# Patient Record
Sex: MALE | Race: WHITE | NOT HISPANIC OR LATINO | Employment: FULL TIME | URBAN - METROPOLITAN AREA
[De-identification: names, ages, dates, MRNs, and addresses within clinical notes are randomized per-mention and may not be internally consistent; named-entity substitution may affect disease eponyms.]

---

## 2017-03-02 ENCOUNTER — OFFICE VISIT (OUTPATIENT)
Dept: FAMILY MEDICINE | Facility: CLINIC | Age: 62
End: 2017-03-02

## 2017-03-02 VITALS
WEIGHT: 207 LBS | HEART RATE: 91 BPM | SYSTOLIC BLOOD PRESSURE: 136 MMHG | DIASTOLIC BLOOD PRESSURE: 88 MMHG | HEIGHT: 69 IN | TEMPERATURE: 98 F | BODY MASS INDEX: 30.66 KG/M2

## 2017-03-02 DIAGNOSIS — Z02.89 PHYSICAL EXAMINATION OF EMPLOYEE: Primary | ICD-10-CM

## 2017-03-02 PROCEDURE — 99203 OFFICE O/P NEW LOW 30 MIN: CPT | Mod: ,,, | Performed by: FAMILY MEDICINE

## 2017-03-02 RX ORDER — DULAGLUTIDE 0.75 MG/.5ML
INJECTION, SOLUTION SUBCUTANEOUS
COMMUNITY
Start: 2017-02-11 | End: 2017-11-03 | Stop reason: SDUPTHER

## 2017-03-02 RX ORDER — SITAGLIPTIN 100 MG/1
TABLET, FILM COATED ORAL
COMMUNITY
Start: 2017-02-26 | End: 2017-05-17 | Stop reason: SDUPTHER

## 2017-04-17 LAB
ALT SERPL-CCNC: 24 IU/L (ref 0–44)
AST SERPL-CCNC: 21 IU/L (ref 0–40)
BUN SERPL-MCNC: 11 MG/DL (ref 8–27)
BUN/CREAT SERPL: 10 (ref 10–24)
CALCIUM SERPL-MCNC: 10.2 MG/DL (ref 8.6–10.2)
CHLORIDE SERPL-SCNC: 95 MMOL/L (ref 96–106)
CHOLEST SERPL-MCNC: 141 MG/DL (ref 100–199)
CO2 SERPL-SCNC: 24 MMOL/L (ref 18–29)
CREAT SERPL-MCNC: 1.06 MG/DL (ref 0.76–1.27)
GLUCOSE SERPL-MCNC: 118 MG/DL (ref 65–99)
HBA1C MFR BLD: 6.5 % (ref 4.8–5.6)
HDLC SERPL-MCNC: 49 MG/DL
LDLC SERPL CALC-MCNC: 75 MG/DL (ref 0–99)
POTASSIUM SERPL-SCNC: 5.7 MMOL/L (ref 3.5–5.2)
SODIUM SERPL-SCNC: 134 MMOL/L (ref 134–144)
TRIGL SERPL-MCNC: 87 MG/DL (ref 0–149)
VLDLC SERPL CALC-MCNC: 17 MG/DL (ref 5–40)

## 2017-05-17 RX ORDER — SITAGLIPTIN 100 MG/1
TABLET, FILM COATED ORAL
Qty: 30 TABLET | Refills: 0 | Status: SHIPPED | OUTPATIENT
Start: 2017-05-17 | End: 2017-11-10

## 2017-08-11 RX ORDER — MULTIVITAMIN
1 TABLET ORAL DAILY
COMMUNITY
Start: 2013-12-09 | End: 2019-05-24

## 2017-08-18 ENCOUNTER — OFFICE VISIT (OUTPATIENT)
Dept: FAMILY MEDICINE | Facility: CLINIC | Age: 62
End: 2017-08-18
Payer: COMMERCIAL

## 2017-08-18 ENCOUNTER — TELEPHONE (OUTPATIENT)
Dept: FAMILY MEDICINE | Facility: CLINIC | Age: 62
End: 2017-08-18

## 2017-08-18 VITALS
HEART RATE: 84 BPM | BODY MASS INDEX: 30.36 KG/M2 | HEIGHT: 69 IN | DIASTOLIC BLOOD PRESSURE: 86 MMHG | SYSTOLIC BLOOD PRESSURE: 149 MMHG | WEIGHT: 205 LBS

## 2017-08-18 DIAGNOSIS — E87.5 HYPERKALEMIA: ICD-10-CM

## 2017-08-18 DIAGNOSIS — M79.604 RIGHT LEG PAIN: Primary | ICD-10-CM

## 2017-08-18 PROBLEM — E66.9 OBESITY WITH BODY MASS INDEX 30 OR GREATER: Status: ACTIVE | Noted: 2017-08-18

## 2017-08-18 PROBLEM — I10 BENIGN ESSENTIAL HYPERTENSION: Status: ACTIVE | Noted: 2017-08-18

## 2017-08-18 PROBLEM — Z78.9 NON-SMOKER: Status: ACTIVE | Noted: 2017-08-18

## 2017-08-18 PROBLEM — L40.8 OTHER PSORIASIS: Status: ACTIVE | Noted: 2017-08-18

## 2017-08-18 PROBLEM — E11.9 WELL CONTROLLED DIABETES MELLITUS: Status: ACTIVE | Noted: 2017-08-18

## 2017-08-18 PROBLEM — E78.2 MULTIPLE-TYPE HYPERLIPIDEMIA: Status: ACTIVE | Noted: 2017-08-18

## 2017-08-18 PROCEDURE — 99213 OFFICE O/P EST LOW 20 MIN: CPT | Mod: ,,, | Performed by: INTERNAL MEDICINE

## 2017-08-18 PROCEDURE — 3008F BODY MASS INDEX DOCD: CPT | Mod: ,,, | Performed by: INTERNAL MEDICINE

## 2017-08-18 NOTE — PATIENT INSTRUCTIONS
Muscle Strain in the Extremities  A muscle strain is a stretching and tearing of muscle fibers. This causes pain, especially when you move that muscle. There may also be some swelling and bruising.  Home care  · Keep the hurt area raised to reduce pain and swelling. This is especially important during the first 48 hours.  · Apply an ice pack over the injured area for 15 to 20 minutes every 3 to 6 hours. You should do this for the first 24 to 48 hours. You can make an ice pack by filling a plastic bag that seals at the top with ice cubes and then wrapping it with a thin towel. Be careful not to injure your skin with the ice treatments. Ice should never be applied directly to skin. Continue the use of ice packs for relief of pain and swelling as needed. After 48 hours, apply heat (warm shower or warm bath) for 15 to 20 minutes several times a day, or alternate ice and heat.  · You may use over-the-counter pain medicine to control pain, unless another medicine was prescribed. If you have chronic liver or kidney disease or ever had a stomach ulcer or GI bleeding, talk with your healthcare provider before using these medicines.  · For leg strains: If crutches have been recommended, dont put full weight on the hurt leg until you can do so without pain. You can return to sports when you are able to hop and run on the injured leg without pain.  Follow-up care  Follow up with your healthcare provider, or as advised.  When to seek medical advice  Call your healthcare provider right away if any of these occur:  · The toes of the injured leg become swollen, cold, blue, numb, or tingly  · Pain or swelling increases  Date Last Reviewed: 11/19/2015  © 1062-5561 [employer]. [address] [place] [address], [place] [address]. All rights reserved. This information is not intended as a substitute for professional medical care. Always follow your healthcare professional's instructions.

## 2017-08-18 NOTE — PROGRESS NOTES
Subjective:       Patient ID: Mg Torre is a 62 y.o. male.    Chief Complaint: Results (pot high )    Pt c/o leg pain rt side    Also K level is high      Leg Pain    The incident occurred more than 1 week ago. There was no injury mechanism. The pain is present in the right leg. The pain is at a severity of 5/10. The pain is moderate. The pain has been fluctuating since onset. Pertinent negatives include no numbness. The treatment provided no relief.       Past Medical History:   Diagnosis Date    Diabetes mellitus     Diabetes mellitus, type 2     Hyperlipidemia     Hypertension      Social History     Social History    Marital status:      Spouse name: N/A    Number of children: N/A    Years of education: N/A     Occupational History    Not on file.     Social History Main Topics    Smoking status: Never Smoker    Smokeless tobacco: Never Used    Alcohol use Yes    Drug use: No    Sexual activity: Yes     Partners: Female     Other Topics Concern    Not on file     Social History Narrative    No narrative on file     Past Surgical History:   Procedure Laterality Date    APPENDECTOMY      SPINE SURGERY       Family History   Problem Relation Age of Onset    Heart disease Father        Review of Systems   Constitutional: Positive for fatigue. Negative for activity change, appetite change and unexpected weight change.   HENT: Negative for congestion, sneezing and trouble swallowing.    Eyes: Negative for pain and visual disturbance.   Respiratory: Negative for cough, chest tightness and shortness of breath.    Cardiovascular: Negative for chest pain, palpitations and leg swelling.        Borderline hypertension.   Gastrointestinal: Negative for abdominal distention, abdominal pain, blood in stool, constipation and diarrhea.   Endocrine: Negative for cold intolerance, heat intolerance, polydipsia, polyphagia and polyuria.        Diabetes mellitus.   Genitourinary: Negative for  dysuria, hematuria and scrotal swelling.   Musculoskeletal: Positive for back pain. Negative for arthralgias and gait problem.        Patient is a new onset of right thigh pain laterally and medially.   Skin: Positive for rash. Negative for pallor and wound.        Extensive psoriasis on the skin especially in the trunk and back.   Allergic/Immunologic: Negative for environmental allergies, food allergies and immunocompromised state.   Neurological: Negative for dizziness, seizures, speech difficulty, light-headedness and numbness.   Hematological: Negative for adenopathy. Does not bruise/bleed easily.   Psychiatric/Behavioral: Negative for agitation, behavioral problems and confusion. The patient is not nervous/anxious.        Objective:      Physical Exam   Constitutional: He appears well-developed and well-nourished. No distress.   HENT:   Head: Normocephalic and atraumatic.   Eyes: Right eye exhibits no discharge. Left eye exhibits no discharge.   Neck: No tracheal deviation present. No thyromegaly present.   Cardiovascular: Normal rate, regular rhythm and normal heart sounds.    Pulmonary/Chest: Breath sounds normal.   Abdominal: Soft. Bowel sounds are normal. There is no rebound.   Musculoskeletal: He exhibits tenderness.   Patient has pain on the right hip region laterally. On extension of the hip joint he experiences pain laterally. He does experience pain medially in the thigh but on pressure there is no pain on the type medially.   Skin: Rash noted.   Extensive psoriasis noted on the skin in the trunk and back.       Assessment:       1. Right leg pain    2. Hyperkalemia         Plan:           Right leg pain    Hyperkalemia    Patient has dense advised to stop losartan. He should stop anti-inflammatory medications and Goody powders. Increase his fluid intake for the next few weeks. Stop bananas and potassium-rich food Check blood work metabolic panel next week.    I will referred to Dr. Ritter for new onset  of right hip and right lower extreme any pain.    Follow-up in approximately 4-6 weeks.

## 2017-08-18 NOTE — TELEPHONE ENCOUNTER
----- Message from Salazar Campbell MD sent at 8/17/2017  5:21 PM CDT -----   Patient's potassium level is elevated. Stop Losartan now and follow up soon to review    Is he taking lot of fruits and nuts.    Will  Need repeat labs,      ----- Message -----  From: Fallon Stevens  Sent: 8/14/2017  10:11 AM  To: Salazar Campbell MD    LAB [facility] (ELECTROLYTE PANEL) 8/12/17

## 2017-09-03 LAB
BUN SERPL-MCNC: 12 MG/DL (ref 8–27)
BUN/CREAT SERPL: 12 (ref 10–24)
CALCIUM SERPL-MCNC: 9.6 MG/DL (ref 8.6–10.2)
CHLORIDE SERPL-SCNC: 94 MMOL/L (ref 96–106)
CO2 SERPL-SCNC: 23 MMOL/L (ref 18–29)
CREAT SERPL-MCNC: 0.97 MG/DL (ref 0.76–1.27)
GLUCOSE SERPL-MCNC: 137 MG/DL (ref 65–99)
POTASSIUM SERPL-SCNC: 4.7 MMOL/L (ref 3.5–5.2)
SODIUM SERPL-SCNC: 137 MMOL/L (ref 134–144)

## 2017-09-15 ENCOUNTER — OFFICE VISIT (OUTPATIENT)
Dept: FAMILY MEDICINE | Facility: CLINIC | Age: 62
End: 2017-09-15
Payer: COMMERCIAL

## 2017-09-15 VITALS
WEIGHT: 207 LBS | HEIGHT: 69 IN | SYSTOLIC BLOOD PRESSURE: 195 MMHG | DIASTOLIC BLOOD PRESSURE: 105 MMHG | BODY MASS INDEX: 30.66 KG/M2 | HEART RATE: 76 BPM

## 2017-09-15 DIAGNOSIS — Z23 INFLUENZA VACCINE ADMINISTERED: Primary | ICD-10-CM

## 2017-09-15 DIAGNOSIS — E11.9 WELL CONTROLLED DIABETES MELLITUS: ICD-10-CM

## 2017-09-15 DIAGNOSIS — I10 BENIGN ESSENTIAL HYPERTENSION: ICD-10-CM

## 2017-09-15 DIAGNOSIS — E78.2 MULTIPLE-TYPE HYPERLIPIDEMIA: ICD-10-CM

## 2017-09-15 PROCEDURE — 90471 IMMUNIZATION ADMIN: CPT | Mod: ,,, | Performed by: INTERNAL MEDICINE

## 2017-09-15 PROCEDURE — 90686 IIV4 VACC NO PRSV 0.5 ML IM: CPT | Mod: ,,, | Performed by: INTERNAL MEDICINE

## 2017-09-15 PROCEDURE — 3008F BODY MASS INDEX DOCD: CPT | Mod: ,,, | Performed by: INTERNAL MEDICINE

## 2017-09-15 PROCEDURE — 99214 OFFICE O/P EST MOD 30 MIN: CPT | Mod: 25,,, | Performed by: INTERNAL MEDICINE

## 2017-09-15 RX ORDER — AMLODIPINE BESYLATE 5 MG/1
5 TABLET ORAL DAILY
Qty: 90 TABLET | Refills: 3 | Status: SHIPPED | OUTPATIENT
Start: 2017-09-15 | End: 2018-09-09 | Stop reason: SDUPTHER

## 2017-09-15 NOTE — PATIENT INSTRUCTIONS
Taking Your Blood Pressure  Blood pressure is the force of blood against the artery wall as it moves from the heart through the blood vessels. You can take your own blood pressure reading using a digital monitor. Take your readings the same each time, using the same arm. Take readings as often as your healthcare provider instructs.  About blood pressure monitors  Blood pressure monitors are designed for certain ages and cases. You can find monitors for older adults, for pregnant women, and for children. Make sure the one you choose is the right one for your age and situation.  The American Heart Association recommends an automatic cuff monitor that fits on your upper arm (bicep). The cuff should fit your arm size. A cuff thats too large or too small will not give an accurate reading. Measure around your upper arm to find your size.  Monitors that attach to your finger or wrist are not as accurate as monitors for your upper arm.  Ask your healthcare provider for help in choosing a monitor. Bring your monitor to your next provider visit if you need help in using it the correct way.  The steps below are general instructions for using an automatic digital monitor.  Step 1. Relax    · Take your blood pressure at the same time every day, such as in the morning or evening, or at the time your healthcare provider recommends.  · Wait at least a half-hour after smoking, eating, or exercising. Don't drink coffee, tea, soda, or other caffeinated beverages before checking your blood pressure.  · Sit comfortably at a table with both feet on the floor. Do not cross your legs or feet. Place the monitor near you.  · Rest for a few minutes before you begin.  Step 2. Wrap the cuff    · Place your arm on the table, palm up. Your arm should be at the level of your heart. Wrap the cuff around your upper arm, just above your elbow. Its best done on bare skin, not over clothing. Most cuffs will indicate where the brachial artery (the  blood vessel in the middle of the arm at the inner side of the elbow) should line up with the cuff. Look in your monitor's instruction booklet for an illustration. You can also bring your cuff to your healthcare provider and have them show you how to correctly place the cuff.  Step 3. Inflate the cuff    · Push the button that starts the pump.  · The cuff will tighten, then loosen.  · The numbers will change. When they stop changing, your blood pressure reading will appear.  · Take 2 or 3 readings one minute apart.  Step 4. Write down the results of each reading    · Write down your blood pressure numbers for each reading. Note the date and time. Keep your results in one place, such as a notebook. Even if your monitor has a built-in memory, keep a hard copy of the readings.  · Remove the cuff from your arm. Turn off the machine.  · Bring your blood pressure records with your healthcare providers at each visit.  · If you start a new blood pressure medicine, note the day you started the new medicine. Also note the day if you change the dose of your medicine. This information goes on your blood pressure recording sheet. This will help your healthcare provider monitor how well the medicine changes are working.  · Ask your healthcare provider what numbers should prompt you to call him or her. Also ask what numbers should prompt you to get help right away.  Date Last Reviewed: 11/1/2016  © 5385-2666 [employer]. [address] [place] [address], [place] [address]. All rights reserved. This information is not intended as a substitute for professional medical care. Always follow your healthcare professional's instructions.

## 2017-09-15 NOTE — PROGRESS NOTES
Subjective:       Patient ID: Mg Torre is a 62 y.o. male.    Chief Complaint: Diabetes (lab review ); Hyperlipidemia; and Hypertension    Pt c/o leg pain rt side-patient has pain on the right foot.    Also K level is high      Leg Pain    The incident occurred more than 1 week ago. There was no injury mechanism. The pain is present in the right leg. The pain is at a severity of 5/10. The pain is moderate. The pain has been fluctuating since onset. Pertinent negatives include no numbness. The treatment provided no relief.   Diabetes   He presents for his follow-up diabetic visit. He has type 2 diabetes mellitus. No MedicAlert identification noted. His disease course has been stable. Pertinent negatives for hypoglycemia include no confusion, dizziness, nervousness/anxiousness, pallor, seizures or speech difficulty. Associated symptoms include fatigue. Pertinent negatives for diabetes include no chest pain, no foot ulcerations, no polydipsia, no polyphagia, no polyuria and no weight loss. Pertinent negatives for diabetic complications include no CVA. Current diabetic treatment includes oral agent (triple therapy) (Injection Trulicity). He is compliant with treatment most of the time. His weight is stable. He is following a diabetic diet. There is no change in his home blood glucose trend. His breakfast blood glucose range is generally 130-140 mg/dl. An ACE inhibitor/angiotensin II receptor blocker is contraindicated (Hyperkalemia). He does not see a podiatrist.Eye exam is current.   Hyperlipidemia   This is a chronic problem. Associated symptoms include leg pain. Pertinent negatives include no chest pain or shortness of breath. Current antihyperlipidemic treatment includes statins. Risk factors for coronary artery disease include diabetes mellitus, dyslipidemia, male sex, hypertension, a sedentary lifestyle and obesity.   Hypertension   This is a chronic problem. The current episode started more than 1 year  ago. The problem is uncontrolled. Pertinent negatives include no anxiety, chest pain, palpitations, peripheral edema or shortness of breath. There are no associated agents to hypertension. Risk factors for coronary artery disease include diabetes mellitus, dyslipidemia, male gender, obesity and sedentary lifestyle. Past treatments include nothing (Losartan stopped because of hyperkalemia). Compliance problems include medication side effects.  There is no history of angina or CVA.       Past Medical History:   Diagnosis Date    Diabetes mellitus     Diabetes mellitus, type 2     Hyperlipidemia     Hypertension      Social History     Social History    Marital status:      Spouse name: N/A    Number of children: N/A    Years of education: N/A     Occupational History    Not on file.     Social History Main Topics    Smoking status: Never Smoker    Smokeless tobacco: Never Used    Alcohol use Yes    Drug use: No    Sexual activity: Yes     Partners: Female     Other Topics Concern    Not on file     Social History Narrative    No narrative on file     Past Surgical History:   Procedure Laterality Date    APPENDECTOMY      SPINE SURGERY       Family History   Problem Relation Age of Onset    Heart disease Father        Review of Systems   Constitutional: Positive for fatigue. Negative for activity change, appetite change, unexpected weight change and weight loss.   HENT: Negative for congestion, sneezing and trouble swallowing.    Eyes: Negative for pain and visual disturbance.   Respiratory: Negative for cough, chest tightness and shortness of breath.    Cardiovascular: Negative for chest pain, palpitations and leg swelling.        Borderline hypertension.   Gastrointestinal: Negative for abdominal distention, abdominal pain, blood in stool, constipation and diarrhea.   Endocrine: Negative for cold intolerance, heat intolerance, polydipsia, polyphagia and polyuria.        Diabetes mellitus.  "  Genitourinary: Negative for dysuria, hematuria and scrotal swelling.   Musculoskeletal: Positive for back pain. Negative for arthralgias and gait problem.        Patient is a new onset of right thigh pain laterally and medially.   Skin: Negative for pallor, rash and wound.        Extensive psoriasis on the skin especially in the trunk and back.   Allergic/Immunologic: Negative for environmental allergies, food allergies and immunocompromised state.   Neurological: Negative for dizziness, seizures, speech difficulty, light-headedness and numbness.   Hematological: Negative for adenopathy. Does not bruise/bleed easily.   Psychiatric/Behavioral: Negative for agitation, behavioral problems and confusion. The patient is not nervous/anxious.        Objective:       Vitals:    09/15/17 0949   BP: (!) 195/105   Pulse: 76   Weight: 93.9 kg (207 lb)   Height: 5' 9" (1.753 m)     Physical Exam   Constitutional: He appears well-developed and well-nourished. No distress.   HENT:   Head: Normocephalic and atraumatic.   Eyes: Right eye exhibits no discharge. Left eye exhibits no discharge.   Neck: No tracheal deviation present. No thyromegaly present.   Cardiovascular: Normal rate, regular rhythm and normal heart sounds.    Pulmonary/Chest: Breath sounds normal.   Abdominal: Soft. Bowel sounds are normal. There is no rebound.   Musculoskeletal: He exhibits tenderness.   Discomfort on the right thigh.   Lymphadenopathy:     He has no cervical adenopathy.   Skin: Skin is dry. Rash (psoriasis) noted.   Extensive psoriasis noted on the skin in the trunk and back.   Psychiatric: He has a normal mood and affect.       Assessment:       1. Influenza vaccine administered    2. Benign essential hypertension    3. Multiple-type hyperlipidemia    4. Well controlled diabetes mellitus         Plan:           Influenza vaccine administered  -     Influenza - Quadrivalent (3 years & older) (PF)    Benign essential hypertension  -     " amlodipine (NORVASC) 5 MG tablet; Take 1 tablet (5 mg total) by mouth once daily.  Dispense: 90 tablet; Refill: 3    Multiple-type hyperlipidemia    Well controlled diabetes mellitus    Patient's repeat potassium is normal. He is of losartan because of prior hyperkalemia. I will start him on amlodipine 5 mg per day. He will continue to monitor his blood pressures.    Patient will continue with his diabetes management. I'll follow him shortly to review blood pressures again.    Advised Mr. Torre to monitor Blood sugars at home and record them.  Exercise, watch diet and loose weight.  keep a close eye on feet and keep them clean. Annual eye examination. Annual influenza vaccine.  Monitor HgbA1c every 3 to 6 months. Monitor urine microalbumin every year.keep LDL less than 100. Monitor blood pressure and target blood pressure 120/70.          Patient has been advised to watch diet and exercise. Avoid fried and fatty food. Compliance to medications and follow up urged.

## 2017-09-20 ENCOUNTER — OFFICE VISIT (OUTPATIENT)
Dept: FAMILY MEDICINE | Facility: CLINIC | Age: 62
End: 2017-09-20
Payer: COMMERCIAL

## 2017-09-20 VITALS
SYSTOLIC BLOOD PRESSURE: 139 MMHG | BODY MASS INDEX: 30.36 KG/M2 | HEIGHT: 69 IN | RESPIRATION RATE: 16 BRPM | WEIGHT: 205 LBS | HEART RATE: 89 BPM | DIASTOLIC BLOOD PRESSURE: 80 MMHG

## 2017-09-20 DIAGNOSIS — M25.551 PAIN OF RIGHT HIP JOINT: ICD-10-CM

## 2017-09-20 DIAGNOSIS — I10 BENIGN ESSENTIAL HYPERTENSION: ICD-10-CM

## 2017-09-20 DIAGNOSIS — E11.9 WELL CONTROLLED DIABETES MELLITUS: ICD-10-CM

## 2017-09-20 DIAGNOSIS — E78.2 MULTIPLE-TYPE HYPERLIPIDEMIA: ICD-10-CM

## 2017-09-20 DIAGNOSIS — Z01.818 PRE-OP EXAMINATION: Primary | ICD-10-CM

## 2017-09-20 DIAGNOSIS — I51.7 CARDIOMEGALY: ICD-10-CM

## 2017-09-20 PROCEDURE — 99214 OFFICE O/P EST MOD 30 MIN: CPT | Mod: ,,, | Performed by: INTERNAL MEDICINE

## 2017-09-20 PROCEDURE — 3008F BODY MASS INDEX DOCD: CPT | Mod: ,,, | Performed by: INTERNAL MEDICINE

## 2017-09-20 NOTE — PATIENT INSTRUCTIONS
Using a Blood Sugar Log    You have diabetes. This means your body has trouble regulating a sugar called glucose. To help manage your diabetes, youll need to check your blood sugar level as directed by your healthcare provider. Keeping a log of your blood sugar levels will help you track your blood sugar readings. Its a simple and easy way to see how well you are controlling your diabetes.  Checking your blood sugar level  You can check your blood sugar level with a blood glucose meter. Youll first prick the side of your finger with a tiny lancet to draw a tiny drop of blood onto the test strip. Some glucose meters let you use another place on your body to test. But these other places should not be used in some cases as they may be inaccurate. Follow the instructions for your glucose meter. And talk with your healthcare provider before doing the test on other places.  The strip goes into the meter first, then a drop of blood is placed on the tip of the strip. The meter then shows a reading that tells you the level of your blood sugar. Your readings should be in your target range as often as possible. This means not too high or too low. Staying in this range helps lower your risk for complications. Your healthcare provider will help you figure out the target range that is best for you.  Tracking your readings  Every time you check your blood sugar, use your log to keep track of your readings. Your meter will also probably have a memory feature that your healthcare provider can check at your next visit. You may be advised by your healthcare provider to check your blood sugar in the morning, at bedtime, and before and after meals. Be sure to write down all of your numbers. Also use your log to record things that might have affected your blood sugar. Some examples include being sick, certain medicines, being physically active, feeling stressed, or skipping meals.   Lessons learned from your readings  Tracking your  blood sugar readings helps you see patterns. These patterns tell you how your actions affect your blood sugar. For instance, you may have higher numbers after eating certain foods or lower numbers after exercise. They just help you understand how to stay in your target range more often, so that your diabetes remains in good control.  Sharing your log with your healthcare team  Bring your blood sugar log and glucose meter with you to all of your healthcare appointments. This can help your healthcare team make changes to your treatment plan, if needed. This may involve making changes in what you eat, what medicines you take, or how much you exercise.  To learn more  The resources below can help you learn more:  · American Diabetes Association 735-213-7755 www.diabetes.org  · [employer] 772-052-4848 www.lighthouse.org  · [employer] [place] 837-980-7458 www.nei.nih.gov  · [employer] 176-015-5454 www.hormone.org  Date Last Reviewed: 5/1/2016  © 2754-0573 [employer]. [address] [place] [address], [place] [address]. All rights reserved. This information is not intended as a substitute for professional medical care. Always follow your healthcare professional's instructions.

## 2017-09-20 NOTE — PROGRESS NOTES
Subjective:       Patient ID: Mg Torre is a 62 y.o. male.    Chief Complaint: Pre-op Exam (surg clear for hip )    The procedure scheduled is a Rt Hip replacement on Oct 3rd or oct 10th 2017. The surgeon for the procedure will be Dr Ritter. The chief complaint is Rt hip pain. Recent symptoms do not include fever, chills, fatigue, chest pain, cough, dyspnea, dysuria, urinary frequency, nausea, vomiting, diarrhea, abdominal pain, easy bruising, lower extremity swelling or poor exercise tolerance. The patient's last health maintenance visit was week(s) ago. Pertinent medical history includes prior anesthesia, previous anesthesia reaction (Left shoulder surgery- woke up late after anesthesia) and diabetes, while pertinent medical history does not include pulmonary disease, renal disease, gastrointestinal disease (Occ heart burn), sleep apnea, thromboembolic problems, clotting disorder, bleeding disorder or corticosteroid use in the last six months. Pertinent family history includes myocardial infarction (Father HAd MI - not surgery or anesthesia related.), while pertinent family history does not include anesthesia reaction, stroke, aneurysm, clotting disorder or bleeding disorder. Pertinent social history does not include nonsteroidal anti-inflammatory drug use, tobacco use, alcohol use, illicit drug use, transfusion refusal, wearing dentures or partial plates or concerns regarding care after surgery. After surgery the patient plans to recover at home with family.    Hypertension   This is a chronic problem. The current episode started more than 1 year ago. The problem is controlled. Associated symptoms include anxiety. Pertinent negatives include no blurred vision, chest pain, palpitations, peripheral edema or shortness of breath. Past treatments include calcium channel blockers, diuretics and angiotensin blockers. The current treatment provides moderate improvement. Compliance problems include psychosocial  issues and exercise.  Hypertensive end-organ damage includes heart failure. There is no history of kidney disease, CVA, PVD or renovascular disease.   Hyperlipidemia   This is a chronic problem. The current episode started more than 1 year ago. Pertinent negatives include no chest pain or shortness of breath. Current antihyperlipidemic treatment includes statins.   Diabetes   He presents for his follow-up diabetic visit. He has type 2 diabetes mellitus. No MedicAlert identification noted. Pertinent negatives for hypoglycemia include no confusion, dizziness, nervousness/anxiousness, pallor, seizures or speech difficulty. Associated symptoms include fatigue. Pertinent negatives for diabetes include no blurred vision, no chest pain, no foot ulcerations, no polydipsia, no polyphagia, no polyuria and no visual change. Pertinent negatives for diabetic complications include no CVA or PVD. Risk factors for coronary artery disease include obesity, male sex, hypertension, diabetes mellitus, dyslipidemia and sedentary lifestyle. Current diabetic treatment includes oral agent (triple therapy). He is compliant with treatment most of the time. His weight is stable. An ACE inhibitor/angiotensin II receptor blocker is being taken. He does not see a podiatrist.Eye exam is current.       Past Medical History:   Diagnosis Date    Diabetes mellitus     Diabetes mellitus, type 2     Hyperlipidemia     Hypertension      Social History     Social History    Marital status:      Spouse name: N/A    Number of children: N/A    Years of education: N/A     Occupational History    Not on file.     Social History Main Topics    Smoking status: Never Smoker    Smokeless tobacco: Never Used    Alcohol use Yes    Drug use: No    Sexual activity: Yes     Partners: Female     Other Topics Concern    Not on file     Social History Narrative    No narrative on file     Past Surgical History:   Procedure Laterality Date     "APPENDECTOMY      SPINE SURGERY       Family History   Problem Relation Age of Onset    Heart disease Father        Review of Systems   Constitutional: Positive for fatigue. Negative for activity change, appetite change and unexpected weight change.   HENT: Negative for congestion, sneezing and trouble swallowing.    Eyes: Negative for blurred vision, pain and visual disturbance.   Respiratory: Negative for cough, chest tightness and shortness of breath.    Cardiovascular: Negative for chest pain, palpitations and leg swelling.        Borderline hypertension.   Gastrointestinal: Negative for abdominal distention, abdominal pain, blood in stool, constipation and diarrhea.   Endocrine: Negative for cold intolerance, heat intolerance, polydipsia, polyphagia and polyuria.        Diabetes mellitus.   Genitourinary: Negative for dysuria, hematuria and scrotal swelling.   Musculoskeletal: Positive for back pain. Negative for arthralgias and gait problem.        Patient is a new onset of right thigh pain laterally and medially.   Skin: Negative for pallor, rash and wound.        Extensive psoriasis on the skin especially in the trunk and back.   Allergic/Immunologic: Negative for environmental allergies, food allergies and immunocompromised state.   Neurological: Negative for dizziness, seizures, speech difficulty, light-headedness and numbness.   Hematological: Negative for adenopathy. Does not bruise/bleed easily.   Psychiatric/Behavioral: Negative for agitation, behavioral problems and confusion. The patient is not nervous/anxious.        Objective:       Vitals:    09/20/17 0950 09/20/17 1039   BP: (!) 153/84 139/80   Pulse: 89    Resp: 16    Weight: 93 kg (205 lb)    Height: 5' 9" (1.753 m)      Physical Exam   Constitutional: He appears well-developed and well-nourished. No distress.   HENT:   Head: Normocephalic and atraumatic.   Eyes: Right eye exhibits no discharge. Left eye exhibits no discharge.   Neck: No " tracheal deviation present. No thyromegaly present.   Cardiovascular: Normal rate, regular rhythm and normal heart sounds.    Pulmonary/Chest: Breath sounds normal.   Abdominal: Soft. Bowel sounds are normal. There is no rebound.   Musculoskeletal: He exhibits tenderness.   Discomfort on the right thigh.   Lymphadenopathy:     He has no cervical adenopathy.   Skin: Skin is dry. Rash (psoriasis) noted.   Extensive psoriasis noted on the skin in the trunk and back.   Psychiatric: He has a normal mood and affect.       Assessment:       1. Pre-op examination    2. Benign essential hypertension    3. Multiple-type hyperlipidemia    4. Well controlled diabetes mellitus    5. Pain of right hip joint       CLINICAL HISTORY:  62 years (1955) Male I51.7    TECHNIQUE:  PA and lateral radiograph of the chest.    COMPARISON:  None available.    FINDINGS:  The lungs appear clear. There is no pneumothorax. Costophrenic angles are seen  without effusion. The heart is normal in size .  The mediastinum is within  normal limits. Osseous structures show degenerative changes in the spine. The  upper abdomen is unremarkable.    IMPRESSION:  No acute cardiac or pulmonary process.  Plan:           Pre-op examination    Benign essential hypertension    Multiple-type hyperlipidemia    Well controlled diabetes mellitus    Pain of right hip joint    Patient is here for preop clearance for proposed right hip replacement. He has chronic right-sided hip pain. This will be done under general anesthesia. Patient's chest x-rays unremarkable. Pending normal labs and unremarkable EKG he should be medically clear. He'll continue with has blood pressure medications on the day of surgery. He will hold his diabetic medications on the day of surgery.    He will need perioperative or postoperative insulin sliding scale to monitor his blood sugars.    Once he starts feeding effectively, without any nausea or vomiting- PO mouth medications can be  resumed.    Postoperative DVT prophylaxis may be considered as appropriate. Patient is nonsmoker and does not have any chronic pulmonary issues.    He'll keep his regular follow-up as scheduled with me for his chronic medical issues.  Current Outpatient Prescriptions on File Prior to Visit   Medication Sig Dispense Refill    amlodipine (NORVASC) 5 MG tablet Take 1 tablet (5 mg total) by mouth once daily. 90 tablet 3    aspirin 81 MG Chew Take 81 mg by mouth once daily.      glyBURIDE (DIABETA) 2.5 MG tablet       hydrochlorothiazide (HYDRODIURIL) 12.5 MG Tab       JANUVIA 100 mg Tab TAKE ONE TABLET BY MOUTH ONCE DAILY 30 tablet 0    metformin (GLUCOPHAGE) 500 MG tablet Take 500 mg by mouth 2 (two) times daily with meals.        multivitamin (THERAGRAN) per tablet Take 1 tablet by mouth Daily.      simvastatin (ZOCOR) 40 MG tablet       TRULICITY 0.75 mg/0.5 mL PnIj        No current facility-administered medications on file prior to visit.      Pt EKG , Chest X ray and new labs have been reviewed and he is medically optimal for proposed surgery. He will need post operative insulin sliding scale till he starts feeding adequately.

## 2017-09-23 LAB
ALBUMIN SERPL-MCNC: 4.5 G/DL (ref 3.6–4.8)
ALBUMIN/GLOB SERPL: 1.6 {RATIO} (ref 1.2–2.2)
ALP SERPL-CCNC: 74 IU/L (ref 39–117)
ALT SERPL-CCNC: 24 IU/L (ref 0–44)
APPEARANCE UR: CLEAR
AST SERPL-CCNC: 20 IU/L (ref 0–40)
BASOPHILS # BLD AUTO: 0 X10E3/UL (ref 0–0.2)
BASOPHILS NFR BLD AUTO: 0 %
BILIRUB SERPL-MCNC: 0.7 MG/DL (ref 0–1.2)
BILIRUB UR QL STRIP: NEGATIVE
BUN SERPL-MCNC: 16 MG/DL (ref 8–27)
BUN/CREAT SERPL: 15 (ref 10–24)
CALCIUM SERPL-MCNC: 10.1 MG/DL (ref 8.6–10.2)
CHLORIDE SERPL-SCNC: 95 MMOL/L (ref 96–106)
CO2 SERPL-SCNC: 28 MMOL/L (ref 18–29)
COLOR UR: YELLOW
CREAT SERPL-MCNC: 1.1 MG/DL (ref 0.76–1.27)
EOSINOPHIL # BLD AUTO: 0 X10E3/UL (ref 0–0.4)
EOSINOPHIL NFR BLD AUTO: 0 %
ERYTHROCYTE [DISTWIDTH] IN BLOOD BY AUTOMATED COUNT: 13.6 % (ref 12.3–15.4)
GLOBULIN SER CALC-MCNC: 2.9 G/DL (ref 1.5–4.5)
GLUCOSE SERPL-MCNC: 160 MG/DL (ref 65–99)
GLUCOSE UR QL: NEGATIVE
HCT VFR BLD AUTO: 44.4 % (ref 37.5–51)
HGB BLD-MCNC: 14.8 G/DL (ref 12.6–17.7)
HGB UR QL STRIP: NEGATIVE
IMM GRANULOCYTES # BLD: 0 X10E3/UL (ref 0–0.1)
IMM GRANULOCYTES NFR BLD: 0 %
KETONES UR QL STRIP: NEGATIVE
LEUKOCYTE ESTERASE UR QL STRIP: NEGATIVE
LYMPHOCYTES # BLD AUTO: 0.8 X10E3/UL (ref 0.7–3.1)
LYMPHOCYTES NFR BLD AUTO: 12 %
MCH RBC QN AUTO: 27.4 PG (ref 26.6–33)
MCHC RBC AUTO-ENTMCNC: 33.3 G/DL (ref 31.5–35.7)
MCV RBC AUTO: 82 FL (ref 79–97)
MICRO URNS: NORMAL
MONOCYTES # BLD AUTO: 0.7 X10E3/UL (ref 0.1–0.9)
MONOCYTES NFR BLD AUTO: 10 %
NEUTROPHILS # BLD AUTO: 5.5 X10E3/UL (ref 1.4–7)
NEUTROPHILS NFR BLD AUTO: 78 %
NITRITE UR QL STRIP: NEGATIVE
PH UR STRIP: 7 [PH] (ref 5–7.5)
PLATELET # BLD AUTO: 270 X10E3/UL (ref 150–379)
POTASSIUM SERPL-SCNC: 4.9 MMOL/L (ref 3.5–5.2)
PROT SERPL-MCNC: 7.4 G/DL (ref 6–8.5)
PROT UR QL STRIP: NEGATIVE
RBC # BLD AUTO: 5.41 X10E6/UL (ref 4.14–5.8)
SODIUM SERPL-SCNC: 138 MMOL/L (ref 134–144)
SP GR UR: 1.02 (ref 1–1.03)
UROBILINOGEN UR STRIP-MCNC: 1 MG/DL (ref 0.2–1)
WBC # BLD AUTO: 7.1 X10E3/UL (ref 3.4–10.8)

## 2017-10-06 RX ORDER — SIMVASTATIN 40 MG/1
TABLET, FILM COATED ORAL
Qty: 90 TABLET | Refills: 3 | Status: SHIPPED | OUTPATIENT
Start: 2017-10-06 | End: 2018-10-06 | Stop reason: SDUPTHER

## 2017-10-08 PROBLEM — Z92.89 HISTORY OF EKG: Status: ACTIVE | Noted: 2017-09-20

## 2017-11-02 ENCOUNTER — TELEPHONE (OUTPATIENT)
Dept: FAMILY MEDICINE | Facility: CLINIC | Age: 62
End: 2017-11-02

## 2017-11-02 DIAGNOSIS — E11.9 WELL CONTROLLED DIABETES MELLITUS: ICD-10-CM

## 2017-11-02 DIAGNOSIS — Z11.59 NEED FOR HEPATITIS C SCREENING TEST: ICD-10-CM

## 2017-11-02 DIAGNOSIS — I10 BENIGN ESSENTIAL HYPERTENSION: Primary | ICD-10-CM

## 2017-11-04 RX ORDER — DULAGLUTIDE 0.75 MG/.5ML
INJECTION, SOLUTION SUBCUTANEOUS
Qty: 12 SYRINGE | Refills: 3 | Status: SHIPPED | OUTPATIENT
Start: 2017-11-04 | End: 2017-11-06 | Stop reason: SDUPTHER

## 2017-11-05 LAB
ALBUMIN/CREAT UR: <3.6 MG/G CREAT (ref 0–30)
ALT SERPL-CCNC: 20 IU/L (ref 0–44)
BUN SERPL-MCNC: 15 MG/DL (ref 8–27)
BUN/CREAT SERPL: 15 (ref 10–24)
CALCIUM SERPL-MCNC: 10.1 MG/DL (ref 8.6–10.2)
CHLORIDE SERPL-SCNC: 97 MMOL/L (ref 96–106)
CO2 SERPL-SCNC: 23 MMOL/L (ref 18–29)
CREAT SERPL-MCNC: 1.01 MG/DL (ref 0.76–1.27)
CREAT UR-MCNC: 83.1 MG/DL
GFR SERPLBLD CREATININE-BSD FMLA CKD-EPI: 79 ML/MIN/1.73
GFR SERPLBLD CREATININE-BSD FMLA CKD-EPI: 92 ML/MIN/1.73
GLUCOSE SERPL-MCNC: 157 MG/DL (ref 65–99)
HBA1C MFR BLD: 6.1 % (ref 4.8–5.6)
HCV AB S/CO SERPL IA: <0.1 S/CO RATIO (ref 0–0.9)
MICROALBUMIN UR-MCNC: <3 UG/ML
POTASSIUM SERPL-SCNC: 5.3 MMOL/L (ref 3.5–5.2)
SODIUM SERPL-SCNC: 140 MMOL/L (ref 134–144)

## 2017-11-10 ENCOUNTER — OFFICE VISIT (OUTPATIENT)
Dept: FAMILY MEDICINE | Facility: CLINIC | Age: 62
End: 2017-11-10
Payer: COMMERCIAL

## 2017-11-10 VITALS
HEART RATE: 88 BPM | BODY MASS INDEX: 29.92 KG/M2 | SYSTOLIC BLOOD PRESSURE: 134 MMHG | DIASTOLIC BLOOD PRESSURE: 86 MMHG | HEIGHT: 69 IN | WEIGHT: 202 LBS

## 2017-11-10 DIAGNOSIS — E11.9 WELL CONTROLLED DIABETES MELLITUS: Primary | ICD-10-CM

## 2017-11-10 DIAGNOSIS — I10 BENIGN ESSENTIAL HYPERTENSION: Primary | ICD-10-CM

## 2017-11-10 PROCEDURE — 99213 OFFICE O/P EST LOW 20 MIN: CPT | Mod: ,,, | Performed by: INTERNAL MEDICINE

## 2017-11-10 RX ORDER — OXYCODONE AND ACETAMINOPHEN 5; 325 MG/1; MG/1
1 TABLET ORAL EVERY 4 HOURS PRN
COMMUNITY
End: 2018-01-12

## 2017-11-10 RX ORDER — ASPIRIN 325 MG
325 TABLET ORAL 2 TIMES DAILY
COMMUNITY
End: 2019-03-15

## 2017-11-10 NOTE — PATIENT INSTRUCTIONS
Taking Your Blood Pressure  Blood pressure is the force of blood against the artery wall as it moves from the heart through the blood vessels. You can take your own blood pressure reading using a digital monitor. Take your readings the same each time, using the same arm. Take readings as often as your healthcare provider instructs.  About blood pressure monitors  Blood pressure monitors are designed for certain ages and cases. You can find monitors for older adults, for pregnant women, and for children. Make sure the one you choose is the right one for your age and situation.  The American Heart Association recommends an automatic cuff monitor that fits on your upper arm (bicep). The cuff should fit your arm size. A cuff thats too large or too small will not give an accurate reading. Measure around your upper arm to find your size.  Monitors that attach to your finger or wrist are not as accurate as monitors for your upper arm.  Ask your healthcare provider for help in choosing a monitor. Bring your monitor to your next provider visit if you need help in using it the correct way.  The steps below are general instructions for using an automatic digital monitor.  Step 1. Relax    · Take your blood pressure at the same time every day, such as in the morning or evening, or at the time your healthcare provider recommends.  · Wait at least a half-hour after smoking, eating, or exercising. Don't drink coffee, tea, soda, or other caffeinated beverages before checking your blood pressure.  · Sit comfortably at a table with both feet on the floor. Do not cross your legs or feet. Place the monitor near you.  · Rest for a few minutes before you begin.  Step 2. Wrap the cuff    · Place your arm on the table, palm up. Your arm should be at the level of your heart. Wrap the cuff around your upper arm, just above your elbow. Its best done on bare skin, not over clothing. Most cuffs will indicate where the brachial artery (the  blood vessel in the middle of the arm at the inner side of the elbow) should line up with the cuff. Look in your monitor's instruction booklet for an illustration. You can also bring your cuff to your healthcare provider and have them show you how to correctly place the cuff.  Step 3. Inflate the cuff    · Push the button that starts the pump.  · The cuff will tighten, then loosen.  · The numbers will change. When they stop changing, your blood pressure reading will appear.  · Take 2 or 3 readings one minute apart.  Step 4. Write down the results of each reading    · Write down your blood pressure numbers for each reading. Note the date and time. Keep your results in one place, such as a notebook. Even if your monitor has a built-in memory, keep a hard copy of the readings.  · Remove the cuff from your arm. Turn off the machine.  · Bring your blood pressure records with your healthcare providers at each visit.  · If you start a new blood pressure medicine, note the day you started the new medicine. Also note the day if you change the dose of your medicine. This information goes on your blood pressure recording sheet. This will help your healthcare provider monitor how well the medicine changes are working.  · Ask your healthcare provider what numbers should prompt you to call him or her. Also ask what numbers should prompt you to get help right away.  Date Last Reviewed: 11/1/2016  © 2538-2103 [employer]. [address] [place] [address], [place] [address]. All rights reserved. This information is not intended as a substitute for professional medical care. Always follow your healthcare professional's instructions.

## 2017-11-10 NOTE — PROGRESS NOTES
Subjective:       Patient ID: Mg Torre is a 62 y.o. male.    Chief Complaint: Hypertension (lab review )    Hypertension   This is a chronic problem. The current episode started more than 1 year ago. The problem has been waxing and waning since onset. Pertinent negatives include no chest pain, palpitations, peripheral edema or shortness of breath. Risk factors for coronary artery disease include male gender, sedentary lifestyle, dyslipidemia and diabetes mellitus. Past treatments include diuretics and calcium channel blockers.       Past Medical History:   Diagnosis Date    Diabetes mellitus     Diabetes mellitus, type 2     Hyperlipidemia     Hypertension      Social History     Social History    Marital status:      Spouse name: N/A    Number of children: N/A    Years of education: N/A     Occupational History    Not on file.     Social History Main Topics    Smoking status: Never Smoker    Smokeless tobacco: Never Used    Alcohol use Yes    Drug use: No    Sexual activity: Yes     Partners: Female     Other Topics Concern    Not on file     Social History Narrative    No narrative on file     Past Surgical History:   Procedure Laterality Date    APPENDECTOMY      SPINE SURGERY       Family History   Problem Relation Age of Onset    Heart disease Father        Review of Systems   Constitutional: Positive for fatigue. Negative for activity change, appetite change and unexpected weight change.   HENT: Negative for congestion, sneezing and trouble swallowing.    Eyes: Negative for pain and visual disturbance.   Respiratory: Negative for cough, chest tightness and shortness of breath.    Cardiovascular: Negative for chest pain, palpitations and leg swelling.        Borderline hypertension.   Gastrointestinal: Negative for abdominal distention and abdominal pain.   Endocrine: Negative for cold intolerance and heat intolerance.        Diabetes mellitus.   Genitourinary: Negative for  "dysuria.   Musculoskeletal: Negative for arthralgias, back pain and gait problem.        Patient is status post right-sided hip arthroplasty and is recovering gradually with acceptable range of motion and quality of life.   Skin: Negative for pallor, rash and wound.        Extensive psoriasis on the skin especially in the trunk and back.   Neurological: Negative for dizziness.   Psychiatric/Behavioral: Negative for agitation, behavioral problems and confusion. The patient is not nervous/anxious.        Objective:       Vitals:    11/10/17 0933 11/10/17 1021   BP: (!) 156/84 134/86   BP Location:  Right arm   Patient Position:  Sitting   BP Method:  Large (Automatic)   Pulse: 88    Weight: 91.6 kg (202 lb)    Height: 5' 9" (1.753 m)      Physical Exam   Constitutional: He appears well-developed and well-nourished. No distress.   HENT:   Head: Normocephalic and atraumatic.   Eyes: Right eye exhibits no discharge. Left eye exhibits no discharge.   Neck: No tracheal deviation present. No thyromegaly present.   Cardiovascular: Normal rate, regular rhythm and normal heart sounds.    Pulmonary/Chest: Breath sounds normal.   Abdominal: Soft. Bowel sounds are normal. There is no rebound.   Musculoskeletal:   Discomfort on the right thigh.   Lymphadenopathy:     He has no cervical adenopathy.   Skin: Skin is dry. Rash (psoriasis) noted.   Extensive psoriasis noted on the skin in the trunk and back.   Psychiatric: He has a normal mood and affect.     Basic metabolic panel   Order: [id number]   Status:  Final result   Visible to patient:  No (Not Released) Next appt:  01/12/2018 at 09:00 AM in Family Medicine (Salazar Campbell MD) Dx:  Benign essential hypertension   Notes Recorded by Salazar Campbell MD on 11/5/2017 at 7:39 PM CST  K is 5.3  Pt not on K supplements. To be advised to stop Banana OJ repeat     Ref Range & Units 6d ago 1mo ago    Glucose 65 - 99 mg/dL 157   160      BUN, Bld 8 - 27 mg/dL 15  16     Creatinine 0.76 - " 1.27 mg/dL 1.01  1.10     eGFR if non African American >59 mL/min/1.73 79  72     eGFR if African American >59 mL/min/1.73 92  83     BUN/Creatinine Ratio 10 - 24 15  15     Sodium 134 - 144 mmol/L 140  138     Potassium 3.5 - 5.2 mmol/L 5.3   4.9               Notes Recorded by Salazar Campbell MD on 11/5/2017 at 7:39 PM CST  K is 5.3  Pt not on K supplements. To be advised to stop Banana OJ repeat    Ref Range & Units 6d ago   Hep C Virus Ab Signal/Cutoff 0.0 - 0.9 s/co ratio <0.1    Comments:                                   Negative:     < 0.8                                Indeterminate: 0.8 - 0.9                                     Positive:     > 0.9            Notes Recorded by Salazar Campbell MD on 11/5/2017 at 7:39 PM CST  K is 5.3  Pt not on K supplements. To be advised to stop Banana OJ repeat     Ref Range & Units 6d ago 6mo ago    Hemoglobin A1C 4.8 - 5.6 % 6.1   6.5CM     Comments:          Pre-diabetes: 5.7 - 6.4              Assessment:       1. Benign essential hypertension         Plan:           Benign essential hypertension      Current Outpatient Prescriptions on File Prior to Visit   Medication Sig Dispense Refill    amlodipine (NORVASC) 5 MG tablet Take 1 tablet (5 mg total) by mouth once daily. 90 tablet 3    dulaglutide (TRULICITY) 0.75 mg/0.5 mL PnIj Inject 0.5 mLs (0.75 mg total) into the skin once a week. 12 Syringe 3    glyBURIDE (DIABETA) 2.5 MG tablet       hydrochlorothiazide (HYDRODIURIL) 12.5 MG Tab       metformin (GLUCOPHAGE) 500 MG tablet Take 500 mg by mouth 2 (two) times daily with meals.        multivitamin (THERAGRAN) per tablet Take 1 tablet by mouth Daily.      simvastatin (ZOCOR) 40 MG tablet TAKE ONE TABLET BY MOUTH AT BEDTIME 90 tablet 3    [DISCONTINUED] aspirin 81 MG Chew Take 81 mg by mouth once daily.      [DISCONTINUED] JANUVIA 100 mg Tab TAKE ONE TABLET BY MOUTH ONCE DAILY 30 tablet 0     No current facility-administered medications on file prior to visit.       Patient has been advised to watch diet and exercise. Avoid fried and fatty food. Compliance to medications and follow up urged.    Follow-up in 2-3 months to review diabetes.

## 2018-01-07 LAB
AMBIG ABBREV CMP 14 DEFAULT: NORMAL
BUN SERPL-MCNC: 13 MG/DL (ref 8–27)
BUN/CREAT SERPL: 12 (ref 10–24)
CALCIUM SERPL-MCNC: 10.1 MG/DL (ref 8.6–10.2)
CHLORIDE SERPL-SCNC: 97 MMOL/L (ref 96–106)
CO2 SERPL-SCNC: 23 MMOL/L (ref 18–29)
CREAT SERPL-MCNC: 1.11 MG/DL (ref 0.76–1.27)
EGFR IF AFRICAN AMERICAN: 82 ML/MIN/1.73
EST. GFR  (NON AFRICAN AMERICAN): 71 ML/MIN/1.73
GLUCOSE SERPL-MCNC: 211 MG/DL (ref 65–99)
HBA1C MFR BLD: 8 % (ref 4.8–5.6)
POTASSIUM SERPL-SCNC: 5.9 MMOL/L (ref 3.5–5.2)
SODIUM SERPL-SCNC: 139 MMOL/L (ref 134–144)

## 2018-01-12 ENCOUNTER — OFFICE VISIT (OUTPATIENT)
Dept: FAMILY MEDICINE | Facility: CLINIC | Age: 63
End: 2018-01-12
Payer: COMMERCIAL

## 2018-01-12 VITALS
HEART RATE: 86 BPM | BODY MASS INDEX: 30.36 KG/M2 | WEIGHT: 205 LBS | DIASTOLIC BLOOD PRESSURE: 86 MMHG | SYSTOLIC BLOOD PRESSURE: 136 MMHG | HEIGHT: 69 IN

## 2018-01-12 DIAGNOSIS — E78.2 MULTIPLE-TYPE HYPERLIPIDEMIA: ICD-10-CM

## 2018-01-12 DIAGNOSIS — E87.5 HYPERKALEMIA: ICD-10-CM

## 2018-01-12 DIAGNOSIS — E11.9 WELL CONTROLLED DIABETES MELLITUS: Primary | ICD-10-CM

## 2018-01-12 DIAGNOSIS — I10 BENIGN ESSENTIAL HYPERTENSION: ICD-10-CM

## 2018-01-12 PROCEDURE — 99214 OFFICE O/P EST MOD 30 MIN: CPT | Mod: ,,, | Performed by: INTERNAL MEDICINE

## 2018-01-12 NOTE — PATIENT INSTRUCTIONS
Taking Your Blood Pressure  Blood pressure is the force of blood against the artery wall as it moves from the heart through the blood vessels. You can take your own blood pressure reading using a digital monitor. Take your readings the same each time, using the same arm. Take readings as often as your healthcare provider instructs.  About blood pressure monitors  Blood pressure monitors are designed for certain ages and cases. You can find monitors for older adults, for pregnant women, and for children. Make sure the one you choose is the right one for your age and situation.  The American Heart Association recommends an automatic cuff monitor that fits on your upper arm (bicep). The cuff should fit your arm size. A cuff thats too large or too small will not give an accurate reading. Measure around your upper arm to find your size.  Monitors that attach to your finger or wrist are not as accurate as monitors for your upper arm.  Ask your healthcare provider for help in choosing a monitor. Bring your monitor to your next provider visit if you need help in using it the correct way.  The steps below are general instructions for using an automatic digital monitor.  Step 1. Relax    · Take your blood pressure at the same time every day, such as in the morning or evening, or at the time your healthcare provider recommends.  · Wait at least a half-hour after smoking, eating, or exercising. Don't drink coffee, tea, soda, or other caffeinated beverages before checking your blood pressure.  · Sit comfortably at a table with both feet on the floor. Do not cross your legs or feet. Place the monitor near you.  · Rest for a few minutes before you begin.  Step 2. Wrap the cuff    · Place your arm on the table, palm up. Your arm should be at the level of your heart. Wrap the cuff around your upper arm, just above your elbow. Its best done on bare skin, not over clothing. Most cuffs will indicate where the brachial artery (the  blood vessel in the middle of the arm at the inner side of the elbow) should line up with the cuff. Look in your monitor's instruction booklet for an illustration. You can also bring your cuff to your healthcare provider and have them show you how to correctly place the cuff.  Step 3. Inflate the cuff    · Push the button that starts the pump.  · The cuff will tighten, then loosen.  · The numbers will change. When they stop changing, your blood pressure reading will appear.  · Take 2 or 3 readings one minute apart.  Step 4. Write down the results of each reading    · Write down your blood pressure numbers for each reading. Note the date and time. Keep your results in one place, such as a notebook. Even if your monitor has a built-in memory, keep a hard copy of the readings.  · Remove the cuff from your arm. Turn off the machine.  · Bring your blood pressure records with your healthcare providers at each visit.  · If you start a new blood pressure medicine, note the day you started the new medicine. Also note the day if you change the dose of your medicine. This information goes on your blood pressure recording sheet. This will help your healthcare provider monitor how well the medicine changes are working.  · Ask your healthcare provider what numbers should prompt you to call him or her. Also ask what numbers should prompt you to get help right away.  Date Last Reviewed: 11/1/2016  © 3207-1840 [employer]. [address] [place] [address], [place] [address]. All rights reserved. This information is not intended as a substitute for professional medical care. Always follow your healthcare professional's instructions.

## 2018-01-12 NOTE — PROGRESS NOTES
Subjective:       Patient ID: Mg Torre is a 62 y.o. male.    Chief Complaint: Diabetes (lab review ); Hypertension; and Hyperlipidemia    Mr. Torre is a 62-year-old  male who comes for follow-up upon diabetes, hypertension and dyslipidemia. He is accompanied with his wife.    He will stop Januvia because of admission of Trulicity. His hemoglobin A1c has gone about 8. He is not particularly watchful of his diet. He works as a  and insulin at this point is not an option. He does not regularly monitor his blood sugars either. His wife tries to keep him under leash as far as compliance is concerned.    I will stop losartan last visit because of persistently elevated potassium. After stopping the losartan his potassium seems to be still elevated at 5.8. I suspect he might have some distal renal tubular acidosis.          Diabetes   He presents for his follow-up diabetic visit. He has type 2 diabetes mellitus. No MedicAlert identification noted. His disease course has been worsening. Pertinent negatives for hypoglycemia include no confusion, dizziness, nervousness/anxiousness or pallor. Associated symptoms include fatigue. Pertinent negatives for diabetes include no chest pain, no foot ulcerations, no polydipsia, no polyphagia, no visual change, no weakness and no weight loss. Symptoms are worsening. Pertinent negatives for diabetic complications include no nephropathy or peripheral neuropathy. Risk factors for coronary artery disease include sedentary lifestyle, male sex, hypertension, diabetes mellitus and dyslipidemia. Current diabetic treatment includes oral agent (dual therapy). He is following a generally healthy diet. His breakfast blood glucose range is generally 110-130 mg/dl. An ACE inhibitor/angiotensin II receptor blocker is being taken. He does not see a podiatrist.Eye exam is current.   Hypertension   This is a chronic problem. The current episode started more than 1 year  ago. Pertinent negatives include no chest pain, palpitations or shortness of breath. Risk factors for coronary artery disease include sedentary lifestyle, male gender, obesity and dyslipidemia. Past treatments include beta blockers and diuretics. There is no history of a hypertension causing med or pheochromocytoma.   Hyperlipidemia   This is a chronic problem. The current episode started more than 1 year ago. The problem is controlled. He has no history of hypothyroidism, liver disease or obesity. Pertinent negatives include no chest pain or shortness of breath. Current antihyperlipidemic treatment includes statins. Compliance problems include psychosocial issues.  Risk factors for coronary artery disease include a sedentary lifestyle, hypertension, male sex and dyslipidemia.       Past Medical History:   Diagnosis Date    Allergy     pcn    Diabetes mellitus     Diabetes mellitus, type 2     Hyperlipidemia     Hypertension      Social History     Social History    Marital status:      Spouse name: N/A    Number of children: N/A    Years of education: N/A     Occupational History    Not on file.     Social History Main Topics    Smoking status: Never Smoker    Smokeless tobacco: Never Used    Alcohol use Yes    Drug use: No    Sexual activity: Yes     Partners: Female     Other Topics Concern    Not on file     Social History Narrative    No narrative on file     Past Surgical History:   Procedure Laterality Date    APPENDECTOMY      SPINE SURGERY       Family History   Problem Relation Age of Onset    Heart disease Father        Review of Systems   Constitutional: Positive for fatigue. Negative for activity change, appetite change, unexpected weight change and weight loss.   HENT: Negative for congestion, sneezing and trouble swallowing.    Eyes: Negative for pain and visual disturbance.   Respiratory: Negative for cough, chest tightness and shortness of breath.    Cardiovascular:  "Negative for chest pain, palpitations and leg swelling.        Borderline hypertension.   Gastrointestinal: Negative for abdominal distention and abdominal pain.   Endocrine: Negative for cold intolerance, heat intolerance, polydipsia and polyphagia.        Diabetes mellitus.   Genitourinary: Negative for dysuria.   Musculoskeletal: Negative for arthralgias, back pain and gait problem.        Patient is status post right-sided hip arthroplasty and is recovering gradually with acceptable range of motion and quality of life.   Skin: Negative for pallor, rash and wound.        Extensive psoriasis on the skin especially in the trunk and back.   Neurological: Negative for dizziness and weakness.   Psychiatric/Behavioral: Negative for agitation, behavioral problems and confusion. The patient is not nervous/anxious.        Objective:       Vitals:    01/12/18 0916 01/12/18 0938   BP: (!) 142/79 136/86   Pulse: 86    Weight: 93 kg (205 lb)    Height: 5' 9" (1.753 m)      Physical Exam   Constitutional: He appears well-developed and well-nourished. No distress.   HENT:   Head: Normocephalic and atraumatic.   Eyes: Right eye exhibits no discharge. Left eye exhibits no discharge.   Neck: No tracheal deviation present. No thyromegaly present.   Cardiovascular: Normal rate, regular rhythm and normal heart sounds.    Pulmonary/Chest: Breath sounds normal.   Abdominal: Soft. Bowel sounds are normal. There is no rebound.   Musculoskeletal:   Discomfort on the right thigh.   Lymphadenopathy:     He has no cervical adenopathy.   Skin: Skin is dry. Rash (psoriasis) noted.   Extensive psoriasis noted on the skin in the trunk and back.   Psychiatric: He has a normal mood and affect.       Assessment:       1. Well controlled diabetes mellitus    2. Multiple-type hyperlipidemia    3. Benign essential hypertension    4. Hyperkalemia         Plan:           Well controlled diabetes mellitus  -     dulaglutide (TRULICITY) 1.5 mg/0.5 mL " PnIj; Inject 1.5 mg into the skin every 7 days.  Dispense: 4 Syringe; Refill: 5  -     Hemoglobin A1c; Future; Expected date: 01/12/2018    Multiple-type hyperlipidemia    Benign essential hypertension    Hyperkalemia  -     Basic metabolic panel; Future; Expected date: 01/12/2018    Advised Mr. Torre to monitor Blood sugars at home and record them.  Exercise, watch diet and loose weight.  keep a close eye on feet and keep them clean. Annual eye examination. Annual influenza vaccine.  Monitor HgbA1c every 3 to 6 months. Monitor urine microalbumin every year.keep LDL less than 100. Monitor blood pressure and target blood pressure 120/70.      Patient's hemoglobin A1c is elevated to 8 now. His blood sugar fasting was 211. At this point I will increase injection Trulicity to 1.5 mg every week. We have stopped generally a last time because of conflict with Trulicity. He'll continue with glyburide and metformin. 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 He intends to continue as a  for 6 years.    I will also order a Cologuard test for colon cancer screening.    Follow-up in 3 months to review status.    Current Outpatient Prescriptions on File Prior to Visit   Medication Sig Dispense Refill    amlodipine (NORVASC) 5 MG tablet Take 1 tablet (5 mg total) by mouth once daily. 90 tablet 3    aspirin 325 MG tablet Take 325 mg by mouth 2 (two) times daily.      glyBURIDE (DIABETA) 2.5 MG tablet       hydrochlorothiazide (HYDRODIURIL) 12.5 MG Tab       metformin (GLUCOPHAGE) 500 MG tablet Take 500 mg by mouth 2 (two) times daily with meals.        multivitamin (THERAGRAN) per tablet Take 1 tablet by mouth Daily.      simvastatin (ZOCOR) 40 MG tablet TAKE ONE TABLET BY MOUTH AT BEDTIME 90 tablet 3    [DISCONTINUED] dulaglutide (TRULICITY) 0.75 mg/0.5 mL PnIj Inject 0.5 mLs (0.75 mg total) into the skin once a week. 12 Syringe 3    [DISCONTINUED] oxyCODONE-acetaminophen  (PERCOCET) 5-325 mg per tablet Take 1 tablet by mouth every 4 (four) hours as needed for Pain.       No current facility-administered medications on file prior to visit.      Patient's potassium level is 5.9. He is off ACE inhibitors/ARB's.    He does admit to eats bananas and nuts every day.    He should cut down on banana and nuts. I will check his electrolyte panel and basic metabolic panel in 2 weeks and determined for the course of action. He will check hemoglobin A1c in 3 months.    His GFR is greater than 60 at this point. His creatinine is 1.11.

## 2018-01-28 LAB
BUN SERPL-MCNC: 15 MG/DL (ref 8–27)
BUN/CREAT SERPL: 13 (ref 10–24)
CALCIUM SERPL-MCNC: 10 MG/DL (ref 8.6–10.2)
CHLORIDE SERPL-SCNC: 94 MMOL/L (ref 96–106)
CO2 SERPL-SCNC: 24 MMOL/L (ref 18–29)
CREAT SERPL-MCNC: 1.13 MG/DL (ref 0.76–1.27)
GFR SERPLBLD CREATININE-BSD FMLA CKD-EPI: 69 ML/MIN/1.73
GFR SERPLBLD CREATININE-BSD FMLA CKD-EPI: 80 ML/MIN/1.73
GLUCOSE SERPL-MCNC: 175 MG/DL (ref 65–99)
POTASSIUM SERPL-SCNC: 4.9 MMOL/L (ref 3.5–5.2)
SODIUM SERPL-SCNC: 135 MMOL/L (ref 134–144)

## 2018-01-30 ENCOUNTER — PATIENT MESSAGE (OUTPATIENT)
Dept: FAMILY MEDICINE | Facility: CLINIC | Age: 63
End: 2018-01-30

## 2018-03-01 ENCOUNTER — OFFICE VISIT (OUTPATIENT)
Dept: FAMILY MEDICINE | Facility: CLINIC | Age: 63
End: 2018-03-01

## 2018-03-01 VITALS
TEMPERATURE: 98 F | HEART RATE: 95 BPM | DIASTOLIC BLOOD PRESSURE: 76 MMHG | HEIGHT: 68 IN | BODY MASS INDEX: 30.92 KG/M2 | SYSTOLIC BLOOD PRESSURE: 138 MMHG | WEIGHT: 204 LBS

## 2018-03-01 DIAGNOSIS — Z02.89 PHYSICAL EXAMINATION OF EMPLOYEE: Primary | ICD-10-CM

## 2018-03-01 PROCEDURE — 99203 OFFICE O/P NEW LOW 30 MIN: CPT | Mod: ,,, | Performed by: FAMILY MEDICINE

## 2018-04-16 ENCOUNTER — TELEPHONE (OUTPATIENT)
Dept: FAMILY MEDICINE | Facility: CLINIC | Age: 63
End: 2018-04-16

## 2018-04-16 NOTE — TELEPHONE ENCOUNTER
The following medication needs a prior authorization:     Medication Name: GLYBURIDE    Dosage: 2.5MG    Frequency: DAILY    Directions for use: TAKE ONE TABLET DAILY    Diagnosis: e11.9    Is the request for a reauthorization? yes    Is the patient currently stable on therapy?yes     Please list all therapeutic alternatives previously used with start/end dates and outcome: n/a

## 2018-04-16 NOTE — TELEPHONE ENCOUNTER
----- Message from Irasema Shaw sent at 4/14/2018 10:37 AM CDT -----  Prior authorization, 04/13/2018

## 2018-04-17 ENCOUNTER — PATIENT MESSAGE (OUTPATIENT)
Dept: FAMILY MEDICINE | Facility: CLINIC | Age: 63
End: 2018-04-17

## 2018-04-17 DIAGNOSIS — E11.9 WELL CONTROLLED DIABETES MELLITUS: Primary | ICD-10-CM

## 2018-04-17 RX ORDER — GLIPIZIDE 2.5 MG/1
5 TABLET, EXTENDED RELEASE ORAL
Qty: 180 TABLET | Refills: 3 | Status: SHIPPED | OUTPATIENT
Start: 2018-04-17 | End: 2018-10-26 | Stop reason: SDUPTHER

## 2018-04-17 RX ORDER — GLYBURIDE 2.5 MG/1
TABLET ORAL
Qty: 180 TABLET | Refills: 3 | OUTPATIENT
Start: 2018-04-17

## 2018-04-22 LAB — HBA1C MFR BLD: 7.2 % (ref 4.8–5.6)

## 2018-04-26 PROBLEM — E11.9 TYPE 2 DIABETES MELLITUS WITHOUT COMPLICATION, WITHOUT LONG-TERM CURRENT USE OF INSULIN: Status: ACTIVE | Noted: 2018-04-26

## 2018-04-27 ENCOUNTER — OFFICE VISIT (OUTPATIENT)
Dept: FAMILY MEDICINE | Facility: CLINIC | Age: 63
End: 2018-04-27
Payer: COMMERCIAL

## 2018-04-27 VITALS
HEIGHT: 68 IN | SYSTOLIC BLOOD PRESSURE: 120 MMHG | HEART RATE: 85 BPM | DIASTOLIC BLOOD PRESSURE: 80 MMHG | BODY MASS INDEX: 30.77 KG/M2 | WEIGHT: 203 LBS | TEMPERATURE: 98 F

## 2018-04-27 DIAGNOSIS — I10 BENIGN ESSENTIAL HYPERTENSION: Primary | ICD-10-CM

## 2018-04-27 DIAGNOSIS — E11.9 TYPE 2 DIABETES MELLITUS WITHOUT COMPLICATION, WITHOUT LONG-TERM CURRENT USE OF INSULIN: ICD-10-CM

## 2018-04-27 DIAGNOSIS — E78.2 MULTIPLE-TYPE HYPERLIPIDEMIA: ICD-10-CM

## 2018-04-27 PROCEDURE — 3079F DIAST BP 80-89 MM HG: CPT | Mod: ,,, | Performed by: INTERNAL MEDICINE

## 2018-04-27 PROCEDURE — 3074F SYST BP LT 130 MM HG: CPT | Mod: ,,, | Performed by: INTERNAL MEDICINE

## 2018-04-27 PROCEDURE — 99214 OFFICE O/P EST MOD 30 MIN: CPT | Mod: ,,, | Performed by: INTERNAL MEDICINE

## 2018-04-27 PROCEDURE — 3045F PR MOST RECENT HEMOGLOBIN A1C LEVEL 7.0-9.0%: CPT | Mod: ,,, | Performed by: INTERNAL MEDICINE

## 2018-04-27 NOTE — PROGRESS NOTES
Subjective:       Patient ID: Mg Torre is a 63 y.o. male.    Chief Complaint: Hypertension; Diabetes; Sore Throat; and Hyperlipidemia    Mr       Diabetes   He presents for his follow-up diabetic visit. He has type 2 diabetes mellitus. Pertinent negatives for hypoglycemia include no confusion, dizziness, headaches, nervousness/anxiousness or pallor. Associated symptoms include fatigue. Pertinent negatives for diabetes include no blurred vision, no chest pain, no polydipsia, no polyphagia, no weakness and no weight loss. Pertinent negatives for diabetic complications include no CVA, PVD or retinopathy. Current diabetic treatment includes oral agent (dual therapy) (Trulicity). Meal planning includes avoidance of concentrated sweets. He never participates in exercise. His home blood glucose trend is decreasing steadily. His breakfast blood glucose range is generally 110-130 mg/dl.   Hypertension   This is a chronic problem. The current episode started more than 1 year ago. The problem is controlled. Pertinent negatives include no anxiety, blurred vision, chest pain, headaches, palpitations or shortness of breath. There are no associated agents to hypertension. Risk factors for coronary artery disease include male gender, dyslipidemia, diabetes mellitus and sedentary lifestyle. Past treatments include diuretics and calcium channel blockers. The current treatment provides moderate improvement. There is no history of CAD/MI, CVA, heart failure, left ventricular hypertrophy, PVD or retinopathy. There is no history of a hypertension causing med.   Hyperlipidemia   This is a chronic problem. The current episode started more than 1 year ago. The problem is controlled. Exacerbating diseases include diabetes and obesity. He has no history of hypothyroidism or liver disease. Pertinent negatives include no chest pain or shortness of breath. Current antihyperlipidemic treatment includes statins. The current treatment  provides moderate improvement of lipids. Compliance problems include adherence to exercise.  Risk factors for coronary artery disease include hypertension, diabetes mellitus, a sedentary lifestyle, male sex and dyslipidemia.       Past Medical History:   Diagnosis Date    Allergy     pcn    Diabetes mellitus     Diabetes mellitus, type 2     Hyperlipidemia     Hypertension      Social History     Social History    Marital status:      Spouse name: N/A    Number of children: N/A    Years of education: N/A     Occupational History    Not on file.     Social History Main Topics    Smoking status: Never Smoker    Smokeless tobacco: Never Used    Alcohol use Yes    Drug use: No    Sexual activity: Yes     Partners: Female     Other Topics Concern    Not on file     Social History Narrative    No narrative on file     Past Surgical History:   Procedure Laterality Date    APPENDECTOMY      SPINE SURGERY       Family History   Problem Relation Age of Onset    Heart disease Father        Review of Systems   Constitutional: Positive for fatigue. Negative for activity change, appetite change, unexpected weight change (lost 2 lbs) and weight loss.   HENT: Negative for congestion, sneezing and trouble swallowing.    Eyes: Negative for blurred vision, pain and visual disturbance.   Respiratory: Negative for cough, chest tightness and shortness of breath.    Cardiovascular: Negative for chest pain, palpitations and leg swelling.        Borderline hypertension.   Gastrointestinal: Negative for abdominal distention and abdominal pain.   Endocrine: Negative for cold intolerance, heat intolerance, polydipsia and polyphagia.        Diabetes mellitus.   Genitourinary: Negative for dysuria.   Musculoskeletal: Negative for arthralgias, back pain and gait problem.        Patient is status post right-sided hip arthroplasty and is recovering gradually with acceptable range of motion and quality of life.   Skin:  "Negative for pallor, rash and wound.        Extensive psoriasis on the skin especially in the trunk and back.   Neurological: Negative for dizziness, weakness and headaches.   Psychiatric/Behavioral: Negative for agitation, behavioral problems and confusion. The patient is not nervous/anxious.        Objective:       Vitals:    04/27/18 0832 04/27/18 0908   BP: (!) 140/75 120/80   Pulse: 85    Temp: 97.5 °F (36.4 °C)    Weight: 92.1 kg (203 lb)    Height: 5' 8" (1.727 m)      Physical Exam   Constitutional: He appears well-developed and well-nourished. No distress.   HENT:   Head: Normocephalic and atraumatic.   Eyes: Right eye exhibits no discharge. Left eye exhibits no discharge.   Neck: No tracheal deviation present. No thyromegaly present.   Cardiovascular: Normal rate, regular rhythm and normal heart sounds.    Pulmonary/Chest: Breath sounds normal.   Abdominal: Soft. Bowel sounds are normal. There is no rebound.   Musculoskeletal:   Discomfort on the right thigh.   Lymphadenopathy:     He has no cervical adenopathy.   Skin: Skin is dry. Rash (psoriasis) noted.   Extensive psoriasis noted on the skin in the trunk and back.   Psychiatric: He has a normal mood and affect.       Assessment:       1. Benign essential hypertension    2. Multiple-type hyperlipidemia    3. Type 2 diabetes mellitus without complication, without long-term current use of insulin         7.2   8.0CM   6.1CM   6.5CM         Plan:           Benign essential hypertension  -     Basic metabolic panel; Future; Expected date: 04/27/2018    Multiple-type hyperlipidemia  -     Lipid panel; Future; Expected date: 04/27/2018  -     ALT (SGPT); Future; Expected date: 04/27/2018    Type 2 diabetes mellitus without complication, without long-term current use of insulin    Advised Mr. Torre to monitor Blood sugars at home and record them.  Exercise, watch diet and loose weight.  keep a close eye on feet and keep them clean. Annual eye examination. " Annual influenza vaccine.  Monitor HgbA1c every 3 to 6 months. Monitor urine microalbumin every year.keep LDL less than 100. Monitor blood pressure and target blood pressure 120/70.    The patient is asked to make an attempt to improve diet and exercise patterns to aid in medical management of this problem.

## 2018-04-27 NOTE — PATIENT INSTRUCTIONS
Exercise for a Healthier Heart  You may wonder how you can improve the health of your heart. If youre thinking about exercise, youre on the right track. You dont need to become an athlete, but you do need a certain amount of brisk exercise to help strengthen your heart. If you have been diagnosed with a heart condition, your doctor may recommend exercise to help stabilize your condition. To help make exercise a habit, choose safe, fun activities.     Exercise with a friend. When activity is fun, you're more likely to stick with it.     Be sure to check with your healthcare provider before starting an exercise program.   Why exercise?  Exercising regularly offers many healthy rewards. It can help you do all of the following:  · Improve your blood cholesterol level to help prevent further heart trouble  · Lower your blood pressure to help prevent a stroke or heart attack  · Control diabetes, or reduce your risk of getting this disease  · Improve your heart and lung function  · Reach and maintain a healthy weight  · Make your muscles stronger and more limber so you can stay active  · Prevent falls and fractures by slowing the loss of bone mass (osteoporosis)  · Manage stress better  · Reduce your blood pressure  · Improve your sense of self and your body image  Exercise tips  Ease into your routine. Set small goals. Then build on them.  Exercise on most days. Aim for a total of 150 or more minutes of moderate to  vigorous intensity activity each week. Consider 40 minutes, 3 to 4 times a week. For best results, activity should last for 40 minutes on average. It is OK to work up to the 40 minute period over time. Examples of moderate-intensity activity is walking 1 mile in 15 minutes or 30 to 45 minutes of yard work.  Step up your daily activity level. Along with your exercise program, try being more active throughout the day. Walk instead of drive. Do more household tasks or yard work.  Choose one or more  activities you enjoy. Walking is one of the easiest things you can do. You can also try swimming, riding a bike, dancing, or taking an exercise class.  Stop exercising and call your doctor if you:  · Have chest pain or feel dizzy or lightheaded  · Feel burning, tightness, pressure, or heaviness in your chest, neck, shoulders, back, or arms  · Have unusual shortness of breath  · Have increased joint or muscle pain  · Have palpitations or an irregular heartbeat   Date Last Reviewed: 5/1/2016 © 2000-2017 [employer]. [address] [place] [address]. All rights reserved. This information is not intended as a substitute for professional medical care. Always follow your healthcare professional's instructions.        Diabetes: Getting Started with Exercise    Getting started is easier than you think. Simple and small movements can get you started on a regular exercise routine. You dont need to join a gym to start moving. Choose an activity you enjoy. Start slowly and set small goals. Work activity into your daily life. Talk to your healthcare provider before starting an activity program. You may need to have a checkup before you begin.  Start with movement  If youre not used to being active, start with gentle movements while you watch TV. Raise your arms and legs while seated. Then repeat for 5 to 10 minutes. With time, add some slow walking. Even taking a flight of stairs instead of the elevator can lift you to healthier heights. These types of brief activities are great ways to get started. They can help lower your blood sugar level, strengthen your heart, and improve your energy.  Steps toward being more active  Your goal, especially at first, is to keep your activity simple. Slowly work up to 30 minutes of activity a day. But you dont need to do it all at once. You can be active in 3, 10-minute sessions a day. You can also combine being active with the other things you need to do. For  instance, stand up from your desk and walk around often when at work. Or, go for a walk around the mall before you shop.  Keep your activity simple  Why make activity hard on yourself? Choose things that you like to do and that fit into your schedule. Here are some tips:  · Get off the bus a stop or two early and walk the rest of the way.  · Run small shopping errands on your bike.  · Go for a 10-minute walk after each meal.  · Park your car in the space farthest from where youre going.  · Get a pedometer that records the number of steps you take. Make a goal for the number of steps you take each day. Increase your goal a little each week.  Keep your activity safe  Safety tips include the following:   · Be sure to warm up before you start and cool down when youre done.  · Carry or wear identification, such as a necklace or bracelet, that says that you have diabetes.  · Carry a cell phone with you in case you need to call for assistance.  · Stay well hydrated before, during, and after your exercise.    · Eat 1 to 2 hours before you exercise, if instructed.  · Check your blood sugar before and after you exercise, if instructed. Check your blood sugar if you feel symptoms.  · Carry fast-acting sugar with you in case you have low blood sugar.  · Wear socks and well-fitting shoes. Check your feet for blisters or redness after the exercise.   · Think about the weather in your area. At times, you may need to choose indoor rather than outdoor activities.  · Stop the activity if you feel any pain, shortness of breath, or light-headedness.  Make your activity fun  Mix fitness with fun. The more fun you have, the more likely you are to stick to your plan. You can have a better blood sugar level along with an active, fun day. Try these hints:  · Choose an exercise that you enjoy and can do easily.  · Join a social club that goes for walks or does other physical activities.  · Go bird watching or do something else that gets  you outdoors.  · Put on some music and dance.  · Involve your family or friends in your physical activity.  Date Last Reviewed: 5/1/2016 © 2000-2017 [employer] [address] [place] [address], [place] [address]. All rights reserved. This information is not intended as a substitute for professional medical care. Always follow your healthcare professional's instructions.

## 2018-05-14 RX ORDER — HYDROCHLOROTHIAZIDE 12.5 MG/1
TABLET ORAL
Qty: 90 TABLET | Refills: 3 | Status: SHIPPED | OUTPATIENT
Start: 2018-05-14 | End: 2019-06-03 | Stop reason: SDUPTHER

## 2018-05-28 RX ORDER — METFORMIN HYDROCHLORIDE 500 MG/1
TABLET ORAL
Qty: 180 TABLET | Refills: 4 | Status: SHIPPED | OUTPATIENT
Start: 2018-05-28 | End: 2019-01-10 | Stop reason: SDUPTHER

## 2018-07-07 DIAGNOSIS — E11.9 WELL CONTROLLED DIABETES MELLITUS: ICD-10-CM

## 2018-07-08 RX ORDER — DULAGLUTIDE 1.5 MG/.5ML
INJECTION, SOLUTION SUBCUTANEOUS
Qty: 12 SYRINGE | Refills: 3 | Status: SHIPPED | OUTPATIENT
Start: 2018-07-08 | End: 2019-07-05 | Stop reason: SDUPTHER

## 2018-07-22 LAB
ALT SERPL-CCNC: 19 IU/L (ref 0–44)
BUN SERPL-MCNC: 12 MG/DL (ref 8–27)
BUN/CREAT SERPL: 10 (ref 10–24)
CALCIUM SERPL-MCNC: 9.6 MG/DL (ref 8.6–10.2)
CHLORIDE SERPL-SCNC: 96 MMOL/L (ref 96–106)
CHOLEST SERPL-MCNC: 131 MG/DL (ref 100–199)
CO2 SERPL-SCNC: 24 MMOL/L (ref 20–29)
CREAT SERPL-MCNC: 1.21 MG/DL (ref 0.76–1.27)
EGFR IF AFRICAN AMERICAN: 73 ML/MIN/1.73
EST. GFR  (NON AFRICAN AMERICAN): 63 ML/MIN/1.73
GLUCOSE SERPL-MCNC: 216 MG/DL (ref 65–99)
HDLC SERPL-MCNC: 45 MG/DL
LDLC SERPL CALC-MCNC: 71 MG/DL (ref 0–99)
POTASSIUM SERPL-SCNC: 4.7 MMOL/L (ref 3.5–5.2)
SODIUM SERPL-SCNC: 136 MMOL/L (ref 134–144)
TRIGL SERPL-MCNC: 73 MG/DL (ref 0–149)
VLDLC SERPL CALC-MCNC: 15 MG/DL (ref 5–40)

## 2018-07-27 ENCOUNTER — OFFICE VISIT (OUTPATIENT)
Dept: FAMILY MEDICINE | Facility: CLINIC | Age: 63
End: 2018-07-27
Payer: COMMERCIAL

## 2018-07-27 VITALS
BODY MASS INDEX: 30.46 KG/M2 | DIASTOLIC BLOOD PRESSURE: 75 MMHG | WEIGHT: 201 LBS | HEART RATE: 81 BPM | SYSTOLIC BLOOD PRESSURE: 131 MMHG | HEIGHT: 68 IN

## 2018-07-27 DIAGNOSIS — I10 BENIGN ESSENTIAL HYPERTENSION: Primary | ICD-10-CM

## 2018-07-27 DIAGNOSIS — E78.2 MULTIPLE-TYPE HYPERLIPIDEMIA: ICD-10-CM

## 2018-07-27 DIAGNOSIS — E11.9 TYPE 2 DIABETES MELLITUS WITHOUT COMPLICATION, WITHOUT LONG-TERM CURRENT USE OF INSULIN: ICD-10-CM

## 2018-07-27 PROCEDURE — 3008F BODY MASS INDEX DOCD: CPT | Mod: ,,, | Performed by: INTERNAL MEDICINE

## 2018-07-27 PROCEDURE — 99213 OFFICE O/P EST LOW 20 MIN: CPT | Mod: ,,, | Performed by: INTERNAL MEDICINE

## 2018-07-27 PROCEDURE — 3078F DIAST BP <80 MM HG: CPT | Mod: ,,, | Performed by: INTERNAL MEDICINE

## 2018-07-27 PROCEDURE — 3045F PR MOST RECENT HEMOGLOBIN A1C LEVEL 7.0-9.0%: CPT | Mod: ,,, | Performed by: INTERNAL MEDICINE

## 2018-07-27 PROCEDURE — 3075F SYST BP GE 130 - 139MM HG: CPT | Mod: ,,, | Performed by: INTERNAL MEDICINE

## 2018-07-27 NOTE — PATIENT INSTRUCTIONS
"  Exercise to Manage Your Blood Sugar    Being physically active every day can help you manage your blood sugar. Thats because an active lifestyle can improve your bodys ability to use insulin. Daily activity can also help delay or prevent complications of diabetes. And its a great way to relieve stress. If you arent normally active, be sure to consult your healthcare provider before getting started.  How much activity do you need?  If daily activity is new to you, start slow and steady. Begin with 10 minutes of activity each day. Then work up to at least 150 minutes a week of physical activity. Don't let more than 2 days go by without being active. When sitting for long periods of time, get up for short sessions of light activity every 30 minutes  Just move!  You dont have to join a gym or own pricey sports equipment. Just get out and walk. Walking is an aerobic exercise that makes your heart and lungs work hard. It helps your heart and blood vessels. Walking needs only a sturdy pair of sneakers and your own two feet. The more you walk, the easier it gets:  · Schedule time every day to move your feet.  · Make it part of your daily routine.  · Walk with a friend or a group to keep it interesting and fun.  · Try taking several short walks during the day to meet your daily activity goal.  A pedometer makes every step count  A pedometer is a small device that keeps track of how many steps you take. You can clip it to your belt (or a strap on your arm or leg) and go about your daily routine. "Smartphones" now also have apps to record your walking. At the end of the day, the pedometer shows the total number of steps you took. Use a pedometer to set daily goals for yourself. For instance, if you walk 4,000 steps a day, try adding 200 more steps each day. Aim for a goal of 7,500. With every step, youre doing a little more to help your body use insulin.   Adding resistance exercise  Resistance exercise (also called " strength training), makes muscles stronger. It also helps muscles use insulin better. Ask your healthcare provider whether this type of exercise is right for you. If it is, your healthcare provider can help you work it in to your activity plan.  Staying safe  Being active may cause blood sugar to drop faster than usual. This is especially true if you take medicine to manage your blood sugar. But there are things you can do to help reduce the risk of accidental lows. Keep these tips in mind:  · Always carry identification when you exercise outside your home. Carry a cell phone to use in case of emergency.  · If you can, include friends and family in your activities.  · Wear a medical ID bracelet that says you have diabetes.  · Use the right safety equipment for the activity you do (such as a bicycle helmet when you ride a bicycle outdoors). Wear closed-toed shoes that fit your feet well.  · Drink plenty of water before and during activity.  · Keep a fast-acting sugar (such as glucose tablets) on hand in case of low blood sugar.  · Dress properly for the weather. Wear a hat if its sri, or wait until evening if its too hot.  · Avoid being active for long periods in very hot or very cold weather.  · Skip activity if youre sick.     Notice how activity affects blood sugar  Physical activity is important when you have diabetes. But you need to keep an eye on your blood sugar level. Check often if you have been active for longer than usual, or if the activity was unplanned. Make it a habit to check your blood sugar before being active. And check again a few hours later. Use your log book to write down how activity affects your numbers. If you take insulin, you may be able to adjust your dose before a planned activity. This can help prevent lows. You may also need to take a small carbohydrate snack before the exercise. Talk to your healthcare provider to learn more.    Date Last Reviewed: 6/1/2016  © 7366-9599 [employer]  [employer]. [address] [place] [address], [place] [address]. All rights reserved. This information is not intended as a substitute for professional medical care. Always follow your healthcare professional's instructions.        Diabetic Foot Care  Diabetes can lead to a number of foot complications. Fortunately, you can prevent most of these with a little extra foot care. If diabetes is not well controlled, it can cause damage to blood vessels and result in poor circulation to the foot. When the skin does not get enough blood flow, it becomes prone to pressure sores and ulcers, which heal slowly.  Diabetes can also damage nerves, interfering with the ability to feel pain and pressure. When you cant feel your foot normally, it is easy to injure your skin, bones, and joints without knowing it. For these reasons diabetes increases the risk of fungal infections, bunions, and ulcers. An ulcer is a sore or break in the skin. With ulcers, often the skin seems to have worn away. Deep ulcers can lead to bone infection.  Gangrene is the most serious foot complication of diabetes. It usually occurs on the tips of the toes as blackened areas of skin. The black area is dead tissue. In severe cases, gangrene spreads to involve the entire toe, other toes, and the entire foot. Foot or toe amputation may be required. Good foot care and blood sugar control can prevent this.  Home care  · Wear comfortable, well-fitting shoes.  · Wash your feet daily with warm water and mild soap.  · After drying, apply a moisturizing cream or lotion to the top and bottom of your feet. Don't put lotion between toes.  · Check your feet daily for skin breaks, blisters, swelling, or redness. Look between your toes as well. If you cannot see the bottoms of your feet, ask someone to look or use a mirror.  · Wear cotton socks and change them every day.  · Trim toenails carefully, and do not cut your cuticles.  · Strive to keep your blood sugar under  control with a combination of medicines, diet, and activity.  · If you smoke and have diabetes, it is very important that you stop. Smoking reduces blood flow to your foot.  · Schedule foot exams at least every year, or more often if you have foot problems.  · Put your feet up when sitting, wiggle toes, and move ankles to help improve blood flow.  Avoid activities that increase your risk of foot injury:  · Do not walk barefoot.  · Do not use heating pads or hot water bottles on your feet.  · Do not put your foot in a hot tub without first checking the temperature with your hand.  Follow-up care  Follow up with your healthcare provider, or as advised. Be sure to take off your shoes and socks before your appointment starts so your healthcare provider will be sure to check your feet. Report any cut, puncture, scrape, blister, or other injury to your foot. Also report if you have a bunion, hammertoes, ingrown toenail, or ulcer on your foot.  When to seek medical advice  Call your healthcare provider right away if any of these occur:  · Black skin color anywhere on the foot  · Open ulcer with pus draining from the wound  · Increasing foot or leg pain  · New areas of redness or swelling or tender areas of the foot  · Fever of 100.4°F (38°C) or greater  Date Last Reviewed: 5/25/2016  © 9356-1393 [employer]. [address] [place] [address], [place] [address]. All rights reserved. This information is not intended as a substitute for professional medical care. Always follow your healthcare professional's instructions.

## 2018-07-27 NOTE — PROGRESS NOTES
"Subjective:       Patient ID: Chuyita Torre is a 63 y.o. male.    Chief Complaint: Diabetes (lab review ); Hypertension; and Hyperlipidemia    "Nudy " chuyita torre is a follow-up. He is accompanied with his wife also. Sometimes last month he was off his medications for 4 days and his blood sugars went up. He is currently on a combination of metformin, glipizide and injection Trulicity. He is tolerating the injection Trulicity fairly well. His morning sugar a few days back was greater than 200.    Recent labs have been reviewed and they show an excellent lipid panel. ALT is normal. Creatinine is also the slightly high normal side.    His blood pressures are doing okay.    Please note that he is not taking losartan because of history of hyperkalemia in past.      Diabetes   He presents for his follow-up diabetic visit. He has type 2 diabetes mellitus. Hypoglycemia symptoms include confusion. Pertinent negatives for hypoglycemia include no dizziness, headaches, nervousness/anxiousness or pallor. Associated symptoms include fatigue. Pertinent negatives for diabetes include no blurred vision, no chest pain, no polydipsia, no polyphagia, no weakness and no weight loss. Pertinent negatives for diabetic complications include no CVA, PVD or retinopathy. Current diabetic treatment includes oral agent (dual therapy) (Trulicity). Meal planning includes avoidance of concentrated sweets. He never participates in exercise. His home blood glucose trend is decreasing steadily. His breakfast blood glucose range is generally 110-130 mg/dl. An ACE inhibitor/angiotensin II receptor blocker is being taken.   Hypertension   This is a chronic problem. The current episode started more than 1 year ago. The problem is controlled. Pertinent negatives include no anxiety, blurred vision, chest pain, headaches, palpitations or shortness of breath. There are no associated agents to hypertension. Risk factors for coronary artery disease " include male gender, dyslipidemia, diabetes mellitus and sedentary lifestyle. Past treatments include diuretics and calcium channel blockers. The current treatment provides moderate improvement. There is no history of CAD/MI, CVA, heart failure, left ventricular hypertrophy, PVD or retinopathy. There is no history of hyperaldosteronism, a hypertension causing med or renovascular disease.   Hyperlipidemia   This is a chronic problem. The current episode started more than 1 year ago. The problem is controlled. Exacerbating diseases include diabetes and obesity. He has no history of hypothyroidism or liver disease. Pertinent negatives include no chest pain or shortness of breath. Current antihyperlipidemic treatment includes statins. The current treatment provides moderate improvement of lipids. Compliance problems include adherence to exercise.  Risk factors for coronary artery disease include hypertension, diabetes mellitus, a sedentary lifestyle, male sex and dyslipidemia.       Past Medical History:   Diagnosis Date    Allergy     pcn    Diabetes mellitus     Diabetes mellitus, type 2     Hyperlipidemia     Hypertension      Social History     Social History    Marital status:      Spouse name: N/A    Number of children: N/A    Years of education: N/A     Occupational History    Not on file.     Social History Main Topics    Smoking status: Never Smoker    Smokeless tobacco: Never Used    Alcohol use Yes    Drug use: No    Sexual activity: Yes     Partners: Female     Other Topics Concern    Not on file     Social History Narrative    No narrative on file     Past Surgical History:   Procedure Laterality Date    APPENDECTOMY      SPINE SURGERY       Family History   Problem Relation Age of Onset    Heart disease Father        Review of Systems   Constitutional: Positive for fatigue. Negative for activity change, appetite change, unexpected weight change (lost 2 lbs) and weight loss.  "  HENT: Negative for congestion, sneezing and trouble swallowing.    Eyes: Negative for blurred vision, pain and visual disturbance.   Respiratory: Negative for cough, chest tightness and shortness of breath.    Cardiovascular: Negative for chest pain, palpitations and leg swelling.        Borderline hypertension.   Gastrointestinal: Negative for abdominal distention and abdominal pain.   Endocrine: Negative for cold intolerance, heat intolerance, polydipsia and polyphagia.        Diabetes mellitus.   Genitourinary: Negative for dysuria.   Musculoskeletal: Negative for arthralgias, back pain and gait problem.        Patient is status post right-sided hip arthroplasty and is recovering gradually with acceptable range of motion and quality of life.   Skin: Negative for pallor, rash and wound.        Extensive psoriasis on the skin especially in the trunk and back.   Neurological: Negative for dizziness, weakness and headaches.   Psychiatric/Behavioral: Positive for confusion. Negative for agitation and behavioral problems. The patient is not nervous/anxious.        Objective:       Vitals:    07/27/18 0812   BP: 131/75   Pulse: 81   Weight: 91.2 kg (201 lb)   Height: 5' 8" (1.727 m)     Physical Exam   Constitutional: He appears well-developed and well-nourished. No distress.   HENT:   Head: Normocephalic and atraumatic.   Eyes: Right eye exhibits no discharge. Left eye exhibits no discharge.   Neck: No tracheal deviation present. No thyromegaly present.   Cardiovascular: Normal rate, regular rhythm and normal heart sounds.    Pulmonary/Chest: Breath sounds normal.   Abdominal: Soft. Bowel sounds are normal. There is no rebound.   Musculoskeletal:   Discomfort on the right thigh.   Lymphadenopathy:     He has no cervical adenopathy.   Skin: Skin is dry. Rash (psoriasis) noted.   Extensive psoriasis noted on the skin in the trunk and back.   Psychiatric: He has a normal mood and affect.       Component      Latest Ref " Rng & Units 7/21/2018   Glucose      65 - 99 mg/dL 216 (H)   BUN, Bld      8 - 27 mg/dL 12   Creatinine      0.76 - 1.27 mg/dL 1.21   eGFR if non African American      >59 mL/min/1.73 63   eGFR if       >59 mL/min/1.73 73   BUN/Creatinine Ratio      10 - 24 10   Sodium      134 - 144 mmol/L 136   Potassium      3.5 - 5.2 mmol/L 4.7   Chloride      96 - 106 mmol/L 96   CO2      20 - 29 mmol/L 24   Calcium      8.6 - 10.2 mg/dL 9.6   Cholesterol      100 - 199 mg/dL 131   Triglycerides      0 - 149 mg/dL 73   HDL      >39 mg/dL 45   VLDL Cholesterol Dinesh      5 - 40 mg/dL 15   LDL Calculated      0 - 99 mg/dL 71   ALT      0 - 44 IU/L 19     Assessment:       1. Benign essential hypertension    2. Multiple-type hyperlipidemia    3. Type 2 diabetes mellitus without complication, without long-term current use of insulin         7.2   8.0CM   6.1CM   6.5CM     Lipid panel   Order: [id number]   Status:  Final result   Visible to patient:  Yes (Patient Portal)   Next appt:  None   Dx:  Multiple-type hyperlipidemia    Ref Range & Units 6d ago   Cholesterol 100 - 199 mg/dL 131    Triglycerides 0 - 149 mg/dL 73    HDL >39 mg/dL 45    VLDL Cholesterol Dinesh 5 - 40 mg/dL 15    LDL Calculated 0 - 99 mg/dL 71    Resulting Agency  [facility]               Plan:           Benign essential hypertension    Multiple-type hyperlipidemia    Type 2 diabetes mellitus without complication, without long-term current use of insulin  -     Hemoglobin A1c; Future; Expected date: 07/27/2018    Advised Mr. Torre to monitor Blood sugars at home and record them.  Exercise, watch diet and loose weight.  keep a close eye on feet and keep them clean. Annual eye examination. Annual influenza vaccine.  Monitor HgbA1c every 3 to 6 months. Monitor urine microalbumin every year.keep LDL less than 100. Monitor blood pressure and target blood pressure 120/70.    The patient is asked to make an attempt to improve diet and exercise patterns to aid  in medical management of this problem.

## 2018-09-09 DIAGNOSIS — I10 BENIGN ESSENTIAL HYPERTENSION: ICD-10-CM

## 2018-09-09 RX ORDER — AMLODIPINE BESYLATE 5 MG/1
TABLET ORAL
Qty: 90 TABLET | Refills: 3 | Status: SHIPPED | OUTPATIENT
Start: 2018-09-09 | End: 2018-10-26 | Stop reason: SDUPTHER

## 2018-10-08 RX ORDER — SIMVASTATIN 40 MG/1
TABLET, FILM COATED ORAL
Qty: 90 TABLET | Refills: 3 | Status: SHIPPED | OUTPATIENT
Start: 2018-10-08 | End: 2018-10-08 | Stop reason: SDUPTHER

## 2018-10-08 RX ORDER — SIMVASTATIN 40 MG/1
40 TABLET, FILM COATED ORAL NIGHTLY
Qty: 90 TABLET | Refills: 3 | Status: SHIPPED | OUTPATIENT
Start: 2018-10-08 | End: 2019-01-21

## 2018-10-21 LAB — HBA1C MFR BLD: 7.7 % (ref 4.8–5.6)

## 2018-10-26 ENCOUNTER — OFFICE VISIT (OUTPATIENT)
Dept: FAMILY MEDICINE | Facility: CLINIC | Age: 63
End: 2018-10-26
Payer: COMMERCIAL

## 2018-10-26 VITALS
DIASTOLIC BLOOD PRESSURE: 76 MMHG | WEIGHT: 204 LBS | SYSTOLIC BLOOD PRESSURE: 142 MMHG | BODY MASS INDEX: 30.92 KG/M2 | HEIGHT: 68 IN | HEART RATE: 83 BPM

## 2018-10-26 DIAGNOSIS — Z23 INFLUENZA VACCINE ADMINISTERED: ICD-10-CM

## 2018-10-26 DIAGNOSIS — I10 BENIGN ESSENTIAL HYPERTENSION: Primary | ICD-10-CM

## 2018-10-26 DIAGNOSIS — E11.9 TYPE 2 DIABETES MELLITUS WITHOUT COMPLICATION, WITHOUT LONG-TERM CURRENT USE OF INSULIN: ICD-10-CM

## 2018-10-26 DIAGNOSIS — E78.2 MULTIPLE-TYPE HYPERLIPIDEMIA: ICD-10-CM

## 2018-10-26 DIAGNOSIS — E11.9 WELL CONTROLLED DIABETES MELLITUS: ICD-10-CM

## 2018-10-26 PROCEDURE — 90471 IMMUNIZATION ADMIN: CPT | Mod: ,,, | Performed by: INTERNAL MEDICINE

## 2018-10-26 PROCEDURE — 3045F PR MOST RECENT HEMOGLOBIN A1C LEVEL 7.0-9.0%: CPT | Mod: ,,, | Performed by: INTERNAL MEDICINE

## 2018-10-26 PROCEDURE — 90686 IIV4 VACC NO PRSV 0.5 ML IM: CPT | Mod: ,,, | Performed by: INTERNAL MEDICINE

## 2018-10-26 PROCEDURE — 3008F BODY MASS INDEX DOCD: CPT | Mod: ,,, | Performed by: INTERNAL MEDICINE

## 2018-10-26 PROCEDURE — 3078F DIAST BP <80 MM HG: CPT | Mod: ,,, | Performed by: INTERNAL MEDICINE

## 2018-10-26 PROCEDURE — 99214 OFFICE O/P EST MOD 30 MIN: CPT | Mod: 25,,, | Performed by: INTERNAL MEDICINE

## 2018-10-26 PROCEDURE — 3077F SYST BP >= 140 MM HG: CPT | Mod: ,,, | Performed by: INTERNAL MEDICINE

## 2018-10-26 RX ORDER — GLIPIZIDE 5 MG/1
5 TABLET, FILM COATED, EXTENDED RELEASE ORAL
Qty: 90 TABLET | Refills: 3 | Status: SHIPPED | OUTPATIENT
Start: 2018-10-26 | End: 2019-01-25 | Stop reason: SDUPTHER

## 2018-10-26 RX ORDER — AMLODIPINE BESYLATE 10 MG/1
10 TABLET ORAL DAILY
Qty: 90 TABLET | Refills: 2 | Status: SHIPPED | OUTPATIENT
Start: 2018-10-26 | End: 2019-08-29 | Stop reason: SDUPTHER

## 2018-10-26 NOTE — PROGRESS NOTES
"Subjective:       Patient ID: Chuyita Torre is a 63 y.o. male.    Chief Complaint: Hypertension (LAB REVIEW); Diabetes; and Hyperlipidemia    "Nudy " chuyita torre is a follow-up. He is not accompanied with his wife today.     Hemoglobin A1c is 7.7. Blood pressures are somewhat elevated. He also complains of arthritic pains. Currently he is taking tablet metformin, glipizide and injection Trulicity. Injection Trulicity is covered by his insurance. He does not experience any major side effects.    His blood pressures are somewhat elevated.      Hypertension   This is a chronic problem. The current episode started more than 1 year ago. The problem is controlled. Pertinent negatives include no anxiety, blurred vision, chest pain, headaches, palpitations or shortness of breath. There are no associated agents to hypertension. Risk factors for coronary artery disease include male gender, dyslipidemia, diabetes mellitus and sedentary lifestyle. Past treatments include diuretics and calcium channel blockers. The current treatment provides moderate improvement. There is no history of CAD/MI, CVA, heart failure, left ventricular hypertrophy, PVD or retinopathy. There is no history of hyperaldosteronism, a hypertension causing med or renovascular disease.   Diabetes   He presents for his follow-up diabetic visit. He has type 2 diabetes mellitus. Hypoglycemia symptoms include confusion. Pertinent negatives for hypoglycemia include no dizziness, headaches, nervousness/anxiousness or pallor. Associated symptoms include fatigue. Pertinent negatives for diabetes include no blurred vision, no chest pain, no polydipsia, no polyphagia, no weakness and no weight loss. Pertinent negatives for diabetic complications include no CVA, PVD or retinopathy. Current diabetic treatment includes oral agent (dual therapy) (Trulicity). He is compliant with treatment some of the time. Meal planning includes avoidance of concentrated sweets. He " never participates in exercise. His home blood glucose trend is decreasing steadily. His breakfast blood glucose range is generally 130-140 mg/dl. An ACE inhibitor/angiotensin II receptor blocker is not being taken (cough).   Hyperlipidemia   This is a chronic problem. The current episode started more than 1 year ago. The problem is controlled. Exacerbating diseases include diabetes and obesity. He has no history of hypothyroidism or liver disease. Pertinent negatives include no chest pain or shortness of breath. Current antihyperlipidemic treatment includes statins. The current treatment provides moderate improvement of lipids. Compliance problems include adherence to exercise.  Risk factors for coronary artery disease include hypertension, diabetes mellitus, a sedentary lifestyle, male sex and dyslipidemia.       Past Medical History:   Diagnosis Date    Allergy     pcn    Diabetes mellitus     Diabetes mellitus, type 2     Hyperlipidemia     Hypertension      Social History     Socioeconomic History    Marital status:      Spouse name: Not on file    Number of children: Not on file    Years of education: Not on file    Highest education level: Not on file   Social Needs    Financial resource strain: Not on file    Food insecurity - worry: Not on file    Food insecurity - inability: Not on file    Transportation needs - medical: Not on file    Transportation needs - non-medical: Not on file   Occupational History    Occupation:    Tobacco Use    Smoking status: Never Smoker    Smokeless tobacco: Never Used   Substance and Sexual Activity    Alcohol use: Yes    Drug use: No    Sexual activity: Yes     Partners: Female   Other Topics Concern    Not on file   Social History Narrative    Not on file     Past Surgical History:   Procedure Laterality Date    APPENDECTOMY      SPINE SURGERY       Family History   Problem Relation Age of Onset    Heart disease Father   "      Review of Systems   Constitutional: Positive for fatigue. Negative for activity change, appetite change, unexpected weight change (lost 2 lbs) and weight loss.   HENT: Negative for congestion, sneezing and trouble swallowing.    Eyes: Negative for blurred vision, pain and visual disturbance.   Respiratory: Negative for cough, chest tightness and shortness of breath.    Cardiovascular: Negative for chest pain, palpitations and leg swelling.        Borderline hypertension.   Gastrointestinal: Negative for abdominal distention and abdominal pain.   Endocrine: Negative for cold intolerance, heat intolerance, polydipsia and polyphagia.        Diabetes mellitus.   Genitourinary: Negative for dysuria.   Musculoskeletal: Positive for arthralgias (hand pains). Negative for back pain and gait problem.        Patient is status post right-sided hip arthroplasty and is recovering gradually with acceptable range of motion and quality of life.   Skin: Negative for pallor, rash and wound.        Extensive psoriasis on the skin especially in the trunk and back.   Neurological: Negative for dizziness, weakness and headaches.   Psychiatric/Behavioral: Positive for confusion. Negative for agitation and behavioral problems. The patient is not nervous/anxious.        Objective:       Vitals:    10/26/18 0824   BP: (!) 142/76   Pulse: 83   Weight: 92.5 kg (204 lb)   Height: 5' 8" (1.727 m)     Physical Exam   Constitutional: He appears well-developed and well-nourished. No distress.   HENT:   Head: Normocephalic and atraumatic.   Eyes: Right eye exhibits no discharge. Left eye exhibits no discharge.   Neck: No tracheal deviation present. No thyromegaly present.   Cardiovascular: Normal rate, regular rhythm and normal heart sounds.   Pulmonary/Chest: Breath sounds normal.   Abdominal: Soft. Bowel sounds are normal. There is no rebound.   Lymphadenopathy:     He has no cervical adenopathy.   Skin: Skin is dry. Rash (psoriasis) noted. "   Extensive psoriasis noted on the skin in the trunk and back.   Psychiatric: He has a normal mood and affect.       Component      Latest Ref Rng & Units 7/21/2018    216 (H)    12   Creatinine      0.76 - 1.27 mg/dL 1.21   eGFR if non African American      >59 mL/min/1.73 63   eGFR if       >59 mL/min/1.73 73   BUN/Creatinine Ratio      10 - 24 10   Sodium      134 - 144 mmol/L 136   Potassium      3.5 - 5.2 mmol/L 4.7   Chloride      96 - 106 mmol/L 96   CO2      20 - 29 mmol/L 24   Calcium      8.6 - 10.2 mg/dL 9.6   Cholesterol      100 - 199 mg/dL 131   Triglycerides      0 - 149 mg/dL 73   HDL      >39 mg/dL 45   VLDL Cholesterol Dinesh      5 - 40 mg/dL 15   LDL Calculated      0 - 99 mg/dL 71   ALT      0 - 44 IU/L 19     Assessment:       1. Benign essential hypertension    2. Multiple-type hyperlipidemia    3. Type 2 diabetes mellitus without complication, without long-term current use of insulin    4. Influenza vaccine administered         7.2   8.0CM   6.1CM   6.5CM     Lipid panel   Order: [id number]   Status:  Final result   Visible to patient:  Yes (Patient Portal)   Next appt:  None   Dx:  Multiple-type hyperlipidemia    Ref Range & Units 6d ago   Cholesterol 100 - 199 mg/dL 131    Triglycerides 0 - 149 mg/dL 73    HDL >39 mg/dL 45    VLDL Cholesterol Dinesh 5 - 40 mg/dL 15    LDL Calculated 0 - 99 mg/dL 71    Resulting Agency  [facility]               Plan:           Benign essential hypertension    Multiple-type hyperlipidemia    Type 2 diabetes mellitus without complication, without long-term current use of insulin    Influenza vaccine administered  -     Influenza - Quadrivalent (3 years & older) (PF)      I'm increasing the amlodipine to 10 mg per day and increasing the glipizide to 5 mg per day. He will continue with metformin and injection Trulicity as before. He cannot tolerate losartan because of cough.    As far as arthritis in the hand is concerned, I'm worried about psoriatic  arthritis. When he is ready to be treated for psoriasis-I believe his arthritis might also be benefited.    In the interim he can try measures like Osteo Bi-Flex, apple cider vinegar/turmeric as natural supplements. Increase fluid intake. Cut down on butter and greasy food. More greens and vegetables. Keep hydrated.    Advised Mr. Torre to monitor Blood sugars at home and record them.  Exercise, watch diet and loose weight.  keep a close eye on feet and keep them clean. Annual eye examination. Annual influenza vaccine.  Monitor HgbA1c every 3 to 6 months. Monitor urine microalbumin every year.keep LDL less than 100. Monitor blood pressure and target blood pressure 120/70.    The patient is asked to make an attempt to improve diet and exercise patterns to aid in medical management of this problem.

## 2018-10-26 NOTE — PATIENT INSTRUCTIONS
Using a Blood Sugar Log    You have diabetes. This means your body has trouble regulating a sugar called glucose. To help manage your diabetes, youll need to check your blood sugar level as directed by your healthcare provider. Keeping a log of your blood sugar levels will help you track your blood sugar readings. Its a simple and easy way to see how well you are controlling your diabetes.  Checking your blood sugar level  You can check your blood sugar level with a blood glucose meter. Youll first prick the side of your finger with a tiny lancet to draw a tiny drop of blood onto the test strip. Some glucose meters let you use another place on your body to test. But these other places should not be used in some cases as they may be inaccurate. Follow the instructions for your glucose meter. And talk with your healthcare provider before doing the test on other places.  The strip goes into the meter first, then a drop of blood is placed on the tip of the strip. The meter then shows a reading that tells you the level of your blood sugar. Your readings should be in your target range as often as possible. This means not too high or too low. Staying in this range helps lower your risk for complications. Your healthcare provider will help you figure out the target range that is best for you.  Tracking your readings  Every time you check your blood sugar, use your log to keep track of your readings. Your meter will also probably have a memory feature that your healthcare provider can check at your next visit. You may be advised by your healthcare provider to check your blood sugar in the morning, at bedtime, and before and after meals. Be sure to write down all of your numbers. Also use your log to record things that might have affected your blood sugar. Some examples include being sick, certain medicines, being physically active, feeling stressed, or skipping meals.   Lessons learned from your readings  Tracking your  blood sugar readings helps you see patterns. These patterns tell you how your actions affect your blood sugar. For instance, you may have higher numbers after eating certain foods or lower numbers after exercise. They just help you understand how to stay in your target range more often, so that your diabetes remains in good control.  Sharing your log with your healthcare team  Bring your blood sugar log and glucose meter with you to all of your healthcare appointments. This can help your healthcare team make changes to your treatment plan, if needed. This may involve making changes in what you eat, what medicines you take, or how much you exercise.  To learn more  The resources below can help you learn more:  · American Diabetes Association 161-420-4814 www.diabetes.org  · [employer] 212-485-7157 www.lighthouse.org  · [employer] [place] 090-462-0581 www.nei.nih.gov  · [facility] 111-763-2476 www.hormone.org  Date Last Reviewed: 5/1/2016 © 2000-2017 [employer]. [address] [place] [address]. All rights reserved. This information is not intended as a substitute for professional medical care. Always follow your healthcare professional's instructions.        Medicine for Cholesterol Control  Cholesterol is a waxy substance in your bloodstream. If there is too much of it in your blood, it can build up in the walls of your arteries. Over time, this buildup can lead to coronary disease. Coronary disease can put you at risk for a heart attack or stroke. It can also put you at risk for disease of the arteries in your legs and other places in your body. Medicine can give you the extra help you need to control your cholesterol.    How medicine helps  Different kinds of medicines help with cholesterol levels. Some help lower your LDL (bad cholesterol). Some help raise your HDL (good cholesterol). Other medicines lower your triglyceride levels. And some do all three. It  may take some time to find the right medicine for you. Taking medicine will be only one part of your cholesterol control plan. You will still need to eat right and get regular exercise.  Talk with your healthcare provider to find out your risks for having a heart attack. Your provider can tell you what goals to use to see if your treatment is working. These goals may vary based on your health issues or family history. Also ask your provider how often your cholesterol should be checked as part of your treatment plan. You may need to fast before getting your cholesterol checked.  Taking your medicine  It is important to:  · Tell your healthcare provider about any other medicines you take. This includes over-the-counter medicines. It also includes vitamins and herbs.  · Take your medicine exactly as directed. This helps make sure that it works as it should.  · Don't skip a dose.  · Don't stop taking it if you feel better.  · Don't stop taking it when your cholesterol numbers improve.  · Order your refill before your medicine runs out.  Side effects  Medicines can cause side effects. These often occur at the start of taking a new medicine. Side effects can include headache and upset stomach. Rarely you can have muscle aches. Tell your healthcare provider about any side effects you have.  When to call your healthcare provider  When taking your medicine, let your healthcare provider know if you have:  · Yellowing of the whites of eyes  · Blurred vision  · Muscle aches  · Trouble breathing   High-risk groups  Some people may need to take medicines called statins to control their cholesterol. This is in addition to eating a healthy diet and getting regular exercise.  Statins can help you stay healthy. They can also help prevent a heart attack or stroke. You may need to take a statin if you are in one of these groups:  · Adults who have had a heart attack or stroke. Or adults who have had peripheral vascular disease, a  ministroke (transient ischemic attack), or stable or unstable angina. This group also includes people who have had a procedure to restore blood flow through a blocked artery. These procedures include percutaneous coronary intervention, angioplasty, stent, and open-heart bypass surgery.  · Adults who have diabetes. Or adults who are at higher risk of having a heart attack or stroke and have an LDL cholesterol level of 70 to 189 mg/dL  · Adults who are 21 years old or older and have an LDL cholesterol level of 190 mg/dL or higher.  If you are in a high-risk group, talk with your healthcare provider about your treatment goals. Make sure you understand why these goals are important, based on your own health history and your family history of heart disease or high cholesterol.  Make a plan to have regular cholesterol checks. You may need to fast before getting this test. Also ask your provider about any side effects your medicines may cause. Let your provider know about any side effects you have. You may need to take more than one medicine to reach the cholesterol goals that you and your provider decide on.  Date Last Reviewed: 10/1/2016  © 7796-1753 [employer]. [address] [place] [address], [place] [address]. All rights reserved. This information is not intended as a substitute for professional medical care. Always follow your healthcare professional's instructions.

## 2019-01-11 RX ORDER — METFORMIN HYDROCHLORIDE 500 MG/1
TABLET ORAL
Qty: 180 TABLET | Refills: 4 | Status: SHIPPED | OUTPATIENT
Start: 2019-01-11 | End: 2019-08-29 | Stop reason: SDUPTHER

## 2019-01-20 LAB
ALBUMIN SERPL-MCNC: 4.5 G/DL (ref 3.6–4.8)
ALBUMIN/GLOB SERPL: 2 {RATIO} (ref 1.2–2.2)
ALP SERPL-CCNC: 93 IU/L (ref 39–117)
ALT SERPL-CCNC: 25 IU/L (ref 0–44)
AST SERPL-CCNC: 19 IU/L (ref 0–40)
BILIRUB SERPL-MCNC: 0.5 MG/DL (ref 0–1.2)
BUN SERPL-MCNC: 15 MG/DL (ref 8–27)
BUN/CREAT SERPL: 12 (ref 10–24)
CALCIUM SERPL-MCNC: 10.1 MG/DL (ref 8.6–10.2)
CHLORIDE SERPL-SCNC: 96 MMOL/L (ref 96–106)
CHOLEST SERPL-MCNC: 143 MG/DL (ref 100–199)
CO2 SERPL-SCNC: 23 MMOL/L (ref 20–29)
CREAT SERPL-MCNC: 1.21 MG/DL (ref 0.76–1.27)
EGFR IF AFRICAN AMERICAN: 73 ML/MIN/1.73
EST. GFR  (NON AFRICAN AMERICAN): 63 ML/MIN/1.73
GLOBULIN SER CALC-MCNC: 2.3 G/DL (ref 1.5–4.5)
GLUCOSE SERPL-MCNC: 269 MG/DL (ref 65–99)
HBA1C MFR BLD: 9.9 % (ref 4.8–5.6)
HDLC SERPL-MCNC: 40 MG/DL
LDLC SERPL CALC-MCNC: 76 MG/DL (ref 0–99)
POTASSIUM SERPL-SCNC: 5.9 MMOL/L (ref 3.5–5.2)
PROT SERPL-MCNC: 6.8 G/DL (ref 6–8.5)
SODIUM SERPL-SCNC: 138 MMOL/L (ref 134–144)
TRIGL SERPL-MCNC: 135 MG/DL (ref 0–149)
VLDLC SERPL CALC-MCNC: 27 MG/DL (ref 5–40)

## 2019-01-21 DIAGNOSIS — E78.2 MULTIPLE-TYPE HYPERLIPIDEMIA: Primary | ICD-10-CM

## 2019-01-21 RX ORDER — ATORVASTATIN CALCIUM 40 MG/1
40 TABLET, FILM COATED ORAL DAILY
Qty: 90 TABLET | Refills: 3 | Status: SHIPPED | OUTPATIENT
Start: 2019-01-21 | End: 2019-01-22 | Stop reason: SDUPTHER

## 2019-01-22 ENCOUNTER — TELEPHONE (OUTPATIENT)
Dept: FAMILY MEDICINE | Facility: CLINIC | Age: 64
End: 2019-01-22

## 2019-01-22 DIAGNOSIS — E78.2 MULTIPLE-TYPE HYPERLIPIDEMIA: ICD-10-CM

## 2019-01-22 RX ORDER — ATORVASTATIN CALCIUM 40 MG/1
40 TABLET, FILM COATED ORAL DAILY
Qty: 90 TABLET | Refills: 3 | Status: SHIPPED | OUTPATIENT
Start: 2019-01-22 | End: 2020-01-31

## 2019-01-25 ENCOUNTER — OFFICE VISIT (OUTPATIENT)
Dept: FAMILY MEDICINE | Facility: CLINIC | Age: 64
End: 2019-01-25
Payer: COMMERCIAL

## 2019-01-25 VITALS
HEART RATE: 97 BPM | SYSTOLIC BLOOD PRESSURE: 122 MMHG | WEIGHT: 203 LBS | DIASTOLIC BLOOD PRESSURE: 68 MMHG | BODY MASS INDEX: 30.77 KG/M2 | HEIGHT: 68 IN

## 2019-01-25 DIAGNOSIS — E11.9 TYPE 2 DIABETES MELLITUS WITHOUT COMPLICATION, WITHOUT LONG-TERM CURRENT USE OF INSULIN: Primary | ICD-10-CM

## 2019-01-25 DIAGNOSIS — E11.9 WELL CONTROLLED DIABETES MELLITUS: ICD-10-CM

## 2019-01-25 DIAGNOSIS — I10 BENIGN ESSENTIAL HYPERTENSION: ICD-10-CM

## 2019-01-25 DIAGNOSIS — Z12.11 SCREENING FOR COLON CANCER: ICD-10-CM

## 2019-01-25 DIAGNOSIS — E87.5 HYPERKALEMIA: ICD-10-CM

## 2019-01-25 DIAGNOSIS — E78.2 MULTIPLE-TYPE HYPERLIPIDEMIA: ICD-10-CM

## 2019-01-25 PROCEDURE — 3008F BODY MASS INDEX DOCD: CPT | Mod: ,,, | Performed by: INTERNAL MEDICINE

## 2019-01-25 PROCEDURE — 3078F PR MOST RECENT DIASTOLIC BLOOD PRESSURE < 80 MM HG: ICD-10-PCS | Mod: ,,, | Performed by: INTERNAL MEDICINE

## 2019-01-25 PROCEDURE — 3046F PR MOST RECENT HEMOGLOBIN A1C LEVEL > 9.0%: ICD-10-PCS | Mod: ,,, | Performed by: INTERNAL MEDICINE

## 2019-01-25 PROCEDURE — 3074F SYST BP LT 130 MM HG: CPT | Mod: ,,, | Performed by: INTERNAL MEDICINE

## 2019-01-25 PROCEDURE — 3078F DIAST BP <80 MM HG: CPT | Mod: ,,, | Performed by: INTERNAL MEDICINE

## 2019-01-25 PROCEDURE — 99214 PR OFFICE/OUTPT VISIT, EST, LEVL IV, 30-39 MIN: ICD-10-PCS | Mod: ,,, | Performed by: INTERNAL MEDICINE

## 2019-01-25 PROCEDURE — 3008F PR BODY MASS INDEX (BMI) DOCUMENTED: ICD-10-PCS | Mod: ,,, | Performed by: INTERNAL MEDICINE

## 2019-01-25 PROCEDURE — 99214 OFFICE O/P EST MOD 30 MIN: CPT | Mod: ,,, | Performed by: INTERNAL MEDICINE

## 2019-01-25 PROCEDURE — 3074F PR MOST RECENT SYSTOLIC BLOOD PRESSURE < 130 MM HG: ICD-10-PCS | Mod: ,,, | Performed by: INTERNAL MEDICINE

## 2019-01-25 PROCEDURE — 3046F HEMOGLOBIN A1C LEVEL >9.0%: CPT | Mod: ,,, | Performed by: INTERNAL MEDICINE

## 2019-01-25 RX ORDER — GLIPIZIDE 5 MG/1
5 TABLET, FILM COATED, EXTENDED RELEASE ORAL 2 TIMES DAILY
Qty: 180 TABLET | Refills: 3 | Status: SHIPPED | OUTPATIENT
Start: 2019-01-25 | End: 2020-02-27

## 2019-01-25 NOTE — PROGRESS NOTES
Subjective:       Patient ID: Mg Torre is a 63 y.o. male.    Chief Complaint: Diabetes (lab review ); Hypertension; and Hyperlipidemia    Mr. Mg Torre is a 63-year-old  male who comes for follow-up. Underlying medical issues of type 2 diabetes mellitus, hypertension, dyslipidemia been noted.    Off late his sugars have been escalating and he admits that he has been somewhat partial reports eating dates over the last several months. He is also noticed that his blood sugars have escalated.    In past his potassium levels were elevated and ACE inhibitor/ARB. However at this point his potassium showed a rise again to 5.9.    No chest pains, shortness of breath or headaches. He does have psoriasis on the skin for which no specific treatment has been soft.    Social situation indicates that he is a  and plans to continue driving till some one stops him.      Diabetes   He presents for his follow-up diabetic visit. He has type 2 diabetes mellitus. His disease course has been worsening. Hypoglycemia symptoms include confusion. Pertinent negatives for hypoglycemia include no dizziness, headaches, hunger, mood changes, nervousness/anxiousness, pallor, seizures, sleepiness, speech difficulty or sweats. Associated symptoms include fatigue. Pertinent negatives for diabetes include no chest pain, no foot ulcerations, no polydipsia, no polyphagia, no weakness and no weight loss. There are no hypoglycemic complications. Symptoms are worsening. Risk factors for coronary artery disease include hypertension, male sex, sedentary lifestyle, dyslipidemia and diabetes mellitus. Current diabetic treatment includes oral agent (dual therapy). He is compliant with treatment some of the time. His weight is stable. Diabetic current diet: EATING DATES. Meal planning includes avoidance of concentrated sweets. His home blood glucose trend is increasing steadily. His breakfast blood glucose range is generally  180-200 mg/dl. An ACE inhibitor/angiotensin II receptor blocker is not being taken (Hyperkalemia). He does not see a podiatrist.Eye exam is not current.   Hypertension   This is a chronic problem. The current episode started more than 1 year ago. The problem is unchanged. The problem is controlled. Pertinent negatives include no chest pain, headaches, malaise/fatigue, neck pain, palpitations, peripheral edema, PND, shortness of breath or sweats. Past treatments include ACE inhibitors (ACE stopped hyperkalemia). The current treatment provides moderate improvement. There is no history of coarctation of the aorta, hyperaldosteronism, pheochromocytoma or renovascular disease.   Hyperlipidemia   This is a chronic problem. The current episode started more than 1 year ago. Exacerbating diseases include obesity. Pertinent negatives include no chest pain or shortness of breath. Risk factors for coronary artery disease include a sedentary lifestyle, hypertension, diabetes mellitus, dyslipidemia, male sex and obesity.       Past Medical History:   Diagnosis Date    Allergy     pcn    Diabetes mellitus     Diabetes mellitus, type 2     Hyperlipidemia     Hypertension      Social History     Socioeconomic History    Marital status:      Spouse name: Not on file    Number of children: Not on file    Years of education: Not on file    Highest education level: Not on file   Social Needs    Financial resource strain: Not on file    Food insecurity - worry: Not on file    Food insecurity - inability: Not on file    Transportation needs - medical: Not on file    Transportation needs - non-medical: Not on file   Occupational History    Occupation:    Tobacco Use    Smoking status: Never Smoker    Smokeless tobacco: Never Used   Substance and Sexual Activity    Alcohol use: Yes    Drug use: No    Sexual activity: Yes     Partners: Female   Other Topics Concern    Not on file   Social History  "Narrative    Not on file     Past Surgical History:   Procedure Laterality Date    APPENDECTOMY      SPINE SURGERY       Family History   Problem Relation Age of Onset    Heart disease Father        Review of Systems   Constitutional: Positive for fatigue. Negative for activity change, appetite change, malaise/fatigue, unexpected weight change (lost 2 lbs) and weight loss.   HENT: Negative for congestion, sneezing and trouble swallowing.    Eyes: Negative for pain and visual disturbance.   Respiratory: Negative for cough, chest tightness and shortness of breath.    Cardiovascular: Negative for chest pain, palpitations, leg swelling and PND.        Borderline hypertension.   Gastrointestinal: Negative for abdominal distention and abdominal pain.   Endocrine: Negative for cold intolerance, heat intolerance, polydipsia and polyphagia.        Diabetes mellitus.   Genitourinary: Negative for dysuria.   Musculoskeletal: Positive for arthralgias (hand pains). Negative for back pain, gait problem and neck pain.        Patient is status post right-sided hip arthroplasty and is recovering gradually with acceptable range of motion and quality of life.   Skin: Negative for pallor, rash and wound.        Extensive psoriasis on the skin especially in the trunk and back.   Neurological: Negative for dizziness, seizures, speech difficulty, weakness and headaches.   Psychiatric/Behavioral: Positive for confusion. Negative for agitation and behavioral problems. The patient is not nervous/anxious.          Objective:      Blood pressure 122/68, pulse 97, height 5' 8" (1.727 m), weight 92.1 kg (203 lb). Body mass index is 30.87 kg/m².  Physical Exam   Constitutional: He appears well-developed and well-nourished. No distress.   HENT:   Head: Normocephalic and atraumatic.   Eyes: Right eye exhibits no discharge. Left eye exhibits no discharge.   Neck: No tracheal deviation present. No thyromegaly present.   Cardiovascular: Normal " rate, regular rhythm and normal heart sounds.   Pulmonary/Chest: Breath sounds normal.   Abdominal: Soft. Bowel sounds are normal. There is no rebound.   Lymphadenopathy:     He has no cervical adenopathy.   Skin: Skin is dry. Rash (psoriasis) noted.   Extensive psoriasis noted on the skin in the trunk and back.   Psychiatric: He has a normal mood and affect.         Assessment:       1. Type 2 diabetes mellitus without complication, without long-term current use of insulin    2. Benign essential hypertension    3. Multiple-type hyperlipidemia    4. Hyperkalemia    5. Well controlled diabetes mellitus    6. Screening for colon cancer           Office Visit on 10/26/2018   Component Date Value Ref Range Status    Hemoglobin A1C 01/19/2019 9.9* 4.8 - 5.6 % Final    Cholesterol 01/19/2019 143  100 - 199 mg/dL Final    Triglycerides 01/19/2019 135  0 - 149 mg/dL Final    HDL 01/19/2019 40  >39 mg/dL Final    VLDL Cholesterol Dinesh 01/19/2019 27  5 - 40 mg/dL Final    LDL Calculated 01/19/2019 76  0 - 99 mg/dL Final    Glucose 01/19/2019 269* 65 - 99 mg/dL Final    BUN, Bld 01/19/2019 15  8 - 27 mg/dL Final    Creatinine 01/19/2019 1.21  0.76 - 1.27 mg/dL Final    eGFR if non African American 01/19/2019 63  >59 mL/min/1.73 Final    eGFR if  01/19/2019 73  >59 mL/min/1.73 Final    BUN/Creatinine Ratio 01/19/2019 12  10 - 24 Final    Sodium 01/19/2019 138  134 - 144 mmol/L Final    Potassium 01/19/2019 5.9* 3.5 - 5.2 mmol/L Final    Chloride 01/19/2019 96  96 - 106 mmol/L Final    CO2 01/19/2019 23  20 - 29 mmol/L Final    Calcium 01/19/2019 10.1  8.6 - 10.2 mg/dL Final    Total Protein 01/19/2019 6.8  6.0 - 8.5 g/dL Final    Albumin 01/19/2019 4.5  3.6 - 4.8 g/dL Final    Globulin, Total 01/19/2019 2.3  1.5 - 4.5 g/dL Final    Albumin/Globulin Ratio 01/19/2019 2.0  1.2 - 2.2 Final    Total Bilirubin 01/19/2019 0.5  0.0 - 1.2 mg/dL Final    Alkaline Phosphatase 01/19/2019 93  39 - 117  IU/L Final    AST 01/19/2019 19  0 - 40 IU/L Final    ALT 01/19/2019 25  0 - 44 IU/L Final         Plan:           Type 2 diabetes mellitus without complication, without long-term current use of insulin  -     Ambulatory referral to Ophthalmology  -     glipiZIDE (GLUCOTROL) 5 MG TR24; Take 1 tablet (5 mg total) by mouth 2 (two) times daily.  Dispense: 180 tablet; Refill: 3  -     Hemoglobin A1c; Future; Expected date: 04/24/2019    Benign essential hypertension    Multiple-type hyperlipidemia    Hyperkalemia  -     Basic metabolic panel; Future; Expected date: 01/25/2019    Well controlled diabetes mellitus    Screening for colon cancer  -     Ambulatory referral to Gastroenterology      Will increase the glipizide to 5 mg twice a day.    Increase hydration with pain water  At this point I do not see any ACE inhibitors or ARB's.    I will repeat a basic metabolic panel in 1 week and see how he does.    If his potassium continues to be elevated, will make a nephrology referral.      Advised Mr. Torre to monitor Blood sugars at home and record them.  Exercise, watch diet and loose weight.  keep a close eye on feet and keep them clean. Annual eye examination. Annual influenza vaccine.  Monitor HgbA1c every 3 to 6 months. Monitor urine microalbumin every year.keep LDL less than 100. Monitor blood pressure and target blood pressure 120/70.        The patient is asked to make an attempt to improve diet and exercise patterns to aid in medical management of this problem.    Advised Mr. Torre about age and season appropriate immunizations/ cancer screenings.  Also seasonal influenza vaccine, update on tetanus diphtheria vaccination every 10 years.          Current Outpatient Medications:     amLODIPine (NORVASC) 10 MG tablet, Take 1 tablet (10 mg total) by mouth once daily., Disp: 90 tablet, Rfl: 2    aspirin 325 MG tablet, Take 325 mg by mouth 2 (two) times daily., Disp: , Rfl:     atorvastatin (LIPITOR) 40 MG  tablet, Take 1 tablet (40 mg total) by mouth once daily., Disp: 90 tablet, Rfl: 3    glipiZIDE (GLUCOTROL) 5 MG TR24, Take 1 tablet (5 mg total) by mouth 2 (two) times daily., Disp: 180 tablet, Rfl: 3    hydroCHLOROthiazide (HYDRODIURIL) 12.5 MG Tab, TAKE ONE TABLET BY MOUTH ONCE DAILY, Disp: 90 tablet, Rfl: 3    metFORMIN (GLUCOPHAGE) 500 MG tablet, TAKE 2 TABLETS BY MOUTH TWICE DAILY, Disp: 180 tablet, Rfl: 4    multivitamin (THERAGRAN) per tablet, Take 1 tablet by mouth Daily., Disp: , Rfl:     TRULICITY 1.5 mg/0.5 mL PnIj, INJECT ONE SYRINGE SUBCUTANEOUSLY EVERY 7 DAYS, Disp: 12 Syringe, Rfl: 3

## 2019-01-25 NOTE — PATIENT INSTRUCTIONS
Using a Blood Sugar Log    You have diabetes. This means your body has trouble regulating a sugar called glucose. To help manage your diabetes, youll need to check your blood sugar level as directed by your healthcare provider. Keeping a log of your blood sugar levels will help you track your blood sugar readings. Its a simple and easy way to see how well you are controlling your diabetes.  Checking your blood sugar level  You can check your blood sugar level with a blood glucose meter. Youll first prick the side of your finger with a tiny lancet to draw a tiny drop of blood onto the test strip. Some glucose meters let you use another place on your body to test. But these other places should not be used in some cases as they may be inaccurate. Follow the instructions for your glucose meter. And talk with your healthcare provider before doing the test on other places.  The strip goes into the meter first, then a drop of blood is placed on the tip of the strip. The meter then shows a reading that tells you the level of your blood sugar. Your readings should be in your target range as often as possible. This means not too high or too low. Staying in this range helps lower your risk for complications. Your healthcare provider will help you figure out the target range that is best for you.  Tracking your readings  Every time you check your blood sugar, use your log to keep track of your readings. Your meter will also probably have a memory feature that your healthcare provider can check at your next visit. You may be advised by your healthcare provider to check your blood sugar in the morning, at bedtime, and before and after meals. Be sure to write down all of your numbers. Also use your log to record things that might have affected your blood sugar. Some examples include being sick, certain medicines, being physically active, feeling stressed, or skipping meals.   Lessons learned from your readings  Tracking your  blood sugar readings helps you see patterns. These patterns tell you how your actions affect your blood sugar. For instance, you may have higher numbers after eating certain foods or lower numbers after exercise. They just help you understand how to stay in your target range more often, so that your diabetes remains in good control.  Sharing your log with your healthcare team  Bring your blood sugar log and glucose meter with you to all of your healthcare appointments. This can help your healthcare team make changes to your treatment plan, if needed. This may involve making changes in what you eat, what medicines you take, or how much you exercise.  To learn more  The resources below can help you learn more:  · American Diabetes Association 129-104-7056 www.diabetes.org  · [employer] 427-684-1933 www.lighthouse.org  · National [facility] [place] 338-542-0566 www.nei.nih.gov  · [facility] 659-244-1895 www.hormone.org  Date Last Reviewed: 5/1/2016 © 2000-2017 [employer]. [address] [place] [address]. All rights reserved. This information is not intended as a substitute for professional medical care. Always follow your healthcare professional's instructions.        Medicine for Cholesterol Control  Cholesterol is a waxy substance in your bloodstream. If there is too much of it in your blood, it can build up in the walls of your arteries. Over time, this buildup can lead to coronary disease. Coronary disease can put you at risk for a heart attack or stroke. It can also put you at risk for disease of the arteries in your legs and other places in your body. Medicine can give you the extra help you need to control your cholesterol.    How medicine helps  Different kinds of medicines help with cholesterol levels. Some help lower your LDL (bad cholesterol). Some help raise your HDL (good cholesterol). Other medicines lower your triglyceride levels. And some do all three. It  may take some time to find the right medicine for you. Taking medicine will be only one part of your cholesterol control plan. You will still need to eat right and get regular exercise.  Talk with your healthcare provider to find out your risks for having a heart attack. Your provider can tell you what goals to use to see if your treatment is working. These goals may vary based on your health issues or family history. Also ask your provider how often your cholesterol should be checked as part of your treatment plan. You may need to fast before getting your cholesterol checked.  Taking your medicine  It is important to:  · Tell your healthcare provider about any other medicines you take. This includes over-the-counter medicines. It also includes vitamins and herbs.  · Take your medicine exactly as directed. This helps make sure that it works as it should.  · Don't skip a dose.  · Don't stop taking it if you feel better.  · Don't stop taking it when your cholesterol numbers improve.  · Order your refill before your medicine runs out.  Side effects  Medicines can cause side effects. These often occur at the start of taking a new medicine. Side effects can include headache and upset stomach. Rarely you can have muscle aches. Tell your healthcare provider about any side effects you have.  When to call your healthcare provider  When taking your medicine, let your healthcare provider know if you have:  · Yellowing of the whites of eyes  · Blurred vision  · Muscle aches  · Trouble breathing   High-risk groups  Some people may need to take medicines called statins to control their cholesterol. This is in addition to eating a healthy diet and getting regular exercise.  Statins can help you stay healthy. They can also help prevent a heart attack or stroke. You may need to take a statin if you are in one of these groups:  · Adults who have had a heart attack or stroke. Or adults who have had peripheral vascular disease, a  ministroke (transient ischemic attack), or stable or unstable angina. This group also includes people who have had a procedure to restore blood flow through a blocked artery. These procedures include percutaneous coronary intervention, angioplasty, stent, and open-heart bypass surgery.  · Adults who have diabetes. Or adults who are at higher risk of having a heart attack or stroke and have an LDL cholesterol level of 70 to 189 mg/dL  · Adults who are 21 years old or older and have an LDL cholesterol level of 190 mg/dL or higher.  If you are in a high-risk group, talk with your healthcare provider about your treatment goals. Make sure you understand why these goals are important, based on your own health history and your family history of heart disease or high cholesterol.  Make a plan to have regular cholesterol checks. You may need to fast before getting this test. Also ask your provider about any side effects your medicines may cause. Let your provider know about any side effects you have. You may need to take more than one medicine to reach the cholesterol goals that you and your provider decide on.  Date Last Reviewed: 10/1/2016  © 0495-5387 [employer]. [address] [place] [address], [place] [address]. All rights reserved. This information is not intended as a substitute for professional medical care. Always follow your healthcare professional's instructions.

## 2019-03-03 LAB
BUN SERPL-MCNC: 12 MG/DL (ref 8–27)
BUN/CREAT SERPL: 12 (ref 10–24)
CALCIUM SERPL-MCNC: 10.1 MG/DL (ref 8.6–10.2)
CHLORIDE SERPL-SCNC: 97 MMOL/L (ref 96–106)
CO2 SERPL-SCNC: 24 MMOL/L (ref 20–29)
CREAT SERPL-MCNC: 0.99 MG/DL (ref 0.76–1.27)
GLUCOSE SERPL-MCNC: 202 MG/DL (ref 65–99)
HBA1C MFR BLD: 8.4 % (ref 4.8–5.6)
POTASSIUM SERPL-SCNC: 5.4 MMOL/L (ref 3.5–5.2)
SODIUM SERPL-SCNC: 138 MMOL/L (ref 134–144)

## 2019-03-04 ENCOUNTER — TELEPHONE (OUTPATIENT)
Dept: FAMILY MEDICINE | Facility: CLINIC | Age: 64
End: 2019-03-04

## 2019-03-04 NOTE — TELEPHONE ENCOUNTER
Patient's potassium is still a little high. It is definitely better than before. Hemoglobin A1c is also better. I would like to see him probably by in a few weeks again instead of the month of May.

## 2019-03-15 ENCOUNTER — OFFICE VISIT (OUTPATIENT)
Dept: FAMILY MEDICINE | Facility: CLINIC | Age: 64
End: 2019-03-15
Payer: COMMERCIAL

## 2019-03-15 VITALS
HEART RATE: 85 BPM | DIASTOLIC BLOOD PRESSURE: 72 MMHG | WEIGHT: 200 LBS | HEIGHT: 68 IN | BODY MASS INDEX: 30.31 KG/M2 | SYSTOLIC BLOOD PRESSURE: 116 MMHG

## 2019-03-15 DIAGNOSIS — I10 BENIGN ESSENTIAL HYPERTENSION: ICD-10-CM

## 2019-03-15 DIAGNOSIS — E87.5 HYPERKALEMIA: ICD-10-CM

## 2019-03-15 DIAGNOSIS — E11.9 TYPE 2 DIABETES MELLITUS WITHOUT COMPLICATION, WITHOUT LONG-TERM CURRENT USE OF INSULIN: Primary | ICD-10-CM

## 2019-03-15 DIAGNOSIS — E78.2 MULTIPLE-TYPE HYPERLIPIDEMIA: ICD-10-CM

## 2019-03-15 PROCEDURE — 3045F PR MOST RECENT HEMOGLOBIN A1C LEVEL 7.0-9.0%: ICD-10-PCS | Mod: ,,, | Performed by: INTERNAL MEDICINE

## 2019-03-15 PROCEDURE — 99213 OFFICE O/P EST LOW 20 MIN: CPT | Mod: ,,, | Performed by: INTERNAL MEDICINE

## 2019-03-15 PROCEDURE — 3078F DIAST BP <80 MM HG: CPT | Mod: ,,, | Performed by: INTERNAL MEDICINE

## 2019-03-15 PROCEDURE — 3008F BODY MASS INDEX DOCD: CPT | Mod: ,,, | Performed by: INTERNAL MEDICINE

## 2019-03-15 PROCEDURE — 3074F SYST BP LT 130 MM HG: CPT | Mod: ,,, | Performed by: INTERNAL MEDICINE

## 2019-03-15 PROCEDURE — 3008F PR BODY MASS INDEX (BMI) DOCUMENTED: ICD-10-PCS | Mod: ,,, | Performed by: INTERNAL MEDICINE

## 2019-03-15 PROCEDURE — 99213 PR OFFICE/OUTPT VISIT, EST, LEVL III, 20-29 MIN: ICD-10-PCS | Mod: ,,, | Performed by: INTERNAL MEDICINE

## 2019-03-15 PROCEDURE — 3045F PR MOST RECENT HEMOGLOBIN A1C LEVEL 7.0-9.0%: CPT | Mod: ,,, | Performed by: INTERNAL MEDICINE

## 2019-03-15 PROCEDURE — 3074F PR MOST RECENT SYSTOLIC BLOOD PRESSURE < 130 MM HG: ICD-10-PCS | Mod: ,,, | Performed by: INTERNAL MEDICINE

## 2019-03-15 PROCEDURE — 3078F PR MOST RECENT DIASTOLIC BLOOD PRESSURE < 80 MM HG: ICD-10-PCS | Mod: ,,, | Performed by: INTERNAL MEDICINE

## 2019-03-15 RX ORDER — GUAIFENESIN AND PHENYLEPHRINE HCL 400; 10 MG/1; MG/1
1 TABLET ORAL 2 TIMES DAILY
COMMUNITY

## 2019-03-15 NOTE — PATIENT INSTRUCTIONS
Using a Blood Sugar Log    You have diabetes. This means your body has trouble regulating a sugar called glucose. To help manage your diabetes, youll need to check your blood sugar level as directed by your healthcare provider. Keeping a log of your blood sugar levels will help you track your blood sugar readings. Its a simple and easy way to see how well you are controlling your diabetes.  Checking your blood sugar level  You can check your blood sugar level with a blood glucose meter. Youll first prick the side of your finger with a tiny lancet to draw a tiny drop of blood onto the test strip. Some glucose meters let you use another place on your body to test. But these other places should not be used in some cases as they may be inaccurate. Follow the instructions for your glucose meter. And talk with your healthcare provider before doing the test on other places.  The strip goes into the meter first, then a drop of blood is placed on the tip of the strip. The meter then shows a reading that tells you the level of your blood sugar. Your readings should be in your target range as often as possible. This means not too high or too low. Staying in this range helps lower your risk for complications. Your healthcare provider will help you figure out the target range that is best for you.  Tracking your readings  Every time you check your blood sugar, use your log to keep track of your readings. Your meter will also probably have a memory feature that your healthcare provider can check at your next visit. You may be advised by your healthcare provider to check your blood sugar in the morning, at bedtime, and before and after meals. Be sure to write down all of your numbers. Also use your log to record things that might have affected your blood sugar. Some examples include being sick, certain medicines, being physically active, feeling stressed, or skipping meals.   Lessons learned from your readings  Tracking your  blood sugar readings helps you see patterns. These patterns tell you how your actions affect your blood sugar. For instance, you may have higher numbers after eating certain foods or lower numbers after exercise. They just help you understand how to stay in your target range more often, so that your diabetes remains in good control.  Sharing your log with your healthcare team  Bring your blood sugar log and glucose meter with you to all of your healthcare appointments. This can help your healthcare team make changes to your treatment plan, if needed. This may involve making changes in what you eat, what medicines you take, or how much you exercise.  To learn more  The resources below can help you learn more:  · American Diabetes Association 687-795-5719 www.diabetes.org  · [employer] 503-494-4046 www.lighthouse.org  · National [facility] [place] 960-997-2526 www.nei.nih.gov  · [facility] 552-741-1552 www.hormone.org  Date Last Reviewed: 5/1/2016  © 2603-1317 [employer]. [address] [place] [address]. All rights reserved. This information is not intended as a substitute for professional medical care. Always follow your healthcare professional's instructions.        Exercise to Manage Your Blood Sugar    Being physically active every day can help you manage your blood sugar. Thats because an active lifestyle can improve your bodys ability to use insulin. Daily activity can also help delay or prevent complications of diabetes. And its a great way to relieve stress. If you arent normally active, be sure to consult your healthcare provider before getting started.  How much activity do you need?  If daily activity is new to you, start slow and steady. Begin with 10 minutes of activity each day. Then work up to at least 150 minutes a week of physical activity. Don't let more than 2 days go by without being active. When sitting for long periods of time, get up for short  "sessions of light activity every 30 minutes  Just move!  You dont have to join a gym or own pricey sports equipment. Just get out and walk. Walking is an aerobic exercise that makes your heart and lungs work hard. It helps your heart and blood vessels. Walking needs only a sturdy pair of sneakers and your own two feet. The more you walk, the easier it gets:  · Schedule time every day to move your feet.  · Make it part of your daily routine.  · Walk with a friend or a group to keep it interesting and fun.  · Try taking several short walks during the day to meet your daily activity goal.  A pedometer makes every step count  A pedometer is a small device that keeps track of how many steps you take. You can clip it to your belt (or a strap on your arm or leg) and go about your daily routine. "Smartphones" now also have apps to record your walking. At the end of the day, the pedometer shows the total number of steps you took. Use a pedometer to set daily goals for yourself. For instance, if you walk 4,000 steps a day, try adding 200 more steps each day. Aim for a goal of 7,500. With every step, youre doing a little more to help your body use insulin.   Adding resistance exercise  Resistance exercise (also called strength training), makes muscles stronger. It also helps muscles use insulin better. Ask your healthcare provider whether this type of exercise is right for you. If it is, your healthcare provider can help you work it in to your activity plan.  Staying safe  Being active may cause blood sugar to drop faster than usual. This is especially true if you take medicine to manage your blood sugar. But there are things you can do to help reduce the risk of accidental lows. Keep these tips in mind:  · Always carry identification when you exercise outside your home. Carry a cell phone to use in case of emergency.  · If you can, include friends and family in your activities.  · Wear a medical ID bracelet that says you " have diabetes.  · Use the right safety equipment for the activity you do (such as a bicycle helmet when you ride a bicycle outdoors). Wear closed-toed shoes that fit your feet well.  · Drink plenty of water before and during activity.  · Keep a fast-acting sugar (such as glucose tablets) on hand in case of low blood sugar.  · Dress properly for the weather. Wear a hat if its sri, or wait until evening if its too hot.  · Avoid being active for long periods in very hot or very cold weather.  · Skip activity if youre sick.     Notice how activity affects blood sugar  Physical activity is important when you have diabetes. But you need to keep an eye on your blood sugar level. Check often if you have been active for longer than usual, or if the activity was unplanned. Make it a habit to check your blood sugar before being active. And check again a few hours later. Use your log book to write down how activity affects your numbers. If you take insulin, you may be able to adjust your dose before a planned activity. This can help prevent lows. You may also need to take a small carbohydrate snack before the exercise. Talk to your healthcare provider to learn more.    Date Last Reviewed: 6/1/2016  © 6501-3226 [employer]. [address] [place] [address], [place] [address]. All rights reserved. This information is not intended as a substitute for professional medical care. Always follow your healthcare professional's instructions.

## 2019-03-15 NOTE — PROGRESS NOTES
Subjective:       Patient ID: Mg Torre is a 64 y.o. male.    Chief Complaint: Abnormal Lab    This is an interim appointment for Mr. Torre was found to have hyperkalemia on her recent lab.. Underlying medical issues of type 2 diabetes mellitus, hypertension, dyslipidemia been noted. His recent hemoglobin A1c has come down and is below 9 at this point. He is not taking any potassium supplements. He does take couple of vitamins including eye vitamins ( Areds) and a general vitamin (theragran).    I've had a detailed review of his medications and I do not see any medication which has a propensity for raising potassium. Is not on spironolactone. He is on hydrochlorothiazide. He does keep himself hydrated.    I checked his renal functions and they seem to be in acceptable range. He has not had a urine microalbumin recently. Please note that he is off ACE inhibitor/ARB's.      Diabetes   He presents for his follow-up diabetic visit. He has type 2 diabetes mellitus. His disease course has been worsening. Pertinent negatives for hypoglycemia include no confusion, dizziness, headaches, hunger, mood changes, nervousness/anxiousness, pallor, seizures, sleepiness, speech difficulty or sweats. Pertinent negatives for diabetes include no chest pain, no foot ulcerations, no polydipsia, no polyphagia, no weakness and no weight loss. There are no hypoglycemic complications. Symptoms are worsening. Risk factors for coronary artery disease include hypertension, male sex, sedentary lifestyle, dyslipidemia and diabetes mellitus. Current diabetic treatment includes oral agent (dual therapy) (Injection Trulicity 1.5 mg every weekly). He is compliant with treatment some of the time. His weight is stable. Diabetic current diet: EATING DATES. Meal planning includes avoidance of concentrated sweets. His home blood glucose trend is decreasing steadily. His breakfast blood glucose range is generally 140-180 mg/dl. An ACE  inhibitor/angiotensin II receptor blocker is not being taken (Hyperkalemia). He does not see a podiatrist.Eye exam is not current.   Hypertension   This is a chronic problem. The current episode started more than 1 year ago. The problem is unchanged. The problem is controlled. Pertinent negatives include no chest pain, headaches, malaise/fatigue, neck pain, palpitations, peripheral edema, PND, shortness of breath or sweats. Past treatments include ACE inhibitors (ACE stopped hyperkalemia). The current treatment provides moderate improvement. There is no history of coarctation of the aorta, hyperaldosteronism, pheochromocytoma or renovascular disease.   Hyperlipidemia   This is a chronic problem. The current episode started more than 1 year ago. Exacerbating diseases include obesity. Pertinent negatives include no chest pain or shortness of breath. The current treatment provides moderate improvement of lipids. Risk factors for coronary artery disease include a sedentary lifestyle, hypertension, diabetes mellitus, dyslipidemia, male sex and obesity.       Past Medical History:   Diagnosis Date    Allergy     pcn    Diabetes mellitus     Diabetes mellitus, type 2     Hyperlipidemia     Hypertension      Social History     Socioeconomic History    Marital status:      Spouse name: Not on file    Number of children: Not on file    Years of education: Not on file    Highest education level: Not on file   Social Needs    Financial resource strain: Not on file    Food insecurity - worry: Not on file    Food insecurity - inability: Not on file    Transportation needs - medical: Not on file    Transportation needs - non-medical: Not on file   Occupational History    Occupation:    Tobacco Use    Smoking status: Never Smoker    Smokeless tobacco: Never Used   Substance and Sexual Activity    Alcohol use: Yes    Drug use: No    Sexual activity: Yes     Partners: Female   Other Topics  "Concern    Not on file   Social History Narrative    Not on file     Past Surgical History:   Procedure Laterality Date    APPENDECTOMY      SPINE SURGERY       Family History   Problem Relation Age of Onset    Heart disease Father        Review of Systems   Constitutional: Negative for activity change, appetite change, malaise/fatigue, unexpected weight change (lost 2 lbs.+3 lbs= 5 lbs) and weight loss.   HENT: Negative for congestion, sneezing and trouble swallowing.    Eyes: Negative for pain and visual disturbance.   Respiratory: Negative for cough, chest tightness and shortness of breath.    Cardiovascular: Negative for chest pain, palpitations, leg swelling and PND.        Borderline hypertension.   Gastrointestinal: Negative for abdominal distention and abdominal pain.   Endocrine: Negative for cold intolerance, heat intolerance, polydipsia and polyphagia.        Diabetes mellitus.   Genitourinary: Negative for dysuria.   Musculoskeletal: Positive for arthralgias (hand pains). Negative for back pain, gait problem and neck pain.        Patient is status post right-sided hip arthroplasty and is recovering gradually with acceptable range of motion and quality of life.   Skin: Negative for pallor, rash and wound.        Extensive psoriasis on the skin especially in the trunk and back.   Neurological: Negative for dizziness, seizures, speech difficulty, weakness and headaches.   Psychiatric/Behavioral: Negative for agitation, behavioral problems and confusion. The patient is not nervous/anxious.          Objective:      Blood pressure 116/72, pulse 85, height 5' 8" (1.727 m), weight 90.7 kg (200 lb). Body mass index is 30.41 kg/m².  Physical Exam   Constitutional: He appears well-developed and well-nourished. No distress.   HENT:   Head: Normocephalic and atraumatic.   Eyes: Right eye exhibits no discharge. Left eye exhibits no discharge.   Neck: No tracheal deviation present. No thyromegaly present. "   Cardiovascular: Normal rate, regular rhythm and normal heart sounds.   Pulmonary/Chest: Breath sounds normal.   Abdominal: Soft. Bowel sounds are normal. There is no rebound.   Lymphadenopathy:     He has no cervical adenopathy.   Skin: Skin is dry. Rash (psoriasis) noted.   Extensive psoriasis noted on the skin in the trunk and back.   Psychiatric: He has a normal mood and affect.         Assessment:       1. Type 2 diabetes mellitus without complication, without long-term current use of insulin    2. Hyperkalemia    3. Benign essential hypertension    4. Multiple-type hyperlipidemia           Orders Only on 03/02/2019   Component Date Value Ref Range Status    Glucose 03/02/2019 202* 65 - 99 mg/dL Final    BUN, Bld 03/02/2019 12  8 - 27 mg/dL Final    Creatinine 03/02/2019 0.99  0.76 - 1.27 mg/dL Final    eGFR if non African American 03/02/2019 80  >59 mL/min/1.73 Final    eGFR if African American 03/02/2019 93  >59 mL/min/1.73 Final    BUN/Creatinine Ratio 03/02/2019 12  10 - 24 Final    Sodium 03/02/2019 138  134 - 144 mmol/L Final    Potassium 03/02/2019 5.4* 3.5 - 5.2 mmol/L Final    Chloride 03/02/2019 97  96 - 106 mmol/L Final    CO2 03/02/2019 24  20 - 29 mmol/L Final    Calcium 03/02/2019 10.1  8.6 - 10.2 mg/dL Final    Hemoglobin A1C 03/02/2019 8.4* 4.8 - 5.6 % Final         Plan:           Type 2 diabetes mellitus without complication, without long-term current use of insulin  -     Microalbumin/creatinine urine ratio; Future; Expected date: 03/15/2019    Hyperkalemia  -     Basic metabolic panel; Future; Expected date: 03/15/2019    Benign essential hypertension    Multiple-type hyperlipidemia      Patient's medications have been reviewed again. No medications noted to cause hyperkalemia have been noted. He is not on ACE inhibitor/ARB. He does take multivitamins and I've advised him to cut down on one of them. He can continue with the ophthalmology vitamin.    Continue with amlodipine for  blood pressure control.    Continue to monitor blood sugars. Hemoglobin A1c shows an improvement. 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    Follow-up in the next couple of weeks and repeat metabolic panel at that point. If he continues to have hyperkalemia, then may consider nephrology evaluation and he may have distal RTA type II.      Advised Mr. Torre to monitor Blood sugars at home and record them.  Exercise, watch diet and loose weight.  keep a close eye on feet and keep them clean. Annual eye examination. Annual influenza vaccine.  Monitor HgbA1c every 3 to 6 months. Monitor urine microalbumin every year.keep LDL less than 100. Monitor blood pressure and target blood pressure 120/70.        The patient is asked to make an attempt to improve diet and exercise patterns to aid in medical management of this problem.    Advised Mr. Torre about age and season appropriate immunizations/ cancer screenings.  Also seasonal influenza vaccine, update on tetanus diphtheria vaccination every 10 years.          Current Outpatient Medications:     amLODIPine (NORVASC) 10 MG tablet, Take 1 tablet (10 mg total) by mouth once daily., Disp: 90 tablet, Rfl: 2    atorvastatin (LIPITOR) 40 MG tablet, Take 1 tablet (40 mg total) by mouth once daily., Disp: 90 tablet, Rfl: 3    cetirizine (ZYRTEC) 10 mg Cap, Take 1 tablet by mouth once daily., Disp: , Rfl:     glipiZIDE (GLUCOTROL) 5 MG TR24, Take 1 tablet (5 mg total) by mouth 2 (two) times daily., Disp: 180 tablet, Rfl: 3    hydroCHLOROthiazide (HYDRODIURIL) 12.5 MG Tab, TAKE ONE TABLET BY MOUTH ONCE DAILY, Disp: 90 tablet, Rfl: 3    metFORMIN (GLUCOPHAGE) 500 MG tablet, TAKE 2 TABLETS BY MOUTH TWICE DAILY, Disp: 180 tablet, Rfl: 4    multivit-min/FA/lycopen/lutein (CENTRUM SILVER MEN ORAL), Take 1 tablet by mouth once daily., Disp: , Rfl:     multivitamin (THERAGRAN) per tablet, Take 1 tablet by mouth Daily., Disp: , Rfl:     omega-3 fatty acids  fish oil (OMEGA-3 2100) 1,050-1,200 mg Cap capsule, Take 1 capsule by mouth 2 (two) times daily., Disp: , Rfl:     TRULICITY 1.5 mg/0.5 mL PnIj, INJECT ONE SYRINGE SUBCUTANEOUSLY EVERY 7 DAYS, Disp: 12 Syringe, Rfl: 3    turmeric (CURCUMIN MISC), Curcumin, Disp: , Rfl:     turmeric root extract 500 mg Cap, Take 1 tablet by mouth 2 (two) times daily., Disp: , Rfl:     vit A/vit C/vit E/zinc/copper (VITAMINS  A,C,E-ZINC-COPPER ORAL), Take 1 tablet by mouth 2 (two) times daily., Disp: , Rfl:

## 2019-05-19 LAB
BUN SERPL-MCNC: 14 MG/DL (ref 8–27)
BUN/CREAT SERPL: 13 (ref 10–24)
CALCIUM SERPL-MCNC: 9.6 MG/DL (ref 8.6–10.2)
CHLORIDE SERPL-SCNC: 98 MMOL/L (ref 96–106)
CO2 SERPL-SCNC: 25 MMOL/L (ref 20–29)
CREAT SERPL-MCNC: 1.04 MG/DL (ref 0.76–1.27)
GLUCOSE SERPL-MCNC: 171 MG/DL (ref 65–99)
HBA1C MFR BLD: 7.2 % (ref 4.8–5.6)
POTASSIUM SERPL-SCNC: 4.9 MMOL/L (ref 3.5–5.2)
SODIUM SERPL-SCNC: 136 MMOL/L (ref 134–144)

## 2019-05-20 ENCOUNTER — TELEPHONE (OUTPATIENT)
Dept: FAMILY MEDICINE | Facility: CLINIC | Age: 64
End: 2019-05-20

## 2019-05-20 NOTE — TELEPHONE ENCOUNTER
----- Message from Salazar Campbell MD sent at 5/19/2019  7:55 AM CDT -----  Results are somewhat abnormal. Please keep regular follow up.

## 2019-05-24 ENCOUNTER — OFFICE VISIT (OUTPATIENT)
Dept: FAMILY MEDICINE | Facility: CLINIC | Age: 64
End: 2019-05-24
Payer: COMMERCIAL

## 2019-05-24 VITALS
SYSTOLIC BLOOD PRESSURE: 125 MMHG | HEART RATE: 88 BPM | HEIGHT: 68 IN | DIASTOLIC BLOOD PRESSURE: 78 MMHG | WEIGHT: 199 LBS | BODY MASS INDEX: 30.16 KG/M2

## 2019-05-24 DIAGNOSIS — E78.2 MULTIPLE-TYPE HYPERLIPIDEMIA: ICD-10-CM

## 2019-05-24 DIAGNOSIS — I10 BENIGN ESSENTIAL HYPERTENSION: ICD-10-CM

## 2019-05-24 DIAGNOSIS — E11.9 ENCOUNTER FOR DIABETIC FOOT EXAM: ICD-10-CM

## 2019-05-24 DIAGNOSIS — E11.9 TYPE 2 DIABETES MELLITUS WITHOUT COMPLICATION, WITHOUT LONG-TERM CURRENT USE OF INSULIN: Primary | ICD-10-CM

## 2019-05-24 PROCEDURE — 99214 OFFICE O/P EST MOD 30 MIN: CPT | Mod: ,,, | Performed by: INTERNAL MEDICINE

## 2019-05-24 PROCEDURE — 3008F BODY MASS INDEX DOCD: CPT | Mod: ,,, | Performed by: INTERNAL MEDICINE

## 2019-05-24 PROCEDURE — 3074F PR MOST RECENT SYSTOLIC BLOOD PRESSURE < 130 MM HG: ICD-10-PCS | Mod: ,,, | Performed by: INTERNAL MEDICINE

## 2019-05-24 PROCEDURE — 3045F PR MOST RECENT HEMOGLOBIN A1C LEVEL 7.0-9.0%: CPT | Mod: ,,, | Performed by: INTERNAL MEDICINE

## 2019-05-24 PROCEDURE — 3008F PR BODY MASS INDEX (BMI) DOCUMENTED: ICD-10-PCS | Mod: ,,, | Performed by: INTERNAL MEDICINE

## 2019-05-24 PROCEDURE — 99214 PR OFFICE/OUTPT VISIT, EST, LEVL IV, 30-39 MIN: ICD-10-PCS | Mod: ,,, | Performed by: INTERNAL MEDICINE

## 2019-05-24 PROCEDURE — 3078F DIAST BP <80 MM HG: CPT | Mod: ,,, | Performed by: INTERNAL MEDICINE

## 2019-05-24 PROCEDURE — 3078F PR MOST RECENT DIASTOLIC BLOOD PRESSURE < 80 MM HG: ICD-10-PCS | Mod: ,,, | Performed by: INTERNAL MEDICINE

## 2019-05-24 PROCEDURE — 3045F PR MOST RECENT HEMOGLOBIN A1C LEVEL 7.0-9.0%: ICD-10-PCS | Mod: ,,, | Performed by: INTERNAL MEDICINE

## 2019-05-24 PROCEDURE — 3074F SYST BP LT 130 MM HG: CPT | Mod: ,,, | Performed by: INTERNAL MEDICINE

## 2019-05-24 RX ORDER — HYDROCHLOROTHIAZIDE 12.5 MG/1
TABLET ORAL
Qty: 90 TABLET | Refills: 3 | OUTPATIENT
Start: 2019-05-24

## 2019-05-24 NOTE — PROGRESS NOTES
Subjective:       Patient ID: Mg Torre is a 64 y.o. male.    Chief Complaint: Diabetes (lab results); Hypertension; and Hyperlipidemia    Mr. Mg Torre is a 64-year-old  gentleman who comes for follow-up. Underlying medical issues of diabetes mellitus type 2, hypertension, dyslipidemia have been noted.    His hemoglobin A1c has come down to 7.2 which is the best in the last few months.    Unfortunately, recently he was involved in a work-related accident when a door  jammed his right hand and also fell on his face. He went through evaluation and treatment from his company's appointed doctor. He is slowly and gradually showing recovery at this point.    For blood pressure his taking amlodipine 10 mg with hydrochlorothiazide 12.5. Please note that with ACE inhibitors he had hyperkalemia and worsening of renal functions. For diabetes he is taking metformin, glipizide and injection Trulicity. There was some problem getting injection Trulicity from insurance but this has been resolved.    Diabetes   He presents for his follow-up diabetic visit. He has type 2 diabetes mellitus. His disease course has been improving. Pertinent negatives for hypoglycemia include no confusion, dizziness, headaches, nervousness/anxiousness, pallor, seizures or speech difficulty. Associated symptoms include weight loss. Pertinent negatives for diabetes include no chest pain, no foot paresthesias, no foot ulcerations, no polydipsia (1 lb), no polyphagia and no weakness. There are no hypoglycemic complications. Symptoms are improving. Pertinent negatives for diabetic complications include no nephropathy or peripheral neuropathy. Risk factors for coronary artery disease include sedentary lifestyle, hypertension, diabetes mellitus, male sex and dyslipidemia. Current diabetic treatment includes oral agent (dual therapy) (Inj Trulicity). He is compliant with treatment some of the time. His weight is stable. Meal planning  includes avoidance of concentrated sweets. He has not had a previous visit with a dietitian. An ACE inhibitor/angiotensin II receptor blocker is contraindicated. He does not see a podiatrist.Eye exam is current.   Hypertension   This is a chronic problem. The current episode started more than 1 year ago. The problem is controlled. Pertinent negatives include no chest pain, headaches, neck pain, palpitations or shortness of breath. Risk factors for coronary artery disease include male gender, dyslipidemia and diabetes mellitus. Past treatments include calcium channel blockers and diuretics. The current treatment provides moderate improvement. Compliance problems include psychosocial issues.  There is no history of coarctation of the aorta, hyperaldosteronism, pheochromocytoma or renovascular disease.   Hyperlipidemia   This is a chronic problem. The current episode started more than 1 year ago. The problem is controlled. He has no history of liver disease, obesity or nephrotic syndrome. Factors aggravating his hyperlipidemia include beta blockers. Pertinent negatives include no chest pain or shortness of breath. Current antihyperlipidemic treatment includes statins. The current treatment provides moderate improvement of lipids. Compliance problems include psychosocial issues.  Risk factors for coronary artery disease include hypertension, male sex and dyslipidemia.       Past Medical History:   Diagnosis Date    Allergy     pcn    Diabetes mellitus     Diabetes mellitus, type 2     Hyperlipidemia     Hypertension      Social History     Socioeconomic History    Marital status:      Spouse name: Sharyn Torre    Number of children: 2    Years of education: Not on file    Highest education level: Not on file   Occupational History    Occupation:    Social Needs    Financial resource strain: Not on file    Food insecurity:     Worry: Not on file     Inability: Not on file     Transportation needs:     Medical: Not on file     Non-medical: Not on file   Tobacco Use    Smoking status: Never Smoker    Smokeless tobacco: Never Used   Substance and Sexual Activity    Alcohol use: Yes    Drug use: No    Sexual activity: Yes     Partners: Female   Lifestyle    Physical activity:     Days per week: Not on file     Minutes per session: Not on file    Stress: Not at all   Relationships    Social connections:     Talks on phone: Not on file     Gets together: Not on file     Attends [employer] service: Not on file     Active member of club or organization: Not on file     Attends meetings of clubs or organizations: Not on file     Relationship status: Not on file   Other Topics Concern    Not on file   Social History Narrative    Together 2 children- raised 6 total with Ms Coles     Past Surgical History:   Procedure Laterality Date    APPENDECTOMY      SPINE SURGERY       Family History   Problem Relation Age of Onset    Heart disease Father        Review of Systems   Constitutional: Positive for weight loss. Negative for activity change, appetite change and unexpected weight change (lost 2 lbs.+3 lbs= 5 lbs).   HENT: Negative for congestion, sneezing and trouble swallowing.    Eyes: Negative for pain and visual disturbance.   Respiratory: Negative for cough, chest tightness and shortness of breath.    Cardiovascular: Negative for chest pain, palpitations and leg swelling.        Borderline hypertension.   Gastrointestinal: Negative for abdominal distention and abdominal pain.   Endocrine: Negative for cold intolerance, heat intolerance, polydipsia (1 lb) and polyphagia.        Diabetes mellitus.   Genitourinary: Negative for dysuria.   Musculoskeletal: Positive for arthralgias (hand pains). Negative for back pain, gait problem and neck pain.        Patient is status post right-sided hip arthroplasty and is recovering gradually with acceptable range of motion and quality of life.  "  Skin: Negative for pallor, rash and wound.        Extensive psoriasis on the skin especially in the trunk and back.   Neurological: Negative for dizziness, seizures, speech difficulty, weakness and headaches.   Psychiatric/Behavioral: Negative for agitation, behavioral problems and confusion. The patient is not nervous/anxious.          Objective:      Blood pressure 125/78, pulse 88, height 5' 8" (1.727 m), weight 90.3 kg (199 lb). Body mass index is 30.26 kg/m².  Physical Exam   Constitutional: He appears well-developed and well-nourished. No distress.   HENT:   Head: Normocephalic and atraumatic.   Eyes: Right eye exhibits no discharge. Left eye exhibits no discharge.   Neck: No tracheal deviation present. No thyromegaly present.   Cardiovascular: Normal rate, regular rhythm and normal heart sounds.   Pulmonary/Chest: Breath sounds normal.   Abdominal: Soft. Bowel sounds are normal. There is no rebound.   Musculoskeletal:        Right foot: There is normal range of motion and no deformity.        Left foot: There is no deformity.   Feet:   Right Foot:   Protective Sensation: 6 sites tested. 6 sites sensed.   Skin Integrity: Negative for ulcer, blister, skin breakdown or erythema.   Left Foot:   Protective Sensation: 6 sites tested. 6 sites sensed.   Skin Integrity: Negative for ulcer, blister, skin breakdown or erythema.   Lymphadenopathy:     He has no cervical adenopathy.   Skin: Skin is dry. Rash (psoriasis) noted.   Extensive psoriasis noted on the skin in the trunk and back.   Psychiatric: He has a normal mood and affect.         Assessment:       1. Type 2 diabetes mellitus without complication, without long-term current use of insulin    2. Benign essential hypertension    3. Multiple-type hyperlipidemia    4. Encounter for diabetic foot exam           Orders Only on 03/02/2019   Component Date Value Ref Range Status    Glucose 03/02/2019 202* 65 - 99 mg/dL Final    BUN, Bld 03/02/2019 12  8 - 27 mg/dL " Final    Creatinine 03/02/2019 0.99  0.76 - 1.27 mg/dL Final    eGFR if non African American 03/02/2019 80  >59 mL/min/1.73 Final    eGFR if African American 03/02/2019 93  >59 mL/min/1.73 Final    BUN/Creatinine Ratio 03/02/2019 12  10 - 24 Final    Sodium 03/02/2019 138  134 - 144 mmol/L Final    Potassium 03/02/2019 5.4* 3.5 - 5.2 mmol/L Final    Chloride 03/02/2019 97  96 - 106 mmol/L Final    CO2 03/02/2019 24  20 - 29 mmol/L Final    Calcium 03/02/2019 10.1  8.6 - 10.2 mg/dL Final    Hemoglobin A1C 03/02/2019 8.4* 4.8 - 5.6 % Final         Plan:           Type 2 diabetes mellitus without complication, without long-term current use of insulin  -     Basic metabolic panel; Future; Expected date: 05/24/2019  -     Microalbumin/creatinine urine ratio; Future; Expected date: 05/24/2019  -     Hemoglobin A1c; Future; Expected date: 05/24/2019    Benign essential hypertension    Multiple-type hyperlipidemia  -     ALT (SGPT); Future; Expected date: 05/24/2019  -     Lipid panel; Future; Expected date: 05/24/2019    Encounter for diabetic foot exam      Advised Mr. Torre to monitor Blood sugars at home and record them.  Exercise, watch diet and loose weight.  keep a close eye on feet and keep them clean. Annual eye examination. Annual influenza vaccine.  Monitor HgbA1c every 3 to 6 months. Monitor urine microalbumin every year.keep LDL less than 100. Monitor blood pressure and target blood pressure 120/70.        Patient has been advised to watch diet and exercise. Avoid fried and fatty food. Compliance to medications and follow up urged.    The patient is asked to make an attempt to improve diet and exercise patterns to aid in medical management of this problem.    Advised Mr. Torre about age and season appropriate immunizations/ cancer screenings.  Also seasonal influenza vaccine, update on tetanus diphtheria vaccination every 10 years.          Spent more than 25 minutes with patient which involved  review of his medical conditions, labs, medications and with 50% of time face-to-face discussion about medical problems, management and any applicable changes.    Current Outpatient Medications:     amLODIPine (NORVASC) 10 MG tablet, Take 1 tablet (10 mg total) by mouth once daily., Disp: 90 tablet, Rfl: 2    atorvastatin (LIPITOR) 40 MG tablet, Take 1 tablet (40 mg total) by mouth once daily., Disp: 90 tablet, Rfl: 3    cetirizine (ZYRTEC) 10 mg Cap, Take 1 tablet by mouth once daily., Disp: , Rfl:     glipiZIDE (GLUCOTROL) 5 MG TR24, Take 1 tablet (5 mg total) by mouth 2 (two) times daily., Disp: 180 tablet, Rfl: 3    hydroCHLOROthiazide (HYDRODIURIL) 12.5 MG Tab, TAKE ONE TABLET BY MOUTH ONCE DAILY, Disp: 90 tablet, Rfl: 3    metFORMIN (GLUCOPHAGE) 500 MG tablet, TAKE 2 TABLETS BY MOUTH TWICE DAILY, Disp: 180 tablet, Rfl: 4    multivit-min/FA/lycopen/lutein (CENTRUM SILVER MEN ORAL), Take 1 tablet by mouth once daily., Disp: , Rfl:     omega-3 fatty acids fish oil (OMEGA-3 2100) 1,050-1,200 mg Cap capsule, Take 1 capsule by mouth 2 (two) times daily., Disp: , Rfl:     TRULICITY 1.5 mg/0.5 mL PnIj, INJECT ONE SYRINGE SUBCUTANEOUSLY EVERY 7 DAYS, Disp: 12 Syringe, Rfl: 3    turmeric (CURCUMIN MISC), Curcumin, Disp: , Rfl:     turmeric root extract 500 mg Cap, Take 1 tablet by mouth 2 (two) times daily., Disp: , Rfl:     vit A/vit C/vit E/zinc/copper (VITAMINS  A,C,E-ZINC-COPPER ORAL), Take 1 tablet by mouth 2 (two) times daily., Disp: , Rfl:

## 2019-05-24 NOTE — PATIENT INSTRUCTIONS
Using a Blood Sugar Log    You have diabetes. This means your body has trouble regulating a sugar called glucose. To help manage your diabetes, youll need to check your blood sugar level as directed by your healthcare provider. Keeping a log of your blood sugar levels will help you track your blood sugar readings. Its a simple and easy way to see how well you are controlling your diabetes.  Checking your blood sugar level  You can check your blood sugar level with a blood glucose meter. Youll first prick the side of your finger with a tiny lancet to draw a tiny drop of blood onto the test strip. Some glucose meters let you use another place on your body to test. But these other places should not be used in some cases as they may be inaccurate. Follow the instructions for your glucose meter. And talk with your healthcare provider before doing the test on other places.  The strip goes into the meter first, then a drop of blood is placed on the tip of the strip. The meter then shows a reading that tells you the level of your blood sugar. Your readings should be in your target range as often as possible. This means not too high or too low. Staying in this range helps lower your risk for complications. Your healthcare provider will help you figure out the target range that is best for you.  Tracking your readings  Every time you check your blood sugar, use your log to keep track of your readings. Your meter will also probably have a memory feature that your healthcare provider can check at your next visit. You may be advised by your healthcare provider to check your blood sugar in the morning, at bedtime, and before and after meals. Be sure to write down all of your numbers. Also use your log to record things that might have affected your blood sugar. Some examples include being sick, certain medicines, being physically active, feeling stressed, or skipping meals.   Lessons learned from your readings  Tracking your  blood sugar readings helps you see patterns. These patterns tell you how your actions affect your blood sugar. For instance, you may have higher numbers after eating certain foods or lower numbers after exercise. They just help you understand how to stay in your target range more often, so that your diabetes remains in good control.  Sharing your log with your healthcare team  Bring your blood sugar log and glucose meter with you to all of your healthcare appointments. This can help your healthcare team make changes to your treatment plan, if needed. This may involve making changes in what you eat, what medicines you take, or how much you exercise.  To learn more  The resources below can help you learn more:  · American Diabetes Association 683-917-8831 www.diabetes.org  · [employer] 739-164-0215 www.lighthouse.org  · [employer] [facility] 640-480-3555 www.nei.nih.gov  · [facility] 561-180-2153 www.hormone.org  Date Last Reviewed: 5/1/2016  © 7655-2029 [employer]. [address] [place] [address]. All rights reserved. This information is not intended as a substitute for professional medical care. Always follow your healthcare professional's instructions.        Diet: Diabetes  Food is an important tool that you can use to control diabetes and stay healthy. Eating well-balanced meals in the correct amounts will help you control your blood glucose levels and prevent low blood sugar reactions. It will also help you reduce the health risks of diabetes. There is no one specific diet that is right for everyone with diabetes. But there are general guidelines to follow. A registered dietitian (RD) will create a tailored diet approach thats just right for you. He or she will also help you plan healthy meals and snacks. If you have any questions, call your dietitian for advice.     Guidelines for success  Talk with your healthcare provider before starting a diabetes diet or  weight loss program. If you haven't talked with a dietitian yet, ask your provider for a referral. The following guidelines can help you succeed:  · Select foods from the 6 food groups below. Your dietitian will help you find food choices within each group. He or she will also show you serving sizes and how many servings you can have at each meal.  ¨ Grains, beans, and starchy vegetables  ¨ Vegetables  ¨ Fruit  ¨ Milk or yogurt  ¨ Meat, poultry, fish, or tofu  ¨ Healthy fats  · Check your blood sugar levels as directed by your provider. Take any medicine as prescribed by your provider.  · Learn to read food labels and pick the right portion sizes.  · Eat only the amount of food in your meal plan. Eat about the same amount of food at regular times each day. Dont skip meals. Eat meals 4 to 5 hours apart, with snacks in between.  · Limit alcohol. It raises blood sugar levels. Drink water or calorie-free diet drinks that use safe sweeteners.  · Eat less fat to help lower your risk of heart disease. Use nonfat or low-fat dairy products and lean meats. Avoid fried foods. Use cooking oils that are unsaturated, such as olive, canola, or peanut oil.  · Talk with your dietitian about safe sugar substitutes.  · Avoid added salt. It can contribute to high blood pressure, which can cause heart disease. People with diabetes already have a risk of high blood pressure and heart disease.  · Stay at a healthy weight. If you need to lose weight, cut down on your portion sizes. But dont skip meals. Exercise is an important part of any weight management program. Talk with your provider about an exercise program thats right for you.  · For more information about the best diet plan for you, talk with a registered dietitian (RD). To find an RD in your area, contact:  ¨ Academy of Nutrition and Dietetics www.eatright.org  ¨ The American Diabetes Association 956-021-9832 www.diabetes.org  Date Last Reviewed: 8/1/2016  © 4062-8665 [employer]. [address] [place] [address], [place] [address]. All rights reserved. This information is not intended as a substitute for professional medical care. Always follow your healthcare professional's instructions.        Your Diabetes Foot Care Program    Every day you depend on your feet to keep you moving. But when you have diabetes, your feet need special care. Even a small foot problem can become very serious. So dont take your feet for granted. By working with your diabetes healthcare team, you can learn how to protect your feet and keep them healthy.  Evaluating your feet  An evaluation helps your healthcare provider check the condition of your feet. The evaluation includes a review of your diabetes history and overall health. It may also include a foot exam, X-rays, or other tests. These can help show problems beneath the skin that you cant see or feel.  Medical history  You will be asked about your overall health and any history of foot problems. Youll also discuss your diabetes history, such as whether your blood sugar level has changed over time. It also includes questions about sensations of pain, tingling, pins and needles, or numbness. Your healthcare provider will also want to know if you have high blood pressure and heart disease, or if you smoke. Be sure to mention any medicines (including over-the-counter), supplements, or herbal remedies you take.  Foot exam  A foot exam checks the condition of different parts of your foot. First, your skin and nails are examined for any signs of infection. Blood flow is checked by feeling for the pulses in each foot. You may also have tests to study the nerves in the foot. These include using a small filament (wire) to see how sensitive your feet are. In certain cases, you will be asked to walk a short distance to check for bone, joint, and muscle problems.  Diagnostic tests  If needed, your healthcare provider will suggest certain tests to learn  more about your feet. These include:  · Doppler tests to measure blood flow in the feet and lower leg.  · X-rays, which can show bone or joint problems.  · Other imaging tests, such as an MRI (magnetic resonance imaging), bone scan, and CT (computed tomography) scan. These can help show bone infections.  · Other tests, such as vascular tests, which study the blood flow in your feet and legs. You may also have nerve studies to learn how sensitive your feet are.  Creating a foot care program  Based on the evaluation, your healthcare provider will create a foot care program for you. Your program may be as simple as starting a daily self-care routine and changing the types of shoes your wear. It may also involve treating minor foot problems, such as a corn or blister. In some cases, surgery will be needed to treat an infection or mechanical problems, such as hammer toes.  Preventing problems  When you have diabetes, its easier to prevent problems than to treat them later on. So see your healthcare team for regular checkups and foot care. Your healthcare team can also help you learn more about caring for your feet at home. For example, you may be told to avoid walking barefoot. Or you may be told that special footwear is needed to protect your feet.  Have regular checkups  Foot problems can develop quickly. So be sure to follow your healthcare teams schedule for regular checkups. During office visits, take off your shoes and socks as soon as you get in the exam room. Ask your healthcare provider to examine your feet for problems. This will make it easier to find and treat small skin irritations before they get worse. Regular checkups can also help keep track of the blood flow and feeling in your feet. If you have neuropathy (lack of feeling in your feet), you will need to have checkups more often.  Learn about self-care  The more you know about diabetes and your feet, the easier it will be to prevent problems. Members  of your healthcare team can teach you how to inspect your feet and teach you to look for warning signs. They can also give you other foot care tips. During office visits, be sure to ask any questions you have.  Date Last Reviewed: 7/1/2016  © 0963-5230 [employer]. [address] [place] [address]. All rights reserved. This information is not intended as a substitute for professional medical care. Always follow your healthcare professional's instructions.

## 2019-06-02 LAB
ALBUMIN/CREAT UR: 9.7 MG/G CREAT (ref 0–30)
ALT SERPL-CCNC: 22 IU/L (ref 0–44)
BUN SERPL-MCNC: 12 MG/DL (ref 8–27)
BUN/CREAT SERPL: 12 (ref 10–24)
CALCIUM SERPL-MCNC: 9.9 MG/DL (ref 8.6–10.2)
CHLORIDE SERPL-SCNC: 97 MMOL/L (ref 96–106)
CO2 SERPL-SCNC: 22 MMOL/L (ref 20–29)
CREAT SERPL-MCNC: 0.99 MG/DL (ref 0.76–1.27)
CREAT UR-MCNC: 47.6 MG/DL
GLUCOSE SERPL-MCNC: 171 MG/DL (ref 65–99)
MICROALBUMIN UR-MCNC: 4.6 UG/ML
POTASSIUM SERPL-SCNC: 4.7 MMOL/L (ref 3.5–5.2)
SODIUM SERPL-SCNC: 137 MMOL/L (ref 134–144)

## 2019-06-03 ENCOUNTER — TELEPHONE (OUTPATIENT)
Dept: FAMILY MEDICINE | Facility: CLINIC | Age: 64
End: 2019-06-03

## 2019-06-03 RX ORDER — HYDROCHLOROTHIAZIDE 12.5 MG/1
TABLET ORAL
Qty: 90 TABLET | Refills: 3 | OUTPATIENT
Start: 2019-06-03

## 2019-06-03 RX ORDER — HYDROCHLOROTHIAZIDE 12.5 MG/1
12.5 TABLET ORAL DAILY
Qty: 90 TABLET | Refills: 3 | Status: SHIPPED | OUTPATIENT
Start: 2019-06-03 | End: 2020-05-29

## 2019-06-03 NOTE — TELEPHONE ENCOUNTER
----- Message from Salazar Campbell MD sent at 6/3/2019  7:58 AM CDT -----  The results are within acceptable range.  Please keep regular follow up.

## 2019-07-05 DIAGNOSIS — E11.9 WELL CONTROLLED DIABETES MELLITUS: ICD-10-CM

## 2019-07-05 RX ORDER — DULAGLUTIDE 1.5 MG/.5ML
INJECTION, SOLUTION SUBCUTANEOUS
Qty: 12 SYRINGE | Refills: 3 | Status: SHIPPED | OUTPATIENT
Start: 2019-07-05 | End: 2020-01-31 | Stop reason: SDUPTHER

## 2019-08-29 DIAGNOSIS — I10 BENIGN ESSENTIAL HYPERTENSION: ICD-10-CM

## 2019-08-29 RX ORDER — METFORMIN HYDROCHLORIDE 500 MG/1
TABLET ORAL
Qty: 180 TABLET | Refills: 4 | Status: SHIPPED | OUTPATIENT
Start: 2019-08-29 | End: 2020-04-11

## 2019-08-29 RX ORDER — AMLODIPINE BESYLATE 10 MG/1
TABLET ORAL
Qty: 90 TABLET | Refills: 2 | Status: SHIPPED | OUTPATIENT
Start: 2019-08-29 | End: 2020-05-29

## 2019-09-15 LAB
CHOLEST SERPL-MCNC: 129 MG/DL (ref 100–199)
HBA1C MFR BLD: 6.9 % (ref 4.8–5.6)
HDLC SERPL-MCNC: 44 MG/DL
LDLC SERPL CALC-MCNC: 71 MG/DL (ref 0–99)
TRIGL SERPL-MCNC: 68 MG/DL (ref 0–149)
VLDLC SERPL CALC-MCNC: 14 MG/DL (ref 5–40)

## 2019-09-16 ENCOUNTER — TELEPHONE (OUTPATIENT)
Dept: FAMILY MEDICINE | Facility: CLINIC | Age: 64
End: 2019-09-16

## 2019-09-16 NOTE — TELEPHONE ENCOUNTER
----- Message from Salazar Campbell MD sent at 9/15/2019  3:11 PM CDT -----  The results are within acceptable range.  Please keep regular follow up.

## 2019-09-27 ENCOUNTER — OFFICE VISIT (OUTPATIENT)
Dept: FAMILY MEDICINE | Facility: CLINIC | Age: 64
End: 2019-09-27
Payer: COMMERCIAL

## 2019-09-27 VITALS
HEART RATE: 80 BPM | WEIGHT: 197 LBS | BODY MASS INDEX: 29.86 KG/M2 | DIASTOLIC BLOOD PRESSURE: 77 MMHG | HEIGHT: 68 IN | SYSTOLIC BLOOD PRESSURE: 133 MMHG

## 2019-09-27 DIAGNOSIS — Z23 NEEDS FLU SHOT: ICD-10-CM

## 2019-09-27 DIAGNOSIS — E11.9 TYPE 2 DIABETES MELLITUS WITHOUT COMPLICATION, WITHOUT LONG-TERM CURRENT USE OF INSULIN: Primary | ICD-10-CM

## 2019-09-27 DIAGNOSIS — L40.8 OTHER PSORIASIS: ICD-10-CM

## 2019-09-27 DIAGNOSIS — E78.2 MULTIPLE-TYPE HYPERLIPIDEMIA: ICD-10-CM

## 2019-09-27 DIAGNOSIS — I10 BENIGN ESSENTIAL HYPERTENSION: ICD-10-CM

## 2019-09-27 PROCEDURE — 90471 IMMUNIZATION ADMIN: CPT | Mod: S$GLB,,, | Performed by: INTERNAL MEDICINE

## 2019-09-27 PROCEDURE — 3044F PR MOST RECENT HEMOGLOBIN A1C LEVEL <7.0%: ICD-10-PCS | Mod: S$GLB,,, | Performed by: INTERNAL MEDICINE

## 2019-09-27 PROCEDURE — 3075F SYST BP GE 130 - 139MM HG: CPT | Mod: S$GLB,,, | Performed by: INTERNAL MEDICINE

## 2019-09-27 PROCEDURE — 3044F HG A1C LEVEL LT 7.0%: CPT | Mod: S$GLB,,, | Performed by: INTERNAL MEDICINE

## 2019-09-27 PROCEDURE — 3008F PR BODY MASS INDEX (BMI) DOCUMENTED: ICD-10-PCS | Mod: S$GLB,,, | Performed by: INTERNAL MEDICINE

## 2019-09-27 PROCEDURE — 90686 FLU VACCINE (QUAD) GREATER THAN OR EQUAL TO 3YO PRESERVATIVE FREE IM: ICD-10-PCS | Mod: S$GLB,,, | Performed by: INTERNAL MEDICINE

## 2019-09-27 PROCEDURE — 99999 PR PBB SHADOW E&M-EST. PATIENT-LVL IV: ICD-10-PCS | Mod: PBBFAC,,, | Performed by: INTERNAL MEDICINE

## 2019-09-27 PROCEDURE — 3075F PR MOST RECENT SYSTOLIC BLOOD PRESS GE 130-139MM HG: ICD-10-PCS | Mod: S$GLB,,, | Performed by: INTERNAL MEDICINE

## 2019-09-27 PROCEDURE — 99214 OFFICE O/P EST MOD 30 MIN: CPT | Mod: 25,S$GLB,, | Performed by: INTERNAL MEDICINE

## 2019-09-27 PROCEDURE — 3078F DIAST BP <80 MM HG: CPT | Mod: S$GLB,,, | Performed by: INTERNAL MEDICINE

## 2019-09-27 PROCEDURE — 3008F BODY MASS INDEX DOCD: CPT | Mod: S$GLB,,, | Performed by: INTERNAL MEDICINE

## 2019-09-27 PROCEDURE — 99214 PR OFFICE/OUTPT VISIT, EST, LEVL IV, 30-39 MIN: ICD-10-PCS | Mod: 25,S$GLB,, | Performed by: INTERNAL MEDICINE

## 2019-09-27 PROCEDURE — 3078F PR MOST RECENT DIASTOLIC BLOOD PRESSURE < 80 MM HG: ICD-10-PCS | Mod: S$GLB,,, | Performed by: INTERNAL MEDICINE

## 2019-09-27 PROCEDURE — 99999 PR PBB SHADOW E&M-EST. PATIENT-LVL IV: CPT | Mod: PBBFAC,,, | Performed by: INTERNAL MEDICINE

## 2019-09-27 PROCEDURE — 90686 IIV4 VACC NO PRSV 0.5 ML IM: CPT | Mod: S$GLB,,, | Performed by: INTERNAL MEDICINE

## 2019-09-27 PROCEDURE — 90471 FLU VACCINE (QUAD) GREATER THAN OR EQUAL TO 3YO PRESERVATIVE FREE IM: ICD-10-PCS | Mod: S$GLB,,, | Performed by: INTERNAL MEDICINE

## 2019-09-27 NOTE — PATIENT INSTRUCTIONS
Diet: Diabetes  Food is an important tool that you can use to control diabetes and stay healthy. Eating well-balanced meals in the correct amounts will help you control your blood glucose levels and prevent low blood sugar reactions. It will also help you reduce the health risks of diabetes. There is no one specific diet that is right for everyone with diabetes. But there are general guidelines to follow. A registered dietitian (RD) will create a tailored diet approach thats just right for you. He or she will also help you plan healthy meals and snacks. If you have any questions, call your dietitian for advice.     Guidelines for success  Talk with your healthcare provider before starting a diabetes diet or weight loss program. If you haven't talked with a dietitian yet, ask your provider for a referral. The following guidelines can help you succeed:  · Select foods from the 6 food groups below. Your dietitian will help you find food choices within each group. He or she will also show you serving sizes and how many servings you can have at each meal.  ¨ Grains, beans, and starchy vegetables  ¨ Vegetables  ¨ Fruit  ¨ Milk or yogurt  ¨ Meat, poultry, fish, or tofu  ¨ Healthy fats  · Check your blood sugar levels as directed by your provider. Take any medicine as prescribed by your provider.  · Learn to read food labels and pick the right portion sizes.  · Eat only the amount of food in your meal plan. Eat about the same amount of food at regular times each day. Dont skip meals. Eat meals 4 to 5 hours apart, with snacks in between.  · Limit alcohol. It raises blood sugar levels. Drink water or calorie-free diet drinks that use safe sweeteners.  · Eat less fat to help lower your risk of heart disease. Use nonfat or low-fat dairy products and lean meats. Avoid fried foods. Use cooking oils that are unsaturated, such as olive, canola, or peanut oil.  · Talk with your dietitian about safe sugar substitutes.  · Avoid  added salt. It can contribute to high blood pressure, which can cause heart disease. People with diabetes already have a risk of high blood pressure and heart disease.  · Stay at a healthy weight. If you need to lose weight, cut down on your portion sizes. But dont skip meals. Exercise is an important part of any weight management program. Talk with your provider about an exercise program thats right for you.  · For more information about the best diet plan for you, talk with a registered dietitian (RD). To find an RD in your area, contact:  ¨ Academy of Nutrition and Dietetics www.eatright.org  ¨ The American Diabetes Association [phone] www.diabetes.org  Date Last Reviewed: 8/1/2016 © 2000-2017 [employer]. [address] [place] [address], [place] [address]. All rights reserved. This information is not intended as a substitute for professional medical care. Always follow your healthcare professional's instructions.

## 2019-09-27 NOTE — PROGRESS NOTES
Subjective:       Patient ID: Mg Torre is a 64 y.o. male.    Chief Complaint: Diabetes (lab review ); Hypertension; and Hyperlipidemia    Patient comes for follow-up.  64-year-old  male.  He recently had a eye checkup and colonoscopy also.    Patient has extensive psoriasis lesions also.  They have seen on television that Eucrisa is a new medication for psoriasis.  Thus far there has been no approval for Eucresa for psoriasis.  I have always told the patient to probably see his dermatologist for his extensive psoriasis.  Eucrisa is not likely to be covered and I am sure the cost will be prohibitive.    Diabetes   He presents for his follow-up diabetic visit. He has type 2 diabetes mellitus. His disease course has been stable. Pertinent negatives for hypoglycemia include no confusion, dizziness, headaches, nervousness/anxiousness, pallor, seizures or speech difficulty. Pertinent negatives for diabetes include no chest pain, no polydipsia, no polyphagia and no weakness. Risk factors for coronary artery disease include dyslipidemia, diabetes mellitus, male sex and hypertension. Current diabetic treatment includes oral agent (dual therapy) (Inj Trulicity). He has not had a previous visit with a dietitian. His home blood glucose trend is decreasing steadily. An ACE inhibitor/angiotensin II receptor blocker is contraindicated. He does not see a podiatrist.  Hypertension   This is a chronic problem. The current episode started more than 1 year ago. The problem is controlled. Pertinent negatives include no chest pain, headaches, neck pain, palpitations or shortness of breath. Risk factors for coronary artery disease include male gender, dyslipidemia and diabetes mellitus. Past treatments include calcium channel blockers and diuretics. The current treatment provides moderate improvement. There is no history of chronic renal disease, coarctation of the aorta, hyperaldosteronism, pheochromocytoma or  renovascular disease.   Hyperlipidemia   This is a chronic problem. The current episode started more than 1 year ago. He has no history of chronic renal disease, diabetes, hypothyroidism, liver disease or obesity. Pertinent negatives include no chest pain or shortness of breath. Current antihyperlipidemic treatment includes statins. The current treatment provides moderate improvement of lipids. Risk factors for coronary artery disease include hypertension, male sex and dyslipidemia.       Past Medical History:   Diagnosis Date    Allergy     pcn    Diabetes mellitus     Diabetes mellitus, type 2     Hyperlipidemia     Hypertension      Social History     Socioeconomic History    Marital status:      Spouse name: Sharyn Torre    Number of children: 2    Years of education: Not on file    Highest education level: Not on file   Occupational History    Occupation:    Social Needs    Financial resource strain: Not on file    Food insecurity:     Worry: Not on file     Inability: Not on file    Transportation needs:     Medical: Not on file     Non-medical: Not on file   Tobacco Use    Smoking status: Never Smoker    Smokeless tobacco: Never Used   Substance and Sexual Activity    Alcohol use: Yes    Drug use: No    Sexual activity: Yes     Partners: Female   Lifestyle    Physical activity:     Days per week: Not on file     Minutes per session: Not on file    Stress: Not at all   Relationships    Social connections:     Talks on phone: Not on file     Gets together: Not on file     Attends [employer] service: Not on file     Active member of club or organization: Not on file     Attends meetings of clubs or organizations: Not on file     Relationship status: Not on file   Other Topics Concern    Not on file   Social History Narrative    Together 2 children- raised 6 total with Ms Coles     Past Surgical History:   Procedure Laterality Date    APPENDECTOMY      SPINE SURGERY    "    Family History   Problem Relation Age of Onset    Heart disease Father     Obesity Son        Review of Systems   Constitutional: Negative for activity change, appetite change and unexpected weight change (Lost 2 lbs).   HENT: Negative for congestion, sneezing and trouble swallowing.    Eyes: Negative for pain and visual disturbance.   Respiratory: Negative for cough, chest tightness and shortness of breath.    Cardiovascular: Negative for chest pain, palpitations and leg swelling.        Borderline hypertension.   Gastrointestinal: Negative for abdominal distention and abdominal pain.   Endocrine: Negative for cold intolerance, heat intolerance, polydipsia and polyphagia.        Diabetes mellitus.   Genitourinary: Negative for dysuria.   Musculoskeletal: Positive for arthralgias (hand pains). Negative for back pain, gait problem and neck pain.        Patient is status post right-sided hip arthroplasty and is recovering gradually with acceptable range of motion and quality of life.   Skin: Positive for rash. Negative for pallor and wound.        Extensive psoriasis on the skin especially in the trunk and back.   Neurological: Negative for dizziness, seizures, speech difficulty, weakness and headaches.   Psychiatric/Behavioral: Negative for agitation, behavioral problems and confusion. The patient is not nervous/anxious.          Objective:      Blood pressure 133/77, pulse 80, height 5' 8" (1.727 m), weight 89.4 kg (197 lb). Body mass index is 29.95 kg/m².  Physical Exam   Constitutional: He appears well-developed and well-nourished. No distress.   HENT:   Head: Normocephalic and atraumatic.   Eyes: Right eye exhibits no discharge. Left eye exhibits no discharge. No scleral icterus.   Neck: No JVD present. No tracheal deviation present. No thyromegaly present.   Cardiovascular: Normal rate, regular rhythm and normal heart sounds.   No murmur heard.  Pulmonary/Chest: Breath sounds normal. No stridor. No " respiratory distress.   Abdominal: Soft. Bowel sounds are normal. He exhibits no distension. There is no tenderness. There is no rebound.   Musculoskeletal: He exhibits no edema, tenderness or deformity.        Right foot: There is normal range of motion and no deformity.        Left foot: There is no deformity.   Feet:   Right Foot:   Protective Sensation: 6 sites tested. 6 sites sensed.   Skin Integrity: Negative for ulcer, blister, skin breakdown or erythema.   Left Foot:   Protective Sensation: 6 sites tested. 6 sites sensed.   Skin Integrity: Negative for ulcer, blister, skin breakdown or erythema.   Lymphadenopathy:     He has no cervical adenopathy.   Neurological: He is alert.   Skin: Skin is dry. Rash (psoriasis) noted.   Extensive psoriasis noted on the skin in the trunk and back.   Psychiatric: He has a normal mood and affect.         Assessment:       1. Type 2 diabetes mellitus without complication, without long-term current use of insulin    2. Benign essential hypertension    3. Multiple-type hyperlipidemia    4. Needs flu shot    5. Other psoriasis           No visits with results within 3 Month(s) from this visit.   Latest known visit with results is:   Office Visit on 05/24/2019   Component Date Value Ref Range Status    Glucose 06/01/2019 171* 65 - 99 mg/dL Final    BUN, Bld 06/01/2019 12  8 - 27 mg/dL Final    Creatinine 06/01/2019 0.99  0.76 - 1.27 mg/dL Final    eGFR if non African American 06/01/2019 80  >59 mL/min/1.73 Final    eGFR if  06/01/2019 93  >59 mL/min/1.73 Final    BUN/Creatinine Ratio 06/01/2019 12  10 - 24 Final    Sodium 06/01/2019 137  134 - 144 mmol/L Final    Potassium 06/01/2019 4.7  3.5 - 5.2 mmol/L Final    Chloride 06/01/2019 97  96 - 106 mmol/L Final    CO2 06/01/2019 22  20 - 29 mmol/L Final    Calcium 06/01/2019 9.9  8.6 - 10.2 mg/dL Final    ALT 06/01/2019 22  0 - 44 IU/L Final    Creatinine, Random Ur 06/01/2019 47.6  Not Estab. mg/dL  Final    Microalb, Ur 06/01/2019 4.6  Not Estab. ug/mL Final    Microalb/Crt. Ratio 06/01/2019 9.7  0.0 - 30.0 mg/g creat Final    Hemoglobin A1C 09/14/2019 6.9* 4.8 - 5.6 % Final    Cholesterol 09/14/2019 129  100 - 199 mg/dL Final    Triglycerides 09/14/2019 68  0 - 149 mg/dL Final    HDL 09/14/2019 44  >39 mg/dL Final    VLDL Cholesterol Dinesh 09/14/2019 14  5 - 40 mg/dL Final    LDL Calculated 09/14/2019 71  0 - 99 mg/dL Final     Patient's hemoglobin A1c is better this time.    Plan:           Type 2 diabetes mellitus without complication, without long-term current use of insulin  -     Hemoglobin A1c; Future; Expected date: 01/20/2020    Benign essential hypertension  -     Comprehensive metabolic panel; Future; Expected date: 01/20/2020    Multiple-type hyperlipidemia    Needs flu shot  -     Influenza - Quadrivalent (PF)    Other psoriasis  -     crisaborole (EUCRISA) 2 % Oint; Apply 1 application topically 2 (two) times daily.  Dispense: 100 g; Refill: 1  -     Ambulatory Referral to Dermatology    Advised Mr. Torre to monitor Blood sugars at home and record them.  Exercise, watch diet and loose weight.  keep a close eye on feet and keep them clean. Annual eye examination. Annual influenza vaccine.  Monitor HgbA1c every 3 to 6 months. Monitor urine microalbumin every year.keep LDL less than 100. Monitor blood pressure and target blood pressure 120/70.  Patient has been advised to watch diet and exercise. Avoid fried and fatty food. Compliance to medications and follow up urged.    Multiple medical conditions labs have been reviewed.  Psoriasis condition also has been reviewed.  Prescription has been given.  Perhaps he is better off seeing Dermatology.    Follow-up in 4 months.    Spent julee 25 minutes with patient which involved review of pts medical conditions, labs, medications and with 50% of time face-to-face discussion about medical problems, management and any applicable changes.    Current  Outpatient Medications:     amLODIPine (NORVASC) 10 MG tablet, TAKE 1 TABLET BY MOUTH ONCE DAILY, Disp: 90 tablet, Rfl: 2    atorvastatin (LIPITOR) 40 MG tablet, Take 1 tablet (40 mg total) by mouth once daily., Disp: 90 tablet, Rfl: 3    cetirizine (ZYRTEC) 10 mg Cap, Take 1 tablet by mouth once daily., Disp: , Rfl:     glipiZIDE (GLUCOTROL) 5 MG TR24, Take 1 tablet (5 mg total) by mouth 2 (two) times daily., Disp: 180 tablet, Rfl: 3    hydroCHLOROthiazide (HYDRODIURIL) 12.5 MG Tab, Take 1 tablet (12.5 mg total) by mouth once daily., Disp: 90 tablet, Rfl: 3    metFORMIN (GLUCOPHAGE) 500 MG tablet, TAKE 2 TABLETS BY MOUTH TWICE DAILY, Disp: 180 tablet, Rfl: 4    omega-3 fatty acids fish oil (OMEGA-3 2100) 1,050-1,200 mg Cap capsule, Take 1 capsule by mouth 2 (two) times daily., Disp: , Rfl:     TRULICITY 1.5 mg/0.5 mL PnIj, INJECT 1 SYRINGE SUBCUTANEOUSLY EVERY 7 DAYS, Disp: 12 Syringe, Rfl: 3    turmeric root extract 500 mg Cap, Take 1 tablet by mouth 2 (two) times daily., Disp: , Rfl:     crisaborole (EUCRISA) 2 % Oint, Apply 1 application topically 2 (two) times daily., Disp: 100 g, Rfl: 1

## 2019-12-11 ENCOUNTER — OFFICE VISIT (OUTPATIENT)
Dept: FAMILY MEDICINE | Facility: CLINIC | Age: 64
End: 2019-12-11
Payer: COMMERCIAL

## 2019-12-11 VITALS
BODY MASS INDEX: 29.86 KG/M2 | DIASTOLIC BLOOD PRESSURE: 72 MMHG | WEIGHT: 197 LBS | SYSTOLIC BLOOD PRESSURE: 134 MMHG | HEART RATE: 89 BPM | HEIGHT: 68 IN | TEMPERATURE: 99 F

## 2019-12-11 DIAGNOSIS — J06.9 UPPER RESPIRATORY TRACT INFECTION, UNSPECIFIED TYPE: Primary | ICD-10-CM

## 2019-12-11 PROCEDURE — 3075F SYST BP GE 130 - 139MM HG: CPT | Mod: S$GLB,,, | Performed by: INTERNAL MEDICINE

## 2019-12-11 PROCEDURE — 3078F DIAST BP <80 MM HG: CPT | Mod: S$GLB,,, | Performed by: INTERNAL MEDICINE

## 2019-12-11 PROCEDURE — 3008F PR BODY MASS INDEX (BMI) DOCUMENTED: ICD-10-PCS | Mod: S$GLB,,, | Performed by: INTERNAL MEDICINE

## 2019-12-11 PROCEDURE — 3075F PR MOST RECENT SYSTOLIC BLOOD PRESS GE 130-139MM HG: ICD-10-PCS | Mod: S$GLB,,, | Performed by: INTERNAL MEDICINE

## 2019-12-11 PROCEDURE — 99212 OFFICE O/P EST SF 10 MIN: CPT | Mod: 25,S$GLB,, | Performed by: INTERNAL MEDICINE

## 2019-12-11 PROCEDURE — 3078F PR MOST RECENT DIASTOLIC BLOOD PRESSURE < 80 MM HG: ICD-10-PCS | Mod: S$GLB,,, | Performed by: INTERNAL MEDICINE

## 2019-12-11 PROCEDURE — 96372 THER/PROPH/DIAG INJ SC/IM: CPT | Mod: S$GLB,,, | Performed by: INTERNAL MEDICINE

## 2019-12-11 PROCEDURE — 96372 PR INJECTION,THERAP/PROPH/DIAG2ST, IM OR SUBCUT: ICD-10-PCS | Mod: S$GLB,,, | Performed by: INTERNAL MEDICINE

## 2019-12-11 PROCEDURE — 3008F BODY MASS INDEX DOCD: CPT | Mod: S$GLB,,, | Performed by: INTERNAL MEDICINE

## 2019-12-11 PROCEDURE — 99212 PR OFFICE/OUTPT VISIT, EST, LEVL II, 10-19 MIN: ICD-10-PCS | Mod: 25,S$GLB,, | Performed by: INTERNAL MEDICINE

## 2019-12-11 RX ORDER — DEXAMETHASONE SODIUM PHOSPHATE 4 MG/ML
4 INJECTION, SOLUTION INTRA-ARTICULAR; INTRALESIONAL; INTRAMUSCULAR; INTRAVENOUS; SOFT TISSUE
Status: COMPLETED | OUTPATIENT
Start: 2019-12-11 | End: 2019-12-11

## 2019-12-11 RX ORDER — DOXYCYCLINE 100 MG/1
100 CAPSULE ORAL 2 TIMES DAILY
Qty: 14 CAPSULE | Refills: 0 | Status: SHIPPED | OUTPATIENT
Start: 2019-12-11 | End: 2020-01-31

## 2019-12-11 RX ORDER — PROMETHAZINE HYDROCHLORIDE AND DEXTROMETHORPHAN HYDROBROMIDE 6.25; 15 MG/5ML; MG/5ML
5 SYRUP ORAL EVERY 8 HOURS PRN
Qty: 90 ML | Refills: 0 | Status: SHIPPED | OUTPATIENT
Start: 2019-12-11 | End: 2019-12-21

## 2019-12-11 RX ORDER — PREDNISONE 20 MG/1
20 TABLET ORAL DAILY
Qty: 4 TABLET | Refills: 0 | Status: SHIPPED | OUTPATIENT
Start: 2019-12-11 | End: 2019-12-15

## 2019-12-11 RX ADMIN — DEXAMETHASONE SODIUM PHOSPHATE 4 MG: 4 INJECTION, SOLUTION INTRA-ARTICULAR; INTRALESIONAL; INTRAMUSCULAR; INTRAVENOUS; SOFT TISSUE at 09:12

## 2019-12-11 NOTE — PROGRESS NOTES
Subjective:       Patient ID: Mg Torre is a 64 y.o. male.    Chief Complaint: URI (resp) and Cough    URI    This is a new problem. The current episode started in the past 7 days. The problem has been waxing and waning. There has been no fever. Associated symptoms include congestion, coughing and a sore throat. Pertinent negatives include no abdominal pain or chest pain.   Cough   This is a new problem. The current episode started in the past 7 days. The problem has been unchanged. Associated symptoms include nasal congestion, postnasal drip and a sore throat. Pertinent negatives include no chest pain, chills, eye redness, fever or shortness of breath. The treatment provided no relief.       Past Medical History:   Diagnosis Date    Allergy     pcn    Diabetes mellitus     Diabetes mellitus, type 2     Hyperlipidemia     Hypertension      Social History     Socioeconomic History    Marital status:      Spouse name: Sharyn Torre    Number of children: 2    Years of education: Not on file    Highest education level: Not on file   Occupational History    Occupation:    Social Needs    Financial resource strain: Not on file    Food insecurity:     Worry: Not on file     Inability: Not on file    Transportation needs:     Medical: Not on file     Non-medical: Not on file   Tobacco Use    Smoking status: Never Smoker    Smokeless tobacco: Never Used   Substance and Sexual Activity    Alcohol use: Yes    Drug use: No    Sexual activity: Yes     Partners: Female   Lifestyle    Physical activity:     Days per week: Not on file     Minutes per session: Not on file    Stress: Not at all   Relationships    Social connections:     Talks on phone: Not on file     Gets together: Not on file     Attends [employer] service: Not on file     Active member of club or organization: Not on file     Attends meetings of clubs or organizations: Not on file     Relationship status: Not on  "file   Other Topics Concern    Not on file   Social History Narrative    Together 2 children- raised 6 total with Ms Coles     Past Surgical History:   Procedure Laterality Date    APPENDECTOMY      SPINE SURGERY       Family History   Problem Relation Age of Onset    Heart disease Father     Obesity Son        Review of Systems   Constitutional: Negative for chills, fatigue and fever.   HENT: Positive for congestion, postnasal drip, sinus pressure and sore throat. Negative for nosebleeds and voice change.    Eyes: Negative for photophobia, redness, itching and visual disturbance.   Respiratory: Positive for cough. Negative for choking and shortness of breath.    Cardiovascular: Negative for chest pain, palpitations and leg swelling.   Gastrointestinal: Negative for abdominal distention and abdominal pain.         Objective:      Blood pressure 134/72, pulse 89, temperature 98.5 °F (36.9 °C), height 5' 8" (1.727 m), weight 89.4 kg (197 lb). Body mass index is 29.95 kg/m².  Physical Exam   Constitutional: He appears well-developed and well-nourished. No distress.   HENT:   Nose: No mucosal edema. Right sinus exhibits no maxillary sinus tenderness and no frontal sinus tenderness. Left sinus exhibits no maxillary sinus tenderness and no frontal sinus tenderness.   Mouth/Throat: No posterior oropharyngeal edema or posterior oropharyngeal erythema.   Eyes: Right eye exhibits no discharge. Left eye exhibits no discharge. No scleral icterus.   Neck: No thyromegaly present.   Cardiovascular: Normal rate and regular rhythm.   No murmur heard.  Pulmonary/Chest: Breath sounds normal. No stridor. No respiratory distress.   Abdominal: Soft. There is no tenderness.   Lymphadenopathy:     He has no cervical adenopathy.   Neurological: He is alert.   Skin: Rash (psoriasis) noted.   Extensive psoriasis noted on the skin in the trunk and back.   Psychiatric: He has a normal mood and affect.         Assessment:       1. Upper " respiratory tract infection, unspecified type                 Plan:           Upper respiratory tract infection, unspecified type  -     doxycycline (MONODOX) 100 MG capsule; Take 1 capsule (100 mg total) by mouth 2 (two) times daily.  Dispense: 14 capsule; Refill: 0  -     predniSONE (DELTASONE) 20 MG tablet; Take 1 tablet (20 mg total) by mouth once daily. for 4 days  Dispense: 4 tablet; Refill: 0  -     dexamethasone injection 4 mg  -     promethazine-dextromethorphan (PROMETHAZINE-DM) 6.25-15 mg/5 mL Syrp; Take 5 mLs by mouth every 8 (eight) hours as needed (cough- do not drive on this medicine).  Dispense: 90 mL; Refill: 0     Do not drive on promethazine DM.  Increase your water intake to 64-80 oz daily    Nasal Saline spray (Over the counter [place] spray or [place])  2 sprays each nostril 2-3 times a day for nasal congestion    Tylenol 500 mg 2 tablets or Ibuprofen 200 mg 2 tablets every 4-6 hours as needed for fever, headaches, sore throat, ear pain, bodyaches, and/or nasal/sinus inflammation    Warm salt water gargles with 1/2 cup water and 1 tablespoon salt every 4 hours    Warm tea with honey and lemon    Follow up with PCP in 1 week if symptoms do not resolve              Current Outpatient Medications:     amLODIPine (NORVASC) 10 MG tablet, TAKE 1 TABLET BY MOUTH ONCE DAILY, Disp: 90 tablet, Rfl: 2    atorvastatin (LIPITOR) 40 MG tablet, Take 1 tablet (40 mg total) by mouth once daily., Disp: 90 tablet, Rfl: 3    cetirizine (ZYRTEC) 10 mg Cap, Take 1 tablet by mouth once daily., Disp: , Rfl:     crisaborole (EUCRISA) 2 % Oint, Apply 1 application topically 2 (two) times daily., Disp: 100 g, Rfl: 1    glipiZIDE (GLUCOTROL) 5 MG TR24, Take 1 tablet (5 mg total) by mouth 2 (two) times daily., Disp: 180 tablet, Rfl: 3    hydroCHLOROthiazide (HYDRODIURIL) 12.5 MG Tab, Take 1 tablet (12.5 mg total) by mouth once daily., Disp: 90 tablet, Rfl: 3    metFORMIN (GLUCOPHAGE) 500 MG tablet, TAKE 2 TABLETS BY MOUTH  TWICE DAILY, Disp: 180 tablet, Rfl: 4    omega-3 fatty acids fish oil (OMEGA-3 2100) 1,050-1,200 mg Cap capsule, Take 1 capsule by mouth 2 (two) times daily., Disp: , Rfl:     TRULICITY 1.5 mg/0.5 mL PnIj, INJECT 1 SYRINGE SUBCUTANEOUSLY EVERY 7 DAYS, Disp: 12 Syringe, Rfl: 3    turmeric root extract 500 mg Cap, Take 1 tablet by mouth 2 (two) times daily., Disp: , Rfl:     doxycycline (MONODOX) 100 MG capsule, Take 1 capsule (100 mg total) by mouth 2 (two) times daily., Disp: 14 capsule, Rfl: 0    predniSONE (DELTASONE) 20 MG tablet, Take 1 tablet (20 mg total) by mouth once daily. for 4 days, Disp: 4 tablet, Rfl: 0    promethazine-dextromethorphan (PROMETHAZINE-DM) 6.25-15 mg/5 mL Syrp, Take 5 mLs by mouth every 8 (eight) hours as needed (cough- do not drive on this medicine)., Disp: 90 mL, Rfl: 0  No current facility-administered medications for this visit.

## 2019-12-11 NOTE — PATIENT INSTRUCTIONS
Viral Upper Respiratory Illness (Adult)  You have a viral upper respiratory illness (URI), which is another term for the common cold. This illness is contagious during the first few days. It is spread through the air by coughing and sneezing. It may also be spread by direct contact (touching the sick person and then touching your own eyes, nose, or mouth). Frequent handwashing will decrease risk of spread. Most viral illnesses go away within 7 to 10 days with rest and simple home remedies. Sometimes the illness may last for several weeks. Antibiotics will not kill a virus, and they are generally not prescribed for this condition.    Home care  · If symptoms are severe, rest at home for the first 2 to 3 days. When you resume activity, don't let yourself get too tired.  · Avoid being exposed to cigarette smoke (yours or others).  · You may use acetaminophen or ibuprofen to control pain and fever, unless another medicine was prescribed. (Note: If you have chronic liver or kidney disease, have ever had a stomach ulcer or gastrointestinal bleeding, or are taking blood-thinning medicines, talk with your healthcare provider before using these medicines.) Aspirin should never be given to anyone under 18 years of age who is ill with a viral infection or fever. It may cause severe liver or brain damage.  · Your appetite may be poor, so a light diet is fine. Avoid dehydration by drinking 6 to 8 glasses of fluids per day (water, soft drinks, juices, tea, or soup). Extra fluids will help loosen secretions in the nose and lungs.  · Over-the-counter cold medicines will not shorten the length of time youre sick, but they may be helpful for the following symptoms: cough, sore throat, and nasal and sinus congestion. (Note: Do not use decongestants if you have high blood pressure.)  Follow-up care  Follow up with your healthcare provider, or as advised.  When to seek medical advice  Call your healthcare provider right away if any  of these occur:  · Cough with lots of colored sputum (mucus)  · Severe headache; face, neck, or ear pain  · Difficulty swallowing due to throat pain  · Fever of 100.4°F (38°C)  Call 911, or get immediate medical care  Call emergency services right away if any of these occur:  · Chest pain, shortness of breath, wheezing, or difficulty breathing  · Coughing up blood  · Inability to swallow due to throat pain  Date Last Reviewed: 9/13/2015  © 5869-0854 [employer]. [address] [place] [address]. All rights reserved. This information is not intended as a substitute for professional medical care. Always follow your healthcare professional's instructions.

## 2019-12-29 LAB
ALBUMIN SERPL-MCNC: 4.2 G/DL (ref 3.6–4.8)
ALBUMIN/GLOB SERPL: 1.8 {RATIO} (ref 1.2–2.2)
ALP SERPL-CCNC: 83 IU/L (ref 39–117)
ALT SERPL-CCNC: 29 IU/L (ref 0–44)
AST SERPL-CCNC: 22 IU/L (ref 0–40)
BILIRUB SERPL-MCNC: 0.6 MG/DL (ref 0–1.2)
BUN SERPL-MCNC: 12 MG/DL (ref 8–27)
BUN/CREAT SERPL: 12 (ref 10–24)
CALCIUM SERPL-MCNC: 9.7 MG/DL (ref 8.6–10.2)
CHLORIDE SERPL-SCNC: 96 MMOL/L (ref 96–106)
CO2 SERPL-SCNC: 24 MMOL/L (ref 20–29)
CREAT SERPL-MCNC: 0.97 MG/DL (ref 0.76–1.27)
GLOBULIN SER CALC-MCNC: 2.3 G/DL (ref 1.5–4.5)
GLUCOSE SERPL-MCNC: 163 MG/DL (ref 65–99)
HBA1C MFR BLD: 6.9 % (ref 4.8–5.6)
POTASSIUM SERPL-SCNC: 5.1 MMOL/L (ref 3.5–5.2)
PROT SERPL-MCNC: 6.5 G/DL (ref 6–8.5)
SODIUM SERPL-SCNC: 136 MMOL/L (ref 134–144)

## 2020-01-31 ENCOUNTER — OFFICE VISIT (OUTPATIENT)
Dept: FAMILY MEDICINE | Facility: CLINIC | Age: 65
End: 2020-01-31
Payer: COMMERCIAL

## 2020-01-31 VITALS
HEART RATE: 82 BPM | WEIGHT: 191 LBS | DIASTOLIC BLOOD PRESSURE: 72 MMHG | BODY MASS INDEX: 28.95 KG/M2 | HEIGHT: 68 IN | SYSTOLIC BLOOD PRESSURE: 126 MMHG

## 2020-01-31 DIAGNOSIS — I10 ESSENTIAL HYPERTENSION: Primary | ICD-10-CM

## 2020-01-31 DIAGNOSIS — E78.2 MIXED DYSLIPIDEMIA: ICD-10-CM

## 2020-01-31 DIAGNOSIS — E11.9 TYPE 2 DIABETES MELLITUS WITHOUT COMPLICATION, WITHOUT LONG-TERM CURRENT USE OF INSULIN: ICD-10-CM

## 2020-01-31 DIAGNOSIS — E11.9 WELL CONTROLLED DIABETES MELLITUS: ICD-10-CM

## 2020-01-31 PROCEDURE — 3074F SYST BP LT 130 MM HG: CPT | Mod: S$GLB,,, | Performed by: INTERNAL MEDICINE

## 2020-01-31 PROCEDURE — 3074F PR MOST RECENT SYSTOLIC BLOOD PRESSURE < 130 MM HG: ICD-10-PCS | Mod: S$GLB,,, | Performed by: INTERNAL MEDICINE

## 2020-01-31 PROCEDURE — 99214 PR OFFICE/OUTPT VISIT, EST, LEVL IV, 30-39 MIN: ICD-10-PCS | Mod: S$GLB,,, | Performed by: INTERNAL MEDICINE

## 2020-01-31 PROCEDURE — 3078F PR MOST RECENT DIASTOLIC BLOOD PRESSURE < 80 MM HG: ICD-10-PCS | Mod: S$GLB,,, | Performed by: INTERNAL MEDICINE

## 2020-01-31 PROCEDURE — 3078F DIAST BP <80 MM HG: CPT | Mod: S$GLB,,, | Performed by: INTERNAL MEDICINE

## 2020-01-31 PROCEDURE — 99214 OFFICE O/P EST MOD 30 MIN: CPT | Mod: S$GLB,,, | Performed by: INTERNAL MEDICINE

## 2020-01-31 PROCEDURE — 3044F PR MOST RECENT HEMOGLOBIN A1C LEVEL <7.0%: ICD-10-PCS | Mod: S$GLB,,, | Performed by: INTERNAL MEDICINE

## 2020-01-31 PROCEDURE — 3008F PR BODY MASS INDEX (BMI) DOCUMENTED: ICD-10-PCS | Mod: S$GLB,,, | Performed by: INTERNAL MEDICINE

## 2020-01-31 PROCEDURE — 3008F BODY MASS INDEX DOCD: CPT | Mod: S$GLB,,, | Performed by: INTERNAL MEDICINE

## 2020-01-31 PROCEDURE — 3044F HG A1C LEVEL LT 7.0%: CPT | Mod: S$GLB,,, | Performed by: INTERNAL MEDICINE

## 2020-01-31 NOTE — PATIENT INSTRUCTIONS
Using a Blood Sugar Log    You have diabetes. This means your body has trouble regulating a sugar called glucose. To help manage your diabetes, youll need to check your blood sugar level as directed by your healthcare provider. Keeping a log of your blood sugar levels will help you track your blood sugar readings. Its a simple and easy way to see how well you are controlling your diabetes.  Checking your blood sugar level  You can check your blood sugar level with a blood glucose meter. Youll first prick the side of your finger with a tiny lancet to draw a tiny drop of blood onto the test strip. Some glucose meters let you use another place on your body to test. But these other places should not be used in some cases as they may be inaccurate. Follow the instructions for your glucose meter. And talk with your healthcare provider before doing the test on other places.  The strip goes into the meter first, then a drop of blood is placed on the tip of the strip. The meter then shows a reading that tells you the level of your blood sugar. Your readings should be in your target range as often as possible. This means not too high or too low. Staying in this range helps lower your risk for complications. Your healthcare provider will help you figure out the target range that is best for you.  Tracking your readings  Every time you check your blood sugar, use your log to keep track of your readings. Your meter will also probably have a memory feature that your healthcare provider can check at your next visit. You may be advised by your healthcare provider to check your blood sugar in the morning, at bedtime, and before and after meals. Be sure to write down all of your numbers. Also use your log to record things that might have affected your blood sugar. Some examples include being sick, certain medicines, being physically active, feeling stressed, or skipping meals.   Lessons learned from your readings  Tracking your  blood sugar readings helps you see patterns. These patterns tell you how your actions affect your blood sugar. For instance, you may have higher numbers after eating certain foods or lower numbers after exercise. They just help you understand how to stay in your target range more often, so that your diabetes remains in good control.  Sharing your log with your healthcare team  Bring your blood sugar log and glucose meter with you to all of your healthcare appointments. This can help your healthcare team make changes to your treatment plan, if needed. This may involve making changes in what you eat, what medicines you take, or how much you exercise.  To learn more  The resources below can help you learn more:  · American Diabetes Association 065-938-8741 www.diabetes.org  · [employer] 426-628-9280 www.lighthouse.org  · [employer] [place] 529-753-1159 www.nei.nih.gov  · [employer] 968-630-5305 www.hormone.org  Date Last Reviewed: 5/1/2016  © 8772-6571 [employer] [address] [place] [address], [place] [address]. All rights reserved. This information is not intended as a substitute for professional medical care. Always follow your healthcare professional's instructions.

## 2020-01-31 NOTE — PROGRESS NOTES
Subjective:       Patient ID: Mg Torre is a 64 y.o. male.    Chief Complaint: Hyperlipidemia and Diabetes    Patient comes for follow-up.  64-year-old  male.        Last visit was in December when he had a upper respiratory and cold symptom and was treated with a combination of cortisone injection, cortisone pills and doxycycline and promethazine DM.  He felt significantly better in a day or 2.  Of course the cortisone injections and will raised his blood sugars at that time.    He has lost weight in spite of [place] and holidays.          Hyperlipidemia   This is a chronic problem. The current episode started more than 1 year ago. He has no history of chronic renal disease, diabetes, hypothyroidism, liver disease or obesity. Pertinent negatives include no chest pain or shortness of breath. Current antihyperlipidemic treatment includes statins. The current treatment provides moderate improvement of lipids. Risk factors for coronary artery disease include hypertension, male sex and dyslipidemia.   Diabetes   He presents for his follow-up diabetic visit. He has type 2 diabetes mellitus. His disease course has been stable. Pertinent negatives for hypoglycemia include no confusion, dizziness, headaches, nervousness/anxiousness, pallor, seizures or speech difficulty. Pertinent negatives for diabetes include no chest pain, no polydipsia, no polyphagia and no weakness. Risk factors for coronary artery disease include dyslipidemia, diabetes mellitus, male sex and hypertension. Current diabetic treatment includes oral agent (dual therapy) (Inj Trulicity). He has not had a previous visit with a dietitian. His home blood glucose trend is decreasing steadily. An ACE inhibitor/angiotensin II receptor blocker is contraindicated. He does not see a podiatrist.  Hypertension   This is a chronic problem. The current episode started more than 1 year ago. The problem is controlled. Pertinent negatives include no  chest pain, headaches, neck pain, palpitations or shortness of breath. Risk factors for coronary artery disease include male gender, dyslipidemia and diabetes mellitus. Past treatments include calcium channel blockers and diuretics. The current treatment provides moderate improvement. There is no history of chronic renal disease, coarctation of the aorta, hyperaldosteronism, pheochromocytoma or renovascular disease.       Past Medical History:   Diagnosis Date    Allergy     pcn    Diabetes mellitus     Diabetes mellitus, type 2     Hyperlipidemia     Hypertension      Social History     Socioeconomic History    Marital status:      Spouse name: Sharyn Torre    Number of children: 2    Years of education: Not on file    Highest education level: Not on file   Occupational History    Occupation:    Social Needs    Financial resource strain: Not on file    Food insecurity:     Worry: Not on file     Inability: Not on file    Transportation needs:     Medical: Not on file     Non-medical: Not on file   Tobacco Use    Smoking status: Never Smoker    Smokeless tobacco: Never Used   Substance and Sexual Activity    Alcohol use: Yes    Drug use: No    Sexual activity: Yes     Partners: Female   Lifestyle    Physical activity:     Days per week: Not on file     Minutes per session: Not on file    Stress: Not at all   Relationships    Social connections:     Talks on phone: Not on file     Gets together: Not on file     Attends Gnosticism service: Not on file     Active member of club or organization: Not on file     Attends meetings of clubs or organizations: Not on file     Relationship status: Not on file   Other Topics Concern    Not on file   Social History Narrative    Together 2 children- raised 6 total with Ms Coles     Past Surgical History:   Procedure Laterality Date    APPENDECTOMY      SPINE SURGERY       Family History   Problem Relation Age of Onset    Heart  "disease Father     Obesity Son        Review of Systems   Constitutional: Negative for activity change, appetite change and unexpected weight change (Lost 2 lbs/lost +6 lbs).   HENT: Negative for congestion, sneezing and trouble swallowing.    Eyes: Negative for pain and visual disturbance.   Respiratory: Negative for cough, chest tightness and shortness of breath.    Cardiovascular: Negative for chest pain, palpitations and leg swelling.        Borderline hypertension.   Gastrointestinal: Negative for abdominal distention and abdominal pain.   Endocrine: Negative for cold intolerance, heat intolerance, polydipsia and polyphagia.        Diabetes mellitus.   Genitourinary: Negative for dysuria.   Musculoskeletal: Positive for arthralgias (hand pains). Negative for back pain, gait problem and neck pain.        Patient is status post right-sided hip arthroplasty and is recovering gradually with acceptable range of motion and quality of life.   Skin: Positive for rash (psoriasis). Negative for pallor and wound.        Extensive psoriasis on the skin especially in the trunk and back.   Neurological: Negative for dizziness, seizures, speech difficulty, weakness and headaches.   Psychiatric/Behavioral: Negative for agitation, behavioral problems and confusion. The patient is not nervous/anxious.          Objective:      Blood pressure 126/72, pulse 82, height 5' 8" (1.727 m), weight 86.6 kg (191 lb). Body mass index is 29.04 kg/m².  Physical Exam   Constitutional: He appears well-developed and well-nourished. No distress.   HENT:   Head: Normocephalic and atraumatic.   Eyes: Right eye exhibits no discharge. Left eye exhibits no discharge. No scleral icterus.   Neck: No JVD present. No tracheal deviation present. No thyromegaly present.   Cardiovascular: Normal rate, regular rhythm and normal heart sounds.   No murmur heard.  Pulmonary/Chest: Breath sounds normal. No stridor. No respiratory distress.   Abdominal: Soft. " Bowel sounds are normal. He exhibits no distension. There is no tenderness. There is no rebound.   Musculoskeletal: He exhibits no edema, tenderness or deformity.        Right foot: There is normal range of motion and no deformity.        Left foot: There is no deformity.   Feet:   Right Foot:   Protective Sensation: 6 sites tested. 6 sites sensed.   Skin Integrity: Negative for ulcer, blister, skin breakdown or erythema.   Left Foot:   Protective Sensation: 6 sites tested. 6 sites sensed.   Skin Integrity: Negative for ulcer, blister, skin breakdown or erythema.   Lymphadenopathy:     He has no cervical adenopathy.   Neurological: He is alert.   Skin: Skin is dry. Rash (psoriasis) noted.   Extensive psoriasis noted on the skin in the trunk and back.   Psychiatric: He has a normal mood and affect.         Assessment:       1. Essential hypertension    2. Type 2 diabetes mellitus without complication, without long-term current use of insulin    3. Mixed dyslipidemia    4. Well controlled diabetes mellitus           No visits with results within 3 Month(s) from this visit.   Latest known visit with results is:   Office Visit on 09/27/2019   Component Date Value Ref Range Status    Hemoglobin A1C 12/28/2019 6.9* 4.8 - 5.6 % Final    Glucose 12/28/2019 163* 65 - 99 mg/dL Final    BUN, Bld 12/28/2019 12  8 - 27 mg/dL Final    Creatinine 12/28/2019 0.97  0.76 - 1.27 mg/dL Final    eGFR if non African American 12/28/2019 82  >59 mL/min/1.73 Final    eGFR if African American 12/28/2019 95  >59 mL/min/1.73 Final    BUN/Creatinine Ratio 12/28/2019 12  10 - 24 Final    Sodium 12/28/2019 136  134 - 144 mmol/L Final    Potassium 12/28/2019 5.1  3.5 - 5.2 mmol/L Final    Chloride 12/28/2019 96  96 - 106 mmol/L Final    CO2 12/28/2019 24  20 - 29 mmol/L Final    Calcium 12/28/2019 9.7  8.6 - 10.2 mg/dL Final    Total Protein 12/28/2019 6.5  6.0 - 8.5 g/dL Final    Albumin 12/28/2019 4.2  3.6 - 4.8 g/dL Final     Globulin, Total 12/28/2019 2.3  1.5 - 4.5 g/dL Final    Albumin/Globulin Ratio 12/28/2019 1.8  1.2 - 2.2 Final    Total Bilirubin 12/28/2019 0.6  0.0 - 1.2 mg/dL Final    Alkaline Phosphatase 12/28/2019 83  39 - 117 IU/L Final    AST 12/28/2019 22  0 - 40 IU/L Final    ALT 12/28/2019 29  0 - 44 IU/L Final     Patient's hemoglobin A1c is better this time.    Plan:           Essential hypertension  -     Basic metabolic panel; Future; Expected date: 05/18/2020    Type 2 diabetes mellitus without complication, without long-term current use of insulin  -     Hemoglobin A1c; Future; Expected date: 05/18/2020    Mixed dyslipidemia  -     ALT (SGPT); Future; Expected date: 05/18/2020  -     Lipid panel; Future; Expected date: 05/18/2020    Well controlled diabetes mellitus  -     dulaglutide (TRULICITY) 1.5 mg/0.5 mL PnIj; INJECT 1 SYRINGE SUBCUTANEOUSLY EVERY 7 DAYS  Dispense: 12 Syringe; Refill: 3     Trulicity seems to be working.  He has lost about 6 lb of weight.  The cost of Trulicity might be a problem in future.  Patient plans to continue to work for other couple of years.  Hopefully his secondary insurance packs of the tabs.  He will be joining Medicare very soon.    I have given him a savings coupon from [employer].  Also new prescription for Trulicity.    Advised Mr. Torre to monitor Blood sugars at home and record them.  Exercise, watch diet and loose weight.  keep a close eye on feet and keep them clean. Annual eye examination. Annual influenza vaccine.  Monitor HgbA1c every 3 to 6 months. Monitor urine microalbumin every year.keep LDL less than 100. Monitor blood pressure and target blood pressure 120/70.  Patient has been advised to watch diet and exercise. Avoid fried and fatty food. Compliance to medications and follow up urged.    Multiple medical conditions labs have been reviewed.  Psoriasis condition also has been reviewed.  Prescription has been given.  Perhaps he is better off seeing  Dermatology.    Follow-up in 4 months.    Spent julee 25 minutes with patient which involved review of pts medical conditions, labs, medications and with 50% of time face-to-face discussion about medical problems, management and any applicable changes.    Current Outpatient Medications:     amLODIPine (NORVASC) 10 MG tablet, TAKE 1 TABLET BY MOUTH ONCE DAILY, Disp: 90 tablet, Rfl: 2    cetirizine (ZYRTEC) 10 mg Cap, Take 1 tablet by mouth once daily., Disp: , Rfl:     dulaglutide (TRULICITY) 1.5 mg/0.5 mL PnIj, INJECT 1 SYRINGE SUBCUTANEOUSLY EVERY 7 DAYS, Disp: 12 Syringe, Rfl: 3    glipiZIDE (GLUCOTROL) 5 MG TR24, Take 1 tablet (5 mg total) by mouth 2 (two) times daily., Disp: 180 tablet, Rfl: 3    hydroCHLOROthiazide (HYDRODIURIL) 12.5 MG Tab, Take 1 tablet (12.5 mg total) by mouth once daily., Disp: 90 tablet, Rfl: 3    metFORMIN (GLUCOPHAGE) 500 MG tablet, TAKE 2 TABLETS BY MOUTH TWICE DAILY, Disp: 180 tablet, Rfl: 4    omega-3 fatty acids fish oil (OMEGA-3 2100) 1,050-1,200 mg Cap capsule, Take 1 capsule by mouth 2 (two) times daily., Disp: , Rfl:     turmeric root extract 500 mg Cap, Take 1 tablet by mouth 2 (two) times daily., Disp: , Rfl:

## 2020-02-26 DIAGNOSIS — E11.9 TYPE 2 DIABETES MELLITUS WITHOUT COMPLICATION, WITHOUT LONG-TERM CURRENT USE OF INSULIN: ICD-10-CM

## 2020-02-27 RX ORDER — GLIPIZIDE 5 MG/1
TABLET, FILM COATED, EXTENDED RELEASE ORAL
Qty: 180 TABLET | Refills: 1 | Status: SHIPPED | OUTPATIENT
Start: 2020-02-27 | End: 2020-08-26

## 2020-02-28 ENCOUNTER — OCCUPATIONAL HEALTH (OUTPATIENT)
Dept: URGENT CARE | Facility: CLINIC | Age: 65
End: 2020-02-28

## 2020-02-28 DIAGNOSIS — Z02.89 ENCOUNTER FOR EXAMINATION REQUIRED BY DEPARTMENT OF TRANSPORTATION (DOT): Primary | ICD-10-CM

## 2020-02-28 PROCEDURE — 99499 PHYSICAL, RECERT DOT/CDL: ICD-10-PCS | Mod: S$GLB,,, | Performed by: NURSE PRACTITIONER

## 2020-02-28 PROCEDURE — 99499 UNLISTED E&M SERVICE: CPT | Mod: S$GLB,,, | Performed by: NURSE PRACTITIONER

## 2020-04-11 DIAGNOSIS — E78.2 MULTIPLE-TYPE HYPERLIPIDEMIA: ICD-10-CM

## 2020-04-11 RX ORDER — METFORMIN HYDROCHLORIDE 500 MG/1
TABLET ORAL
Qty: 360 TABLET | Refills: 1 | Status: SHIPPED | OUTPATIENT
Start: 2020-04-11 | End: 2020-11-15

## 2020-04-11 RX ORDER — ATORVASTATIN CALCIUM 40 MG/1
40 TABLET, FILM COATED ORAL NIGHTLY
Qty: 90 TABLET | Refills: 1 | Status: SHIPPED | OUTPATIENT
Start: 2020-04-11 | End: 2020-10-11

## 2020-05-28 DIAGNOSIS — I10 BENIGN ESSENTIAL HYPERTENSION: ICD-10-CM

## 2020-05-29 RX ORDER — HYDROCHLOROTHIAZIDE 12.5 MG/1
TABLET ORAL
Qty: 90 TABLET | Refills: 1 | Status: SHIPPED | OUTPATIENT
Start: 2020-05-29 | End: 2020-11-19

## 2020-05-29 RX ORDER — AMLODIPINE BESYLATE 10 MG/1
TABLET ORAL
Qty: 90 TABLET | Refills: 1 | Status: SHIPPED | OUTPATIENT
Start: 2020-05-29 | End: 2020-11-19

## 2020-05-31 LAB
ALT SERPL-CCNC: 23 IU/L (ref 0–44)
BUN SERPL-MCNC: 16 MG/DL (ref 8–27)
BUN/CREAT SERPL: 15 (ref 10–24)
CALCIUM SERPL-MCNC: 9.7 MG/DL (ref 8.6–10.2)
CHLORIDE SERPL-SCNC: 97 MMOL/L (ref 96–106)
CHOLEST SERPL-MCNC: 119 MG/DL (ref 100–199)
CO2 SERPL-SCNC: 24 MMOL/L (ref 20–29)
CREAT SERPL-MCNC: 1.08 MG/DL (ref 0.76–1.27)
GLUCOSE SERPL-MCNC: 220 MG/DL (ref 65–99)
HBA1C MFR BLD: 7.8 % (ref 4.8–5.6)
HDLC SERPL-MCNC: 49 MG/DL
LDLC SERPL CALC-MCNC: 60 MG/DL (ref 0–99)
POTASSIUM SERPL-SCNC: 5.3 MMOL/L (ref 3.5–5.2)
SODIUM SERPL-SCNC: 133 MMOL/L (ref 134–144)
TRIGL SERPL-MCNC: 49 MG/DL (ref 0–149)
VLDLC SERPL CALC-MCNC: 10 MG/DL (ref 5–40)

## 2020-06-05 ENCOUNTER — OFFICE VISIT (OUTPATIENT)
Dept: FAMILY MEDICINE | Facility: CLINIC | Age: 65
End: 2020-06-05

## 2020-06-05 VITALS
SYSTOLIC BLOOD PRESSURE: 123 MMHG | HEART RATE: 83 BPM | TEMPERATURE: 98 F | DIASTOLIC BLOOD PRESSURE: 72 MMHG | WEIGHT: 185 LBS | HEIGHT: 68 IN | BODY MASS INDEX: 28.04 KG/M2

## 2020-06-05 DIAGNOSIS — E11.9 TYPE 2 DIABETES MELLITUS WITHOUT COMPLICATION, WITHOUT LONG-TERM CURRENT USE OF INSULIN: Primary | ICD-10-CM

## 2020-06-05 DIAGNOSIS — E11.9 WELL CONTROLLED DIABETES MELLITUS: ICD-10-CM

## 2020-06-05 DIAGNOSIS — I10 ESSENTIAL HYPERTENSION: ICD-10-CM

## 2020-06-05 DIAGNOSIS — E78.2 MIXED DYSLIPIDEMIA: ICD-10-CM

## 2020-06-05 PROBLEM — J06.9 UPPER RESPIRATORY TRACT INFECTION: Status: RESOLVED | Noted: 2019-12-11 | Resolved: 2020-06-05

## 2020-06-05 PROCEDURE — 99214 OFFICE O/P EST MOD 30 MIN: CPT | Performed by: INTERNAL MEDICINE

## 2020-06-05 PROCEDURE — 99213 OFFICE O/P EST LOW 20 MIN: CPT | Mod: S$PBB,,, | Performed by: INTERNAL MEDICINE

## 2020-06-05 PROCEDURE — 99213 PR OFFICE/OUTPT VISIT, EST, LEVL III, 20-29 MIN: ICD-10-PCS | Mod: S$PBB,,, | Performed by: INTERNAL MEDICINE

## 2020-06-05 NOTE — PROGRESS NOTES
Subjective:       Patient ID: Mg Torre is a 65 y.o. male.    Chief Complaint: Diabetes (lab review); Hypertension; and Hyperlipidemia    Patient comes for follow-up.  65-year-old  male.        Unfortunately he had to changes company to a different [employer] company.  He is in between insurances at this point.  While he can afford most of his medications which are generic he cannot afford the injection Trulicity which is super expensive.    Naturally his hemoglobin A1c has gone up from  6.9-7.8.    His blood pressures are doing okay.  Somehow he has lost approximately 6 lb of weight and he attributes this to lack of air conditioning in his truck as provided by the new employer.               Diabetes   He presents for his follow-up diabetic visit. He has type 2 diabetes mellitus. His disease course has been stable. Pertinent negatives for hypoglycemia include no confusion, dizziness, headaches, nervousness/anxiousness, pallor, seizures or speech difficulty. Pertinent negatives for diabetes include no chest pain, no fatigue, no polydipsia, no polyphagia and no weakness. Risk factors for coronary artery disease include dyslipidemia, diabetes mellitus, male sex and hypertension. Current diabetic treatment includes oral agent (dual therapy) (Inj Trulicity). He has not had a previous visit with a dietitian. His home blood glucose trend is decreasing steadily. An ACE inhibitor/angiotensin II receptor blocker is contraindicated. He does not see a podiatrist.  Hyperlipidemia   This is a chronic problem. The current episode started more than 1 year ago. He has no history of chronic renal disease, diabetes, hypothyroidism, liver disease or obesity. Pertinent negatives include no chest pain or shortness of breath. Current antihyperlipidemic treatment includes statins. The current treatment provides moderate improvement of lipids. Risk factors for coronary artery disease include hypertension, male sex and  dyslipidemia.   Hypertension   This is a chronic problem. The current episode started more than 1 year ago. The problem is controlled. Pertinent negatives include no chest pain, headaches, neck pain, palpitations or shortness of breath. Risk factors for coronary artery disease include male gender, dyslipidemia and diabetes mellitus. Past treatments include calcium channel blockers and diuretics. The current treatment provides moderate improvement. Compliance problems include psychosocial issues.  There is no history of chronic renal disease, coarctation of the aorta, hyperaldosteronism, pheochromocytoma or renovascular disease.       Past Medical History:   Diagnosis Date    Allergy     pcn    Diabetes mellitus     Diabetes mellitus, type 2     Hyperlipidemia     Hypertension      Social History     Socioeconomic History    Marital status:      Spouse name: Sharyn Torre    Number of children: 2    Years of education: Not on file    Highest education level: Not on file   Occupational History    Occupation:    Social Needs    Financial resource strain: Not on file    Food insecurity:     Worry: Not on file     Inability: Not on file    Transportation needs:     Medical: Not on file     Non-medical: Not on file   Tobacco Use    Smoking status: Never Smoker    Smokeless tobacco: Never Used   Substance and Sexual Activity    Alcohol use: Yes    Drug use: No    Sexual activity: Yes     Partners: Female   Lifestyle    Physical activity:     Days per week: Not on file     Minutes per session: Not on file    Stress: Not at all   Relationships    Social connections:     Talks on phone: Not on file     Gets together: Not on file     Attends Mosque service: Not on file     Active member of club or organization: Not on file     Attends meetings of clubs or organizations: Not on file     Relationship status: Not on file   Other Topics Concern    Not on file   Social History Narrative  "   Together 2 children- raised 6 total with Ms Coles     Past Surgical History:   Procedure Laterality Date    APPENDECTOMY      SPINE SURGERY       Family History   Problem Relation Age of Onset    Heart disease Father     Obesity Son        Review of Systems   Constitutional: Negative for activity change, appetite change, chills, diaphoresis, fatigue, fever and unexpected weight change (lost 6 lbs- attributes to lack of AC in truck).   HENT: Negative for congestion, sneezing and trouble swallowing.    Eyes: Negative for pain and visual disturbance.   Respiratory: Negative for cough, chest tightness and shortness of breath.    Cardiovascular: Negative for chest pain, palpitations and leg swelling.        Borderline hypertension.   Gastrointestinal: Negative for abdominal distention and abdominal pain.   Endocrine: Negative for cold intolerance, heat intolerance, polydipsia and polyphagia.        Diabetes mellitus.   Genitourinary: Negative for dysuria.   Musculoskeletal: Positive for arthralgias (hand pains). Negative for back pain, gait problem and neck pain.        Patient is status post right-sided hip arthroplasty and is recovering gradually with acceptable range of motion and quality of life.   Skin: Positive for rash (psoriasis). Negative for pallor and wound.        Extensive psoriasis on the skin especially in the trunk and back.   Neurological: Negative for dizziness, seizures, speech difficulty, weakness and headaches.   Psychiatric/Behavioral: Negative for agitation, behavioral problems and confusion. The patient is not nervous/anxious.          Objective:      Blood pressure 123/72, pulse 83, temperature 98.4 °F (36.9 °C), height 5' 8" (1.727 m), weight 83.9 kg (185 lb). Body mass index is 28.13 kg/m².  Physical Exam   Constitutional: He appears well-developed and well-nourished. No distress.   HENT:   Head: Normocephalic and atraumatic.   Eyes: Right eye exhibits no discharge. Left eye exhibits no " discharge. No scleral icterus.   Neck: No JVD present. No tracheal deviation present. No thyromegaly present.   Cardiovascular: Normal rate, regular rhythm and normal heart sounds.   No murmur heard.  Pulmonary/Chest: Breath sounds normal. No stridor. No respiratory distress.   Abdominal: Soft. Bowel sounds are normal. He exhibits no distension. There is no tenderness. There is no rebound.   Musculoskeletal: He exhibits no edema, tenderness or deformity.        Right foot: There is normal range of motion and no deformity.        Left foot: There is no deformity.   Feet:   Right Foot:   Protective Sensation: 6 sites tested. 6 sites sensed.   Skin Integrity: Negative for ulcer, blister, skin breakdown or erythema.   Left Foot:   Protective Sensation: 6 sites tested. 6 sites sensed.   Skin Integrity: Negative for ulcer, blister, skin breakdown or erythema.   Lymphadenopathy:     He has no cervical adenopathy.   Neurological: He is alert.   Skin: Skin is dry. Rash (psoriasis) noted.   Extensive psoriasis noted on the skin in the trunk and back.   Psychiatric: He has a normal mood and affect.         Assessment:       1. Type 2 diabetes mellitus without complication, without long-term current use of insulin    2. Mixed dyslipidemia    3. Essential hypertension    4. Well controlled diabetes mellitus           No visits with results within 3 Month(s) from this visit.   Latest known visit with results is:   Office Visit on 01/31/2020   Component Date Value Ref Range Status    Hemoglobin A1C 05/30/2020 7.8* 4.8 - 5.6 % Final    Glucose 05/30/2020 220* 65 - 99 mg/dL Final    BUN, Bld 05/30/2020 16  8 - 27 mg/dL Final    Creatinine 05/30/2020 1.08  0.76 - 1.27 mg/dL Final    eGFR if non African American 05/30/2020 72  >59 mL/min/1.73 Final    eGFR if African American 05/30/2020 83  >59 mL/min/1.73 Final    BUN/Creatinine Ratio 05/30/2020 15  10 - 24 Final    Sodium 05/30/2020 133* 134 - 144 mmol/L Final    Potassium  05/30/2020 5.3* 3.5 - 5.2 mmol/L Final    Chloride 05/30/2020 97  96 - 106 mmol/L Final    CO2 05/30/2020 24  20 - 29 mmol/L Final    Calcium 05/30/2020 9.7  8.6 - 10.2 mg/dL Final    ALT 05/30/2020 23  0 - 44 IU/L Final    Cholesterol 05/30/2020 119  100 - 199 mg/dL Final    Triglycerides 05/30/2020 49  0 - 149 mg/dL Final    HDL 05/30/2020 49  >39 mg/dL Final    VLDL Cholesterol Dinesh 05/30/2020 10  5 - 40 mg/dL Final    LDL Calculated 05/30/2020 60  0 - 99 mg/dL Final     Patient's hemoglobin A1c is better this time.    Plan:           Type 2 diabetes mellitus without complication, without long-term current use of insulin  -     dulaglutide (TRULICITY) 1.5 mg/0.5 mL PnIj; INJECT 1 SYRINGE SUBCUTANEOUSLY EVERY 7 DAYS  Dispense: 2 Syringe; Refill: 0  -     Hemoglobin A1C; Future; Expected date: 09/03/2020    Mixed dyslipidemia    Essential hypertension    Well controlled diabetes mellitus  -     dulaglutide (TRULICITY) 1.5 mg/0.5 mL PnIj; INJECT 1 SYRINGE SUBCUTANEOUSLY EVERY 7 DAYS  Dispense: 2 Syringe; Refill: 0    Pt is in between jobs at this point.  He has run out of injection Trulicity.  He has a rise in his hemoglobin A1c.  We will see if we  can give him a few samples of Trulicity to hold him over.  He needs to continue to watch his weight which he is losing.  He should apply for Medicare also.  I have given him a couple of pens of Trulicity which he can take perhaps every 15 days to prolong it.          Advised Mr. Torre to monitor Blood sugars at home and record them.  Exercise, watch diet and loose weight.  keep a close eye on feet and keep them clean. Annual eye examination. Annual influenza vaccine.  Monitor HgbA1c every 3 to 6 months. Monitor urine microalbumin every year.keep LDL less than 100. Monitor blood pressure and target blood pressure 120/70.  Patient has been advised to watch diet and exercise. Avoid fried and fatty food. Compliance to medications and follow up urged.    Multiple  medical conditions labs have been reviewed.  Psoriasis condition also has been reviewed.  Prescription has been given.  Perhaps he is better off seeing Dermatology.    Fup 3 month    Current Outpatient Medications:     amLODIPine (NORVASC) 10 MG tablet, Take 1 tablet by mouth once daily, Disp: 90 tablet, Rfl: 1    atorvastatin (LIPITOR) 40 MG tablet, Take 1 tablet (40 mg total) by mouth every evening., Disp: 90 tablet, Rfl: 1    cetirizine (ZYRTEC) 10 mg Cap, Take 1 tablet by mouth once daily., Disp: , Rfl:     dulaglutide (TRULICITY) 1.5 mg/0.5 mL PnIj, INJECT 1 SYRINGE SUBCUTANEOUSLY EVERY 7 DAYS, Disp: 2 Syringe, Rfl: 0    glipiZIDE (GLUCOTROL) 5 MG TR24, TAKE 1 TABLET BY MOUTH TWICE DAILY, Disp: 180 tablet, Rfl: 1    hydroCHLOROthiazide (HYDRODIURIL) 12.5 MG Tab, Take 1 tablet by mouth once daily, Disp: 90 tablet, Rfl: 1    metFORMIN (GLUCOPHAGE) 500 MG tablet, Take 2 tablets by mouth twice daily, Disp: 360 tablet, Rfl: 1    omega-3 fatty acids fish oil (OMEGA-3 2100) 1,050-1,200 mg Cap capsule, Take 1 capsule by mouth 2 (two) times daily., Disp: , Rfl:     turmeric root extract 500 mg Cap, Take 1 tablet by mouth 2 (two) times daily., Disp: , Rfl:

## 2020-09-06 LAB — HBA1C MFR BLD: 8.1 % (ref 4.8–5.6)

## 2020-09-11 ENCOUNTER — OFFICE VISIT (OUTPATIENT)
Dept: FAMILY MEDICINE | Facility: CLINIC | Age: 65
End: 2020-09-11

## 2020-09-11 VITALS
SYSTOLIC BLOOD PRESSURE: 117 MMHG | TEMPERATURE: 97 F | BODY MASS INDEX: 27.74 KG/M2 | DIASTOLIC BLOOD PRESSURE: 74 MMHG | HEART RATE: 77 BPM | WEIGHT: 183 LBS | HEIGHT: 68 IN

## 2020-09-11 DIAGNOSIS — I10 ESSENTIAL HYPERTENSION: ICD-10-CM

## 2020-09-11 DIAGNOSIS — E78.2 MIXED DYSLIPIDEMIA: ICD-10-CM

## 2020-09-11 DIAGNOSIS — E11.9 TYPE 2 DIABETES MELLITUS WITHOUT COMPLICATION, WITHOUT LONG-TERM CURRENT USE OF INSULIN: Primary | ICD-10-CM

## 2020-09-11 PROCEDURE — 99213 PR OFFICE/OUTPT VISIT, EST, LEVL III, 20-29 MIN: ICD-10-PCS | Mod: S$PBB,,, | Performed by: INTERNAL MEDICINE

## 2020-09-11 PROCEDURE — 99214 OFFICE O/P EST MOD 30 MIN: CPT | Performed by: INTERNAL MEDICINE

## 2020-09-11 PROCEDURE — 99213 OFFICE O/P EST LOW 20 MIN: CPT | Mod: S$PBB,,, | Performed by: INTERNAL MEDICINE

## 2020-09-11 RX ORDER — PIOGLITAZONEHYDROCHLORIDE 30 MG/1
30 TABLET ORAL DAILY
Qty: 30 TABLET | Refills: 3 | Status: SHIPPED | OUTPATIENT
Start: 2020-09-11 | End: 2021-01-13 | Stop reason: ALTCHOICE

## 2020-09-11 NOTE — PATIENT INSTRUCTIONS
Diabetes: Activity Tips    Being more active can help you manage your diabetes. The tips on this sheet can help you get the most from your exercise. They can also help you stay safe.  Staying Active  Its important for adults to spend less time sitting and being inactive. This is especially true if you have type 2 diabetes. When you are sitting for long periods of time, get up for short sessions of light activity every 30 minutes.  You should aim for at least 150 minutes a week of exercise or physical activity. Dont let more than 2 days go by without being active.  Benefit from briskness  Brisk activity gets your heart beating faster. This can help you increase your fitness, lose extra weight, and manage your blood sugar level. Try brisk walking. Or, if you have foot or leg problems, you can try swimming or bike riding. You can break up your exercise into chunks throughout the day. Work up to at least 30 minutes of steady, brisk exercise on most days.  Warm up and cool down  Warming up and cooling down reduce your risk of injury. They also help limit muscle soreness. Do a mild version of your activity for 5 minutes before and after your routine. You can also learn stretches that will help keep your muscles loose. Your healthcare provider may show you good ways to warm up and stretch.  Do the talk-sing test  The talk-sing test is a simple way to tell how hard youre exercising. If you can talk while exercising, youre in a safe range. If youre out of breath, slow down. If you can carry a tune, its time to  the pace. Walk up a hill. Increase the resistance on your stationary bike. Or swim faster.  What about eating?  You may be told to plan your exercise for 1 to 2 hours after a meal. In most cases, you dont need to eat while being active. If you take insulin or medicine that can cause low blood sugar, test your blood sugar before exercising. And carry a fast-acting sugar that will raise your blood sugar  level quickly. This includes glucose tablets or hard candy. Use it if you feel low blood sugar symptoms.  Safety tips  These tips can help you stay safe as you become fit:  · Exercise with a friend or carry a cell phone if you have one.  · Carry or wear identification, such as a necklace or bracelet, that says you have diabetes.  · Use the proper footwear and safety equipment for your activity.  · Drink water before, during, and after exercise.  · Dress properly for the weather.  · Dont exercise in very hot or very cold weather.  · Dont exercise if you are sick.  · If you are instructed to do so, test your blood sugar before and after you exercise. Have a small carbohydrate snack if your blood sugar is low before you start exercising.   When to stop exercising and call your healthcare provider  Stop exercising and call your healthcare provider right away if you notice any of the following:  · Pain, pressure, tightness, or heaviness in the chest  · Pain or heaviness in the neck, shoulders, back, arms, legs, or feet  · Unusual shortness of breath  · Dizziness or lightheadedness  · Unusually rapid or slow pulse  · Increased joint or muscle pain  · Nausea or vomiting  Date Last Reviewed: 5/1/2016  © 1009-1820 [employer]. [address] [place] [address], [place] [address]. All rights reserved. This information is not intended as a substitute for professional medical care. Always follow your healthcare professional's instructions.

## 2020-09-11 NOTE — PROGRESS NOTES
Subjective:       Patient ID: Mg Torre is a 65 y.o. male.    Chief Complaint: Diabetes (labs review ), Hypertension, and Hyperlipidemia    Patient comes for follow-up.  65-year-old  male.  Underlying medical issues of type 2 diabetes mellitus, hyperlipidemia, hypertension have been noted.  He also has psoriasis and vitiligo.    Unfortunately he had to changes company to a different [employer] company.  He is in between insurances at this point.  He has known 65 now and possibly is applying for Medicare.  It is unclear to me and even to him whether he is eligible for Medicare.  He does not know how it works.  While he can afford most of his medications which are generic, he cannot afford the injection Trulicity which is super expensive.    Naturally his hemoglobin A1c has gone up to 8.1    His blood pressures are doing okay.  Somehow he has lost approximately 6 lb of weight and he attributes this to lack of air conditioning in his truck as provided by the new employer.  He has further lost 2 more lb of weight which he again attributes to his careful and cautious diet now.      Thus far, given his  jobs I have been trying to avoid insulin.  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  However, today he tells me that he may look forward to assisted in a year or so.  As to what has changed financially, to seek early assisted is unclear to me and I did not press any further.      Diabetes  He presents for his follow-up diabetic visit. He has type 2 diabetes mellitus. His disease course has been stable. Pertinent negatives for hypoglycemia include no confusion, dizziness, headaches, nervousness/anxiousness, pallor, seizures or speech difficulty. Pertinent negatives for diabetes include no chest pain, no fatigue, no polydipsia, no polyphagia and no weakness. There are no hypoglycemic complications. Risk factors for coronary  artery disease include dyslipidemia, diabetes mellitus, male sex and hypertension. Current diabetic treatment includes oral agent (dual therapy) (Inj Trulicity-he could not afford it and this has been discontinued.). He is compliant with treatment some of the time. His weight is decreasing steadily. Meal planning includes avoidance of concentrated sweets. He has not had a previous visit with a dietitian. His home blood glucose trend is increasing steadily. An ACE inhibitor/angiotensin II receptor blocker is contraindicated. He does not see a podiatrist.Eye exam is not current.   Hyperlipidemia  This is a chronic problem. The current episode started more than 1 year ago. He has no history of chronic renal disease, diabetes, hypothyroidism, liver disease or obesity. Pertinent negatives include no chest pain or shortness of breath. Current antihyperlipidemic treatment includes statins. The current treatment provides moderate improvement of lipids. Risk factors for coronary artery disease include hypertension, male sex, dyslipidemia, a sedentary lifestyle and diabetes mellitus.   Hypertension  This is a chronic problem. The current episode started more than 1 year ago. The problem is unchanged. The problem is controlled. Pertinent negatives include no chest pain, headaches, malaise/fatigue, neck pain, palpitations or shortness of breath. There are no associated agents to hypertension. Risk factors for coronary artery disease include male gender, dyslipidemia and diabetes mellitus. Past treatments include calcium channel blockers and diuretics. The current treatment provides moderate improvement. Compliance problems include psychosocial issues.  There is no history of angina or heart failure. There is no history of chronic renal disease, coarctation of the aorta, hyperaldosteronism, hypercortisolism, pheochromocytoma, renovascular disease or sleep apnea.       Past Medical History:   Diagnosis Date    Allergy     pcn     Diabetes mellitus     Diabetes mellitus, type 2     Hyperlipidemia     Hypertension      Social History     Socioeconomic History    Marital status:      Spouse name: Sharyn Torre    Number of children: 2    Years of education: Not on file    Highest education level: Not on file   Occupational History    Occupation:    Social Needs    Financial resource strain: Not on file    Food insecurity     Worry: Not on file     Inability: Not on file    Transportation needs     Medical: Not on file     Non-medical: Not on file   Tobacco Use    Smoking status: Never Smoker    Smokeless tobacco: Never Used   Substance and Sexual Activity    Alcohol use: Yes    Drug use: No    Sexual activity: Yes     Partners: Female   Lifestyle    Physical activity     Days per week: Not on file     Minutes per session: Not on file    Stress: Not at all   Relationships    Social connections     Talks on phone: Not on file     Gets together: Not on file     Attends [employer] service: Not on file     Active member of club or organization: Not on file     Attends meetings of clubs or organizations: Not on file     Relationship status: Not on file   Other Topics Concern    Not on file   Social History Narrative    Together 2 children- raised 6 total with Ms Coles     Past Surgical History:   Procedure Laterality Date    APPENDECTOMY      SPINE SURGERY       Family History   Problem Relation Age of Onset    Heart disease Father     Obesity Son        Review of Systems   Constitutional: Negative for activity change, appetite change, chills, diaphoresis, fatigue, fever, malaise/fatigue and unexpected weight change (loosing wt lost 2 lbs.).   HENT: Negative for congestion, sneezing and trouble swallowing.    Eyes: Negative for pain, redness and visual disturbance.   Respiratory: Negative for cough, chest tightness and shortness of breath.    Cardiovascular: Negative for chest pain, palpitations and  "leg swelling.        Borderline hypertension.   Gastrointestinal: Negative for abdominal distention and abdominal pain.   Endocrine: Negative for cold intolerance, heat intolerance, polydipsia and polyphagia.        Diabetes mellitus.   Genitourinary: Negative for dysuria, frequency and hematuria.   Musculoskeletal: Positive for arthralgias (hand pains). Negative for back pain, gait problem and neck pain.        Patient is status post right-sided hip arthroplasty and is recovering gradually with acceptable range of motion and quality of life.   Skin: Positive for rash (psoriasis). Negative for color change, pallor and wound.        Extensive psoriasis on the skin especially in the trunk and back.   Neurological: Negative for dizziness, seizures, speech difficulty, weakness and headaches.   Psychiatric/Behavioral: Negative for agitation, behavioral problems and confusion. The patient is not nervous/anxious.          Objective:      Blood pressure 117/74, pulse 77, temperature 97.3 °F (36.3 °C), height 5' 8" (1.727 m), weight 83 kg (183 lb). Body mass index is 27.83 kg/m².  Physical Exam  Constitutional:       General: He is not in acute distress.     Appearance: He is well-developed. He is not ill-appearing or diaphoretic.      Comments: BMI is 27.83   HENT:      Head: Normocephalic and atraumatic.      Mouth/Throat:      Comments: Patient is wearing a facial mask.  COVID-19  Eyes:      General: No scleral icterus.        Right eye: No discharge.         Left eye: No discharge.   Neck:      Thyroid: No thyromegaly.      Vascular: No JVD.      Trachea: No tracheal deviation.   Cardiovascular:      Rate and Rhythm: Normal rate and regular rhythm.      Heart sounds: Normal heart sounds. No murmur.   Pulmonary:      Effort: No respiratory distress.      Breath sounds: Normal breath sounds. No stridor.   Abdominal:      General: Bowel sounds are normal. There is no distension.      Palpations: Abdomen is soft.      " Tenderness: There is no abdominal tenderness. There is no rebound.   Musculoskeletal:         General: No tenderness or deformity.      Right foot: Normal range of motion. No deformity.      Left foot: No deformity.   Feet:      Right foot:      Protective Sensation: 6 sites tested. 6 sites sensed.      Skin integrity: No ulcer, blister, skin breakdown or erythema.      Left foot:      Protective Sensation: 6 sites tested. 6 sites sensed.      Skin integrity: No ulcer, blister, skin breakdown or erythema.   Lymphadenopathy:      Cervical: No cervical adenopathy.   Skin:     General: Skin is dry.      Findings: Rash (psoriasis) present.      Comments: Extensive psoriasis noted on the skin in the trunk and back.  Vitiligo on the upper shoulder and upper back.  This runs in the family including mother and perhaps 1 of the sisters.  Also patient's son has it.   Neurological:      Mental Status: He is alert. Mental status is at baseline.   Psychiatric:         Behavior: Behavior normal.           Assessment:       1. Type 2 diabetes mellitus without complication, without long-term current use of insulin    2. Mixed dyslipidemia    3. Essential hypertension           No visits with results within 3 Month(s) from this visit.   Latest known visit with results is:   Office Visit on 06/05/2020   Component Date Value Ref Range Status    Hemoglobin A1C 09/05/2020 8.1* 4.8 - 5.6 % Final     Patient's hemoglobin A1c is better this time.    Plan:           Type 2 diabetes mellitus without complication, without long-term current use of insulin  -     pioglitazone (ACTOS) 30 MG tablet; Take 1 tablet (30 mg total) by mouth once daily.  Dispense: 30 tablet; Refill: 3  -     Hemoglobin A1C; Future; Expected date: 12/01/2020    Mixed dyslipidemia    Essential hypertension  -     Basic metabolic panel; Future; Expected date: 12/01/2020      Patient continues to be out of insurance at this point.  I am not sure what is going on.  His  hemoglobin A1c is rising and it is greater than 8.1.  He cannot afford the cost of Trulicity.    I will put him on Actos 30 mg per day.  This will cost him about 15 dollars from [employer].  He will continue with the other medications.    It is likely that he might get Medicare very soon in the near future and I will see him for follow-up and adjust medications at that time.  Based upon his blood sugar levels also, I may consider addition of insulin if he is retiring from his  job.  Certainly your classes of branded medications like GL P 1 or SGLT to may be a consideration.    He continues to lose weight and he attributes this to his watching his diet.  Last time he had attributed this to lack of AC in his truck.  I am concerned if he has any other medical condition or a cancer which is making him lose weight.  I will try to see that he gets his colonoscopy and other preventive care measures at time.  Patient does not seem to be concerned about these issues.  Will continue with the dialogue at follow-up.      Follow-up in 3 months.            Advised Mr. Torre to monitor Blood sugars at home and record them.  Exercise, watch diet and loose weight.  keep a close eye on feet and keep them clean. Annual eye examination. Annual influenza vaccine.  Monitor HgbA1c every 3 to 6 months. Monitor urine microalbumin every year.keep LDL less than 100. Monitor blood pressure and target blood pressure 120/70.  Patient has been advised to watch diet and exercise. Avoid fried and fatty food. Compliance to medications and follow up urged.    Multiple medical conditions labs have been reviewed.  Psoriasis condition also has been reviewed.  Prescription has been given.  Perhaps he is better off seeing Dermatology.    Fup 3 month    Current Outpatient Medications:     amLODIPine (NORVASC) 10 MG tablet, Take 1 tablet by mouth once daily, Disp: 90 tablet, Rfl: 1    atorvastatin (LIPITOR) 40 MG tablet, Take 1 tablet (40 mg  total) by mouth every evening., Disp: 90 tablet, Rfl: 1    cetirizine (ZYRTEC) 10 mg Cap, Take 1 tablet by mouth once daily., Disp: , Rfl:     glipiZIDE (GLUCOTROL) 5 MG TR24, Take 1 tablet by mouth twice daily, Disp: 180 tablet, Rfl: 0    hydroCHLOROthiazide (HYDRODIURIL) 12.5 MG Tab, Take 1 tablet by mouth once daily, Disp: 90 tablet, Rfl: 1    metFORMIN (GLUCOPHAGE) 500 MG tablet, Take 2 tablets by mouth twice daily, Disp: 360 tablet, Rfl: 1    omega-3 fatty acids fish oil (OMEGA-3 2100) 1,050-1,200 mg Cap capsule, Take 1 capsule by mouth 2 (two) times daily., Disp: , Rfl:     turmeric root extract 500 mg Cap, Take 1 tablet by mouth 2 (two) times daily., Disp: , Rfl:     pioglitazone (ACTOS) 30 MG tablet, Take 1 tablet (30 mg total) by mouth once daily., Disp: 30 tablet, Rfl: 3

## 2020-12-18 ENCOUNTER — OFFICE VISIT (OUTPATIENT)
Dept: FAMILY MEDICINE | Facility: CLINIC | Age: 65
End: 2020-12-18

## 2020-12-18 VITALS
HEIGHT: 68 IN | BODY MASS INDEX: 29.1 KG/M2 | WEIGHT: 192 LBS | DIASTOLIC BLOOD PRESSURE: 77 MMHG | HEART RATE: 79 BPM | SYSTOLIC BLOOD PRESSURE: 134 MMHG | TEMPERATURE: 97 F

## 2020-12-18 DIAGNOSIS — E11.9 TYPE 2 DIABETES MELLITUS WITHOUT COMPLICATION, WITHOUT LONG-TERM CURRENT USE OF INSULIN: Primary | ICD-10-CM

## 2020-12-18 DIAGNOSIS — I10 ESSENTIAL HYPERTENSION: ICD-10-CM

## 2020-12-18 DIAGNOSIS — E78.2 MIXED DYSLIPIDEMIA: ICD-10-CM

## 2020-12-18 PROCEDURE — 99213 OFFICE O/P EST LOW 20 MIN: CPT | Mod: S$PBB,,, | Performed by: INTERNAL MEDICINE

## 2020-12-18 PROCEDURE — 99213 PR OFFICE/OUTPT VISIT, EST, LEVL III, 20-29 MIN: ICD-10-PCS | Mod: S$PBB,,, | Performed by: INTERNAL MEDICINE

## 2020-12-18 PROCEDURE — 90662 IIV NO PRSV INCREASED AG IM: CPT | Mod: PBBFAC | Performed by: INTERNAL MEDICINE

## 2020-12-18 PROCEDURE — 99213 OFFICE O/P EST LOW 20 MIN: CPT | Performed by: INTERNAL MEDICINE

## 2020-12-18 NOTE — PROGRESS NOTES
Subjective:       Patient ID: Mg Torre is a 65 y.o. male.    Chief Complaint: Diabetes, Hypertension, and Hyperlipidemia    Patient comes for follow-up.  65-year-old  male.  Underlying medical issues of type 2 diabetes mellitus, hyperlipidemia, hypertension have been noted.  He also has psoriasis and vitiligo.    Unfortunately he had to changes company to a different [employer] company.  He is in between insurances at this point.  He has known 65 now and possibly is applying for Medicare.  Unfortunately he missed the deadline for application for Medicare benefit.  It is unclear to me and even to him whether he is eligible for Medicare.  He does not know how it works.  While he can afford most of his medications which are generic, he cannot afford the injection Trulicity which is super expensive.    He has yet to his labs.    Naturally his hemoglobin A1c has gone up to 8.1    His blood pressures are doing okay.  Somehow he has lost approximately 6 lb of weight and he attributes this to lack of air conditioning in his truck as provided by the new employer.  He has further lost 2 more lb of weight which he again attributes to his careful and cautious diet now.      Thus far, given his  jobs I have been trying to avoid insulin.  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  However, today he tells me that he may look forward to senior living in a year or so.  As to what has changed financially, to seek early senior living is unclear to me and I did not press any further.      Diabetes  He presents for his follow-up diabetic visit. He has type 2 diabetes mellitus. His disease course has been stable. Pertinent negatives for hypoglycemia include no confusion, dizziness, headaches, nervousness/anxiousness, pallor, seizures or speech difficulty. Pertinent negatives for diabetes include no chest pain, no fatigue, no polydipsia, no polyphagia  and no weakness. There are no hypoglycemic complications. Risk factors for coronary artery disease include dyslipidemia, diabetes mellitus, male sex and hypertension. Current diabetic treatment includes oral agent (dual therapy) (Inj Trulicity-he could not afford it and this has been discontinued.). He is compliant with treatment some of the time. His weight is decreasing steadily. Meal planning includes avoidance of concentrated sweets. He has not had a previous visit with a dietitian. His home blood glucose trend is increasing steadily. An ACE inhibitor/angiotensin II receptor blocker is contraindicated. He does not see a podiatrist.Eye exam is not current.   Hypertension  This is a chronic problem. The current episode started more than 1 year ago. The problem is unchanged. The problem is controlled. Pertinent negatives include no chest pain, headaches, malaise/fatigue, neck pain, palpitations or shortness of breath. There are no associated agents to hypertension. Risk factors for coronary artery disease include male gender, dyslipidemia and diabetes mellitus. Past treatments include calcium channel blockers and diuretics. The current treatment provides moderate improvement. Compliance problems include psychosocial issues.  There is no history of angina or heart failure. There is no history of chronic renal disease, coarctation of the aorta, hyperaldosteronism, hypercortisolism, pheochromocytoma, renovascular disease or sleep apnea.   Hyperlipidemia  This is a chronic problem. The current episode started more than 1 year ago. He has no history of chronic renal disease, diabetes, hypothyroidism, liver disease or obesity. Pertinent negatives include no chest pain or shortness of breath. Current antihyperlipidemic treatment includes statins. The current treatment provides moderate improvement of lipids. Risk factors for coronary artery disease include hypertension, male sex, dyslipidemia, a sedentary lifestyle and  diabetes mellitus.       Past Medical History:   Diagnosis Date    Allergy     pcn    Diabetes mellitus     Diabetes mellitus, type 2     Hyperlipidemia     Hypertension      Social History     Socioeconomic History    Marital status:      Spouse name: Sharyn Nicho    Number of children: 2    Years of education: Not on file    Highest education level: Not on file   Occupational History    Occupation:    Social Needs    Financial resource strain: Not on file    Food insecurity     Worry: Not on file     Inability: Not on file    Transportation needs     Medical: Not on file     Non-medical: Not on file   Tobacco Use    Smoking status: Never Smoker    Smokeless tobacco: Never Used   Substance and Sexual Activity    Alcohol use: Yes    Drug use: No    Sexual activity: Yes     Partners: Female   Lifestyle    Physical activity     Days per week: Not on file     Minutes per session: Not on file    Stress: Not at all   Relationships    Social connections     Talks on phone: Not on file     Gets together: Not on file     Attends Nondenominational service: Not on file     Active member of club or organization: Not on file     Attends meetings of clubs or organizations: Not on file     Relationship status: Not on file   Other Topics Concern    Not on file   Social History Narrative    Together 2 children- raised 6 total with Ms Coles     Past Surgical History:   Procedure Laterality Date    APPENDECTOMY      SPINE SURGERY       Family History   Problem Relation Age of Onset    Heart disease Father     Obesity Son        Review of Systems   Constitutional: Negative for activity change, appetite change, chills, diaphoresis, fatigue, fever, malaise/fatigue and unexpected weight change (loosing wt lost 2 lbs..  Gained 12 lb.  Attributes it to the jacket and clothes.).   HENT: Negative for congestion, sneezing and trouble swallowing.    Eyes: Negative for pain, redness and visual  "disturbance.   Respiratory: Negative for cough, chest tightness and shortness of breath.    Cardiovascular: Negative for chest pain, palpitations and leg swelling.        Borderline hypertension.   Gastrointestinal: Negative for abdominal distention and abdominal pain.   Endocrine: Negative for cold intolerance, heat intolerance, polydipsia and polyphagia.        Diabetes mellitus.   Genitourinary: Negative for dysuria, frequency and hematuria.   Musculoskeletal: Positive for arthralgias (hand pains). Negative for back pain, gait problem and neck pain.        Patient is status post right-sided hip arthroplasty and is recovering gradually with acceptable range of motion and quality of life.   Skin: Positive for rash (psoriasis). Negative for color change, pallor and wound.        Extensive psoriasis on the skin especially in the trunk and back.   Neurological: Negative for dizziness, seizures, speech difficulty, weakness and headaches.   Psychiatric/Behavioral: Negative for agitation, behavioral problems and confusion. The patient is not nervous/anxious.          Objective:      Blood pressure 134/77, pulse 79, temperature 97.4 °F (36.3 °C), height 5' 8" (1.727 m), weight 87.1 kg (192 lb). Body mass index is 29.19 kg/m².  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  I also deducted1 lb from his long johns.  Physical Exam  Constitutional:       General: He is not in acute distress.     Appearance: He is well-developed. He is not ill-appearing, toxic-appearing or diaphoretic.      Comments: BMI is 29.19   HENT:      Head: Normocephalic and atraumatic.      Mouth/Throat:      Comments: Patient is wearing a facial mask.  COVID-19  Eyes:      General: No scleral icterus.        Right eye: No discharge.         Left eye: No discharge.   Neck:      Thyroid: No thyromegaly.      Vascular: No JVD.      Trachea: No tracheal deviation.   Cardiovascular:      Rate and Rhythm: Normal rate and regular rhythm.      " Heart sounds: Normal heart sounds. No murmur.   Pulmonary:      Effort: No respiratory distress.      Breath sounds: Normal breath sounds. No stridor.   Abdominal:      General: Bowel sounds are normal. There is no distension.      Palpations: Abdomen is soft.      Tenderness: There is no abdominal tenderness. There is no rebound.   Musculoskeletal:         General: No tenderness or deformity.   Lymphadenopathy:      Cervical: No cervical adenopathy.   Skin:     General: Skin is dry.      Findings: Rash (psoriasis) present.      Comments: Extensive psoriasis noted on the skin in the trunk and back.  Vitiligo on the upper shoulder and upper back.  This runs in the family including mother and perhaps 1 of the sisters.  Also patient's son has it.   Neurological:      Mental Status: He is alert. Mental status is at baseline.   Psychiatric:         Behavior: Behavior normal.           Assessment:       1. Type 2 diabetes mellitus without complication, without long-term current use of insulin    2. Mixed dyslipidemia    3. Essential hypertension           No visits with results within 3 Month(s) from this visit.   Latest known visit with results is:   Office Visit on 06/05/2020   Component Date Value Ref Range Status    Hemoglobin A1C 09/05/2020 8.1* 4.8 - 5.6 % Final     Patient's hemoglobin A1c is better this time.    Plan:           Type 2 diabetes mellitus without complication, without long-term current use of insulin  -     Basic Metabolic Panel; Future; Expected date: 12/18/2020  -     Hemoglobin A1C; Future; Expected date: 12/18/2020  -     Microalbumin/Creatinine Ratio, Urine; Future; Expected date: 12/18/2020    Mixed dyslipidemia    Essential hypertension    Other orders  -     Influenza - Quadrivalent - High Dose (65+) (PF) (IM)      Patient continues to be out of insurance at this point.  I am not sure what is going on.  His hemoglobin A1c is rising and it is greater than 8.1.  He cannot afford the cost of  Trulicity.    I had put him on Actos 30 mg per day.      He had lost weight last time but thankfully he gained the weight back again.  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    Advised Mr. Torre to monitor Blood sugars at home and record them.  Exercise, watch diet and loose weight.  keep a close eye on feet and keep them clean. Annual eye examination. Annual influenza vaccine.  Monitor HgbA1c every 3 to 6 months. Monitor urine microalbumin every year.keep LDL less than 100. Monitor blood pressure and target blood pressure 120/70.  Patient has been advised to watch diet and exercise. Avoid fried and fatty food. Compliance to medications and follow up urged.    Multiple medical conditions labs have been reviewed.  Psoriasis condition also has been reviewed.  Prescription has been given.  Perhaps he is better off seeing Dermatology.    Fup 4 month    Current Outpatient Medications:     amLODIPine (NORVASC) 10 MG tablet, Take 1 tablet by mouth once daily, Disp: 90 tablet, Rfl: 1    atorvastatin (LIPITOR) 40 MG tablet, TAKE 1 TABLET BY MOUTH ONCE DAILY IN THE EVENING, Disp: 90 tablet, Rfl: 3    cetirizine (ZYRTEC) 10 mg Cap, Take 1 tablet by mouth once daily., Disp: , Rfl:     glipiZIDE (GLUCOTROL) 5 MG TR24, Take 1 tablet by mouth twice daily, Disp: 180 tablet, Rfl: 0    hydroCHLOROthiazide (HYDRODIURIL) 12.5 MG Tab, Take 1 tablet by mouth once daily, Disp: 90 tablet, Rfl: 1    metFORMIN (GLUCOPHAGE) 500 MG tablet, Take 2 tablets by mouth twice daily, Disp: 360 tablet, Rfl: 1    omega-3 fatty acids fish oil (OMEGA-3 2100) 1,050-1,200 mg Cap capsule, Take 1 capsule by mouth 2 (two) times daily., Disp: , Rfl:     pioglitazone (ACTOS) 30 MG tablet, Take 1 tablet (30 mg total) by mouth once daily., Disp: 30 tablet, Rfl: 3    turmeric root extract 500 mg Cap, Take 1 tablet by mouth 2 (two) times daily., Disp: , Rfl:

## 2020-12-19 LAB
ALBUMIN/CREAT UR: <19 MG/G CREAT (ref 0–29)
BUN SERPL-MCNC: 14 MG/DL (ref 8–27)
BUN/CREAT SERPL: 14 (ref 10–24)
CALCIUM SERPL-MCNC: 9.4 MG/DL (ref 8.6–10.2)
CHLORIDE SERPL-SCNC: 98 MMOL/L (ref 96–106)
CO2 SERPL-SCNC: 25 MMOL/L (ref 20–29)
CREAT SERPL-MCNC: 1 MG/DL (ref 0.76–1.27)
CREAT UR-MCNC: 16.1 MG/DL
GLUCOSE SERPL-MCNC: 219 MG/DL (ref 65–99)
HBA1C MFR BLD: 8.2 % (ref 4.8–5.6)
MICROALBUMIN UR-MCNC: <3 UG/ML
POTASSIUM SERPL-SCNC: 4.9 MMOL/L (ref 3.5–5.2)
SODIUM SERPL-SCNC: 134 MMOL/L (ref 134–144)

## 2020-12-30 ENCOUNTER — TELEPHONE (OUTPATIENT)
Dept: FAMILY MEDICINE | Facility: CLINIC | Age: 65
End: 2020-12-30

## 2020-12-30 DIAGNOSIS — E11.9 TYPE 2 DIABETES MELLITUS WITHOUT COMPLICATION, WITHOUT LONG-TERM CURRENT USE OF INSULIN: Primary | ICD-10-CM

## 2021-01-04 ENCOUNTER — TELEPHONE (OUTPATIENT)
Dept: FAMILY MEDICINE | Facility: CLINIC | Age: 66
End: 2021-01-04

## 2021-01-11 RX ORDER — DULAGLUTIDE 1.5 MG/.5ML
1.5 INJECTION, SOLUTION SUBCUTANEOUS WEEKLY
Qty: 4 PEN | Refills: 5 | Status: SHIPPED | OUTPATIENT
Start: 2021-01-11 | End: 2021-07-26

## 2021-01-13 DIAGNOSIS — E78.2 MULTIPLE-TYPE HYPERLIPIDEMIA: ICD-10-CM

## 2021-01-13 RX ORDER — ATORVASTATIN CALCIUM 40 MG/1
40 TABLET, FILM COATED ORAL NIGHTLY
Qty: 90 TABLET | Refills: 3 | Status: SHIPPED | OUTPATIENT
Start: 2021-01-13 | End: 2022-01-31

## 2021-02-22 DIAGNOSIS — E11.9 TYPE 2 DIABETES MELLITUS WITHOUT COMPLICATION, WITHOUT LONG-TERM CURRENT USE OF INSULIN: ICD-10-CM

## 2021-02-22 RX ORDER — GLIPIZIDE 5 MG/1
5 TABLET, FILM COATED, EXTENDED RELEASE ORAL 2 TIMES DAILY
Qty: 180 TABLET | Refills: 3 | Status: SHIPPED | OUTPATIENT
Start: 2021-02-22 | End: 2022-03-14

## 2021-02-28 LAB
ALBUMIN/CREAT UR: <9 MG/G CREAT (ref 0–29)
BUN SERPL-MCNC: 10 MG/DL (ref 8–27)
BUN/CREAT SERPL: 10 (ref 10–24)
CALCIUM SERPL-MCNC: 9.5 MG/DL (ref 8.6–10.2)
CHLORIDE SERPL-SCNC: 95 MMOL/L (ref 96–106)
CO2 SERPL-SCNC: 22 MMOL/L (ref 20–29)
CREAT SERPL-MCNC: 0.98 MG/DL (ref 0.76–1.27)
CREAT UR-MCNC: 31.6 MG/DL
GLUCOSE SERPL-MCNC: 159 MG/DL (ref 65–99)
HBA1C MFR BLD: 6.9 % (ref 4.8–5.6)
MICROALBUMIN UR-MCNC: <3 UG/ML
POTASSIUM SERPL-SCNC: 5 MMOL/L (ref 3.5–5.2)
SODIUM SERPL-SCNC: 132 MMOL/L (ref 134–144)

## 2021-03-15 ENCOUNTER — OCCUPATIONAL HEALTH (OUTPATIENT)
Dept: URGENT CARE | Facility: CLINIC | Age: 66
End: 2021-03-15

## 2021-03-15 DIAGNOSIS — Z02.89 ENCOUNTER FOR EXAMINATION REQUIRED BY DEPARTMENT OF TRANSPORTATION (DOT): Primary | ICD-10-CM

## 2021-03-15 PROCEDURE — 99499 UNLISTED E&M SERVICE: CPT | Mod: S$GLB,,, | Performed by: EMERGENCY MEDICINE

## 2021-03-15 PROCEDURE — 99499 PHYSICAL, RECERT DOT/CDL: ICD-10-PCS | Mod: S$GLB,,, | Performed by: EMERGENCY MEDICINE

## 2021-04-23 ENCOUNTER — OFFICE VISIT (OUTPATIENT)
Dept: FAMILY MEDICINE | Facility: CLINIC | Age: 66
End: 2021-04-23
Payer: COMMERCIAL

## 2021-04-23 VITALS
SYSTOLIC BLOOD PRESSURE: 134 MMHG | WEIGHT: 193.63 LBS | OXYGEN SATURATION: 98 % | HEIGHT: 68 IN | DIASTOLIC BLOOD PRESSURE: 77 MMHG | BODY MASS INDEX: 29.35 KG/M2 | HEART RATE: 86 BPM

## 2021-04-23 DIAGNOSIS — E11.9 TYPE 2 DIABETES MELLITUS WITHOUT COMPLICATION, WITHOUT LONG-TERM CURRENT USE OF INSULIN: Primary | ICD-10-CM

## 2021-04-23 DIAGNOSIS — E78.2 MIXED DYSLIPIDEMIA: ICD-10-CM

## 2021-04-23 DIAGNOSIS — I10 ESSENTIAL HYPERTENSION: ICD-10-CM

## 2021-04-23 PROCEDURE — 99213 OFFICE O/P EST LOW 20 MIN: CPT | Mod: S$GLB,,, | Performed by: INTERNAL MEDICINE

## 2021-04-23 PROCEDURE — 1101F PT FALLS ASSESS-DOCD LE1/YR: CPT | Mod: S$GLB,,, | Performed by: INTERNAL MEDICINE

## 2021-04-23 PROCEDURE — 3008F PR BODY MASS INDEX (BMI) DOCUMENTED: ICD-10-PCS | Mod: S$GLB,,, | Performed by: INTERNAL MEDICINE

## 2021-04-23 PROCEDURE — 1159F MED LIST DOCD IN RCRD: CPT | Mod: S$GLB,,, | Performed by: INTERNAL MEDICINE

## 2021-04-23 PROCEDURE — 3075F PR MOST RECENT SYSTOLIC BLOOD PRESS GE 130-139MM HG: ICD-10-PCS | Mod: S$GLB,,, | Performed by: INTERNAL MEDICINE

## 2021-04-23 PROCEDURE — 1170F PR FUNCTIONAL STATUS ASSESSED: ICD-10-PCS | Mod: S$GLB,,, | Performed by: INTERNAL MEDICINE

## 2021-04-23 PROCEDURE — 1170F FXNL STATUS ASSESSED: CPT | Mod: S$GLB,,, | Performed by: INTERNAL MEDICINE

## 2021-04-23 PROCEDURE — 3078F PR MOST RECENT DIASTOLIC BLOOD PRESSURE < 80 MM HG: ICD-10-PCS | Mod: S$GLB,,, | Performed by: INTERNAL MEDICINE

## 2021-04-23 PROCEDURE — 3288F FALL RISK ASSESSMENT DOCD: CPT | Mod: S$GLB,,, | Performed by: INTERNAL MEDICINE

## 2021-04-23 PROCEDURE — 1159F PR MEDICATION LIST DOCUMENTED IN MEDICAL RECORD: ICD-10-PCS | Mod: S$GLB,,, | Performed by: INTERNAL MEDICINE

## 2021-04-23 PROCEDURE — 3008F BODY MASS INDEX DOCD: CPT | Mod: S$GLB,,, | Performed by: INTERNAL MEDICINE

## 2021-04-23 PROCEDURE — 99213 PR OFFICE/OUTPT VISIT, EST, LEVL III, 20-29 MIN: ICD-10-PCS | Mod: S$GLB,,, | Performed by: INTERNAL MEDICINE

## 2021-04-23 PROCEDURE — 3288F PR FALLS RISK ASSESSMENT DOCUMENTED: ICD-10-PCS | Mod: S$GLB,,, | Performed by: INTERNAL MEDICINE

## 2021-04-23 PROCEDURE — 3075F SYST BP GE 130 - 139MM HG: CPT | Mod: S$GLB,,, | Performed by: INTERNAL MEDICINE

## 2021-04-23 PROCEDURE — 3078F DIAST BP <80 MM HG: CPT | Mod: S$GLB,,, | Performed by: INTERNAL MEDICINE

## 2021-04-23 PROCEDURE — 1101F PR PT FALLS ASSESS DOC 0-1 FALLS W/OUT INJ PAST YR: ICD-10-PCS | Mod: S$GLB,,, | Performed by: INTERNAL MEDICINE

## 2021-08-13 PROBLEM — U07.1 LAB TEST POSITIVE FOR DETECTION OF COVID-19 VIRUS: Status: ACTIVE | Noted: 2021-08-10

## 2021-10-02 LAB
ALBUMIN SERPL-MCNC: 4.7 G/DL (ref 3.8–4.8)
ALBUMIN/GLOB SERPL: 2 {RATIO} (ref 1.2–2.2)
ALP SERPL-CCNC: 78 IU/L (ref 44–121)
ALT SERPL-CCNC: 25 IU/L (ref 0–44)
AST SERPL-CCNC: 21 IU/L (ref 0–40)
BILIRUB SERPL-MCNC: 0.5 MG/DL (ref 0–1.2)
BUN SERPL-MCNC: 12 MG/DL (ref 8–27)
BUN/CREAT SERPL: 10 (ref 10–24)
CALCIUM SERPL-MCNC: 10.1 MG/DL (ref 8.6–10.2)
CHLORIDE SERPL-SCNC: 99 MMOL/L (ref 96–106)
CHOLEST SERPL-MCNC: 137 MG/DL (ref 100–199)
CO2 SERPL-SCNC: 27 MMOL/L (ref 20–29)
CREAT SERPL-MCNC: 1.16 MG/DL (ref 0.76–1.27)
GLOBULIN SER CALC-MCNC: 2.4 G/DL (ref 1.5–4.5)
GLUCOSE SERPL-MCNC: 119 MG/DL (ref 65–99)
HBA1C MFR BLD: 6.3 % (ref 4.8–5.6)
HDLC SERPL-MCNC: 42 MG/DL
LDLC SERPL CALC-MCNC: 76 MG/DL (ref 0–99)
POTASSIUM SERPL-SCNC: 6.2 MMOL/L (ref 3.5–5.2)
PROT SERPL-MCNC: 7.1 G/DL (ref 6–8.5)
SODIUM SERPL-SCNC: 138 MMOL/L (ref 134–144)
TRIGL SERPL-MCNC: 100 MG/DL (ref 0–149)
VLDLC SERPL CALC-MCNC: 19 MG/DL (ref 5–40)

## 2021-10-08 ENCOUNTER — OFFICE VISIT (OUTPATIENT)
Dept: FAMILY MEDICINE | Facility: CLINIC | Age: 66
End: 2021-10-08
Payer: COMMERCIAL

## 2021-10-08 VITALS
WEIGHT: 191 LBS | DIASTOLIC BLOOD PRESSURE: 81 MMHG | HEART RATE: 90 BPM | BODY MASS INDEX: 28.95 KG/M2 | TEMPERATURE: 99 F | SYSTOLIC BLOOD PRESSURE: 128 MMHG | HEIGHT: 68 IN

## 2021-10-08 DIAGNOSIS — E78.2 MIXED DYSLIPIDEMIA: ICD-10-CM

## 2021-10-08 DIAGNOSIS — I10 ESSENTIAL HYPERTENSION: ICD-10-CM

## 2021-10-08 DIAGNOSIS — E11.9 TYPE 2 DIABETES MELLITUS WITHOUT COMPLICATION, WITHOUT LONG-TERM CURRENT USE OF INSULIN: Primary | ICD-10-CM

## 2021-10-08 DIAGNOSIS — Z23 NEED FOR INFLUENZA VACCINATION: ICD-10-CM

## 2021-10-08 PROCEDURE — 90662 FLU VACCINE - QUADRIVALENT - HIGH DOSE (65+) PRESERVATIVE FREE IM: ICD-10-PCS | Mod: S$GLB,,, | Performed by: INTERNAL MEDICINE

## 2021-10-08 PROCEDURE — G0008 ADMIN INFLUENZA VIRUS VAC: HCPCS | Mod: S$GLB,,, | Performed by: INTERNAL MEDICINE

## 2021-10-08 PROCEDURE — G0008 FLU VACCINE - QUADRIVALENT - HIGH DOSE (65+) PRESERVATIVE FREE IM: ICD-10-PCS | Mod: S$GLB,,, | Performed by: INTERNAL MEDICINE

## 2021-10-08 PROCEDURE — 99214 OFFICE O/P EST MOD 30 MIN: CPT | Mod: 25,S$GLB,, | Performed by: INTERNAL MEDICINE

## 2021-10-08 PROCEDURE — 99214 PR OFFICE/OUTPT VISIT, EST, LEVL IV, 30-39 MIN: ICD-10-PCS | Mod: 25,S$GLB,, | Performed by: INTERNAL MEDICINE

## 2021-10-08 PROCEDURE — 90662 IIV NO PRSV INCREASED AG IM: CPT | Mod: S$GLB,,, | Performed by: INTERNAL MEDICINE

## 2021-10-08 RX ORDER — NAPROXEN SODIUM 220 MG/1
1 TABLET, FILM COATED ORAL DAILY
COMMUNITY

## 2021-12-21 ENCOUNTER — TELEPHONE (OUTPATIENT)
Dept: FAMILY MEDICINE | Facility: CLINIC | Age: 66
End: 2021-12-21
Payer: COMMERCIAL

## 2022-02-20 LAB
ALBUMIN/CREAT UR: 14 MG/G CREAT (ref 0–29)
BUN SERPL-MCNC: 13 MG/DL (ref 8–27)
BUN/CREAT SERPL: 12 (ref 10–24)
CALCIUM SERPL-MCNC: 10.1 MG/DL (ref 8.6–10.2)
CHLORIDE SERPL-SCNC: 94 MMOL/L (ref 96–106)
CO2 SERPL-SCNC: 21 MMOL/L (ref 20–29)
CREAT SERPL-MCNC: 1.08 MG/DL (ref 0.76–1.27)
CREAT UR-MCNC: 134.5 MG/DL
GLUCOSE SERPL-MCNC: 172 MG/DL (ref 65–99)
HBA1C MFR BLD: 8.1 % (ref 4.8–5.6)
MICROALBUMIN UR-MCNC: 18.8 UG/ML
POTASSIUM SERPL-SCNC: 5.2 MMOL/L (ref 3.5–5.2)
SODIUM SERPL-SCNC: 133 MMOL/L (ref 134–144)

## 2022-02-24 ENCOUNTER — OFFICE VISIT (OUTPATIENT)
Dept: FAMILY MEDICINE | Facility: CLINIC | Age: 67
End: 2022-02-24
Payer: COMMERCIAL

## 2022-02-24 VITALS
WEIGHT: 197 LBS | DIASTOLIC BLOOD PRESSURE: 75 MMHG | BODY MASS INDEX: 29.86 KG/M2 | HEIGHT: 68 IN | SYSTOLIC BLOOD PRESSURE: 119 MMHG | HEART RATE: 86 BPM

## 2022-02-24 DIAGNOSIS — Z86.16 HISTORY OF COVID-19: ICD-10-CM

## 2022-02-24 DIAGNOSIS — L40.8 OTHER PSORIASIS: Chronic | ICD-10-CM

## 2022-02-24 DIAGNOSIS — E78.2 MIXED DYSLIPIDEMIA: Chronic | ICD-10-CM

## 2022-02-24 DIAGNOSIS — E11.9 TYPE 2 DIABETES MELLITUS WITHOUT COMPLICATION, WITHOUT LONG-TERM CURRENT USE OF INSULIN: Primary | Chronic | ICD-10-CM

## 2022-02-24 DIAGNOSIS — I10 ESSENTIAL HYPERTENSION: Chronic | ICD-10-CM

## 2022-02-24 DIAGNOSIS — Z23 NEED FOR VACCINATION AGAINST STREPTOCOCCUS PNEUMONIAE USING PNEUMOCOCCAL CONJUGATE VACCINE 13: ICD-10-CM

## 2022-02-24 PROCEDURE — 3288F PR FALLS RISK ASSESSMENT DOCUMENTED: ICD-10-PCS | Mod: S$GLB,,, | Performed by: INTERNAL MEDICINE

## 2022-02-24 PROCEDURE — 3061F NEG MICROALBUMINURIA REV: CPT | Mod: S$GLB,,, | Performed by: INTERNAL MEDICINE

## 2022-02-24 PROCEDURE — 1101F PR PT FALLS ASSESS DOC 0-1 FALLS W/OUT INJ PAST YR: ICD-10-PCS | Mod: S$GLB,,, | Performed by: INTERNAL MEDICINE

## 2022-02-24 PROCEDURE — 1159F MED LIST DOCD IN RCRD: CPT | Mod: S$GLB,,, | Performed by: INTERNAL MEDICINE

## 2022-02-24 PROCEDURE — 3078F PR MOST RECENT DIASTOLIC BLOOD PRESSURE < 80 MM HG: ICD-10-PCS | Mod: S$GLB,,, | Performed by: INTERNAL MEDICINE

## 2022-02-24 PROCEDURE — 1160F RVW MEDS BY RX/DR IN RCRD: CPT | Mod: S$GLB,,, | Performed by: INTERNAL MEDICINE

## 2022-02-24 PROCEDURE — 99214 PR OFFICE/OUTPT VISIT, EST, LEVL IV, 30-39 MIN: ICD-10-PCS | Mod: 25,S$GLB,, | Performed by: INTERNAL MEDICINE

## 2022-02-24 PROCEDURE — G0009 PNEUMOCOCCAL CONJUGATE VACCINE 13-VALENT LESS THAN 5YO & GREATER THAN: ICD-10-PCS | Mod: S$GLB,,, | Performed by: INTERNAL MEDICINE

## 2022-02-24 PROCEDURE — 1160F PR REVIEW ALL MEDS BY PRESCRIBER/CLIN PHARMACIST DOCUMENTED: ICD-10-PCS | Mod: S$GLB,,, | Performed by: INTERNAL MEDICINE

## 2022-02-24 PROCEDURE — 99214 OFFICE O/P EST MOD 30 MIN: CPT | Mod: 25,S$GLB,, | Performed by: INTERNAL MEDICINE

## 2022-02-24 PROCEDURE — 3078F DIAST BP <80 MM HG: CPT | Mod: S$GLB,,, | Performed by: INTERNAL MEDICINE

## 2022-02-24 PROCEDURE — 1159F PR MEDICATION LIST DOCUMENTED IN MEDICAL RECORD: ICD-10-PCS | Mod: S$GLB,,, | Performed by: INTERNAL MEDICINE

## 2022-02-24 PROCEDURE — 90670 PCV13 VACCINE IM: CPT | Mod: S$GLB,,, | Performed by: INTERNAL MEDICINE

## 2022-02-24 PROCEDURE — 3066F PR DOCUMENTATION OF TREATMENT FOR NEPHROPATHY: ICD-10-PCS | Mod: S$GLB,,, | Performed by: INTERNAL MEDICINE

## 2022-02-24 PROCEDURE — 3052F PR MOST RECENT HEMOGLOBIN A1C LEVEL 8.0 - < 9.0%: ICD-10-PCS | Mod: S$GLB,,, | Performed by: INTERNAL MEDICINE

## 2022-02-24 PROCEDURE — 3066F NEPHROPATHY DOC TX: CPT | Mod: S$GLB,,, | Performed by: INTERNAL MEDICINE

## 2022-02-24 PROCEDURE — 1125F AMNT PAIN NOTED PAIN PRSNT: CPT | Mod: S$GLB,,, | Performed by: INTERNAL MEDICINE

## 2022-02-24 PROCEDURE — 3061F PR NEG MICROALBUMINURIA RESULT DOCUMENTED/REVIEW: ICD-10-PCS | Mod: S$GLB,,, | Performed by: INTERNAL MEDICINE

## 2022-02-24 PROCEDURE — 3052F HG A1C>EQUAL 8.0%<EQUAL 9.0%: CPT | Mod: S$GLB,,, | Performed by: INTERNAL MEDICINE

## 2022-02-24 PROCEDURE — 3074F SYST BP LT 130 MM HG: CPT | Mod: S$GLB,,, | Performed by: INTERNAL MEDICINE

## 2022-02-24 PROCEDURE — 3008F BODY MASS INDEX DOCD: CPT | Mod: S$GLB,,, | Performed by: INTERNAL MEDICINE

## 2022-02-24 PROCEDURE — 1101F PT FALLS ASSESS-DOCD LE1/YR: CPT | Mod: S$GLB,,, | Performed by: INTERNAL MEDICINE

## 2022-02-24 PROCEDURE — 1125F PR PAIN SEVERITY QUANTIFIED, PAIN PRESENT: ICD-10-PCS | Mod: S$GLB,,, | Performed by: INTERNAL MEDICINE

## 2022-02-24 PROCEDURE — 3008F PR BODY MASS INDEX (BMI) DOCUMENTED: ICD-10-PCS | Mod: S$GLB,,, | Performed by: INTERNAL MEDICINE

## 2022-02-24 PROCEDURE — 3074F PR MOST RECENT SYSTOLIC BLOOD PRESSURE < 130 MM HG: ICD-10-PCS | Mod: S$GLB,,, | Performed by: INTERNAL MEDICINE

## 2022-02-24 PROCEDURE — G0009 ADMIN PNEUMOCOCCAL VACCINE: HCPCS | Mod: S$GLB,,, | Performed by: INTERNAL MEDICINE

## 2022-02-24 PROCEDURE — 3288F FALL RISK ASSESSMENT DOCD: CPT | Mod: S$GLB,,, | Performed by: INTERNAL MEDICINE

## 2022-02-24 PROCEDURE — 90670 PNEUMOCOCCAL CONJUGATE VACCINE 13-VALENT LESS THAN 5YO & GREATER THAN: ICD-10-PCS | Mod: S$GLB,,, | Performed by: INTERNAL MEDICINE

## 2022-02-24 NOTE — PROGRESS NOTES
Subjective:       Patient ID: Mg Torre is a 66 y.o. male.    Chief Complaint: Diabetes (Lab review ), Hypertension, Leg Pain, Psoriasis, and History Of Covid    Patient is a 66-year-old male who comes for follow-up.  Joined [facility] at this point.    Underlying medical issues are as below:-    1. Type 2 diabetes mellitus without complication, without long-term current use of insulin   2. Mixed dyslipidemia   3. Essential hypertension   4. Other psoriasis   5. History of COVID-19     Diabetes control has been somewhat fluctuant over years with some good patches and mostly bad patches.  Last hemoglobin A1c 4 months ago was the best at 6.3 and recently it has climbed up to 8.1.    Blood glucose is 172 with a creatinine of 1.08, BUN 13 and estimated GFR of 71. Serum sodium is 133. Potassium 5.2 and previously it was 6.2.  Calcium is 10.1.  Urine microalbumin is negative.      He continues to work as a  and rules preclude from using insulin.    Currently he is on injection Trulicity at 1.5 and metformin for control of his diabetes.    Psoriasis is extensive on the skin and has affected him.  Ordinary treatments are not working and he does not have the resources and financial muster for  getting extensive treatment for the same.  While a referral was made in past, he did not have the time and inclination because of his elier chop.      Diabetes  He presents for his follow-up diabetic visit. He has type 2 diabetes mellitus. No MedicAlert identification noted. His disease course has been worsening (Recent hemoglobin A1c has climbed up greater than 8. ). Pertinent negatives for hypoglycemia include no confusion, dizziness, headaches, nervousness/anxiousness, pallor, seizures or speech difficulty. Pertinent negatives for diabetes include no chest pain, no fatigue, no polydipsia, no polyphagia, no polyuria and no weakness. There are no hypoglycemic complications. Pertinent negatives for diabetic  complications include no autonomic neuropathy, CVA or PVD. Risk factors for coronary artery disease include dyslipidemia, diabetes mellitus, male sex, hypertension and sedentary lifestyle. Current diabetic treatment includes oral agent (dual therapy) (Recently changed insurance and he had a gap between feeling for Trulicity..). He is compliant with treatment some of the time. His weight is increasing steadily (191-197 lbs). Meal planning includes avoidance of concentrated sweets. He has not had a previous visit with a dietitian. His home blood glucose trend is increasing steadily. His breakfast blood glucose range is generally 140-180 mg/dl. An ACE inhibitor/angiotensin II receptor blocker is contraindicated (Intolerant to Ace and ARB). He does not see a podiatrist.Eye exam is not current.   Hypertension  This is a chronic problem. The current episode started more than 1 year ago. The problem is unchanged. The problem is controlled. Pertinent negatives include no chest pain, headaches, malaise/fatigue, neck pain, palpitations or shortness of breath. There are no associated agents to hypertension. Risk factors for coronary artery disease include male gender, dyslipidemia and diabetes mellitus. Past treatments include calcium channel blockers and diuretics. The current treatment provides moderate improvement. Compliance problems include psychosocial issues.  There is no history of angina, CVA, heart failure or PVD. There is no history of chronic renal disease, coarctation of the aorta, hyperaldosteronism, hypercortisolism, pheochromocytoma, renovascular disease or sleep apnea.   Hyperlipidemia  This is a chronic problem. The current episode started more than 1 year ago. He has no history of chronic renal disease, diabetes, hypothyroidism, liver disease or obesity. Pertinent negatives include no chest pain or shortness of breath. Current antihyperlipidemic treatment includes statins. The current treatment provides  moderate improvement of lipids. Compliance problems include psychosocial issues.  Risk factors for coronary artery disease include hypertension, male sex, dyslipidemia, a sedentary lifestyle and diabetes mellitus.   Psoriasis has been noted.  No treatment thus far.  Will make a referral to Dermatology.  This is extensive and occupies different portions of the body.    Sinus allergies and have been noted for which he takes cetirizine over-the-counter.    Past Medical History:   Diagnosis Date    Allergy     pcn    Diabetes mellitus     Diabetes mellitus, type 2     Hyperlipidemia     Hypertension     Lab test positive for detection of COVID-19 virus 8/10/2021    Patient had tested himself at urgent care in [facility].  Minimally symptomatic at this point.  Continue with precautions.  Patient's son is positive with significant problems.     Social History     Socioeconomic History    Marital status:      Spouse name: Sharyn Torre    Number of children: 2   Occupational History    Occupation:    Tobacco Use    Smoking status: Never Smoker    Smokeless tobacco: Never Used   Substance and Sexual Activity    Alcohol use: Yes    Drug use: No    Sexual activity: Yes     Partners: Female   Social History Narrative    Together 2 children- raised 6 total with Ms Coles     Social Determinants of Health     Financial Resource Strain: Medium Risk    Difficulty of Paying Living Expenses: Somewhat hard   Food Insecurity: No Food Insecurity    Worried About Running Out of Food in the Last Year: Never true    Ran Out of Food in the Last Year: Never true   Transportation Needs: No Transportation Needs    Lack of Transportation (Medical): No    Lack of Transportation (Non-Medical): No   Physical Activity: Inactive    Days of Exercise per Week: 0 days    Minutes of Exercise per Session: 0 min   Stress: Stress Concern Present    Feeling of Stress : To some extent   Social  Connections: Moderately Isolated    Frequency of Communication with Friends and Family: Three times a week    Frequency of Social Gatherings with Friends and Family: Three times a week    Attends Caodaism Services: Never    Active Member of Clubs or Organizations: No    Attends Club or Organization Meetings: Never    Marital Status:    Housing Stability: Low Risk     Unable to Pay for Housing in the Last Year: No    Number of Places Lived in the Last Year: 1    Unstable Housing in the Last Year: No     Past Surgical History:   Procedure Laterality Date    APPENDECTOMY      SPINE SURGERY       Family History   Problem Relation Age of Onset    Heart disease Father     Obesity Son        Review of Systems   Constitutional: Negative for activity change, appetite change, chills, diaphoresis, fatigue, fever, malaise/fatigue and unexpected weight change.   HENT: Negative for congestion, hearing loss, rhinorrhea, sneezing and trouble swallowing.         Sinus problems.   Eyes: Negative for pain, discharge, redness and visual disturbance.   Respiratory: Negative for cough, chest tightness, shortness of breath and wheezing.    Cardiovascular: Negative for chest pain, palpitations and leg swelling.        Borderline hypertension.   Gastrointestinal: Negative for abdominal distention, abdominal pain, blood in stool, constipation, diarrhea and vomiting.   Endocrine: Negative for cold intolerance, heat intolerance, polydipsia, polyphagia and polyuria.        Diabetes mellitus.  Showing worsening with recent hemoglobin A1c of > 8.0   Genitourinary: Negative for difficulty urinating, dysuria, frequency, hematuria and urgency.        No significant prostate symptoms   Musculoskeletal: Positive for arthralgias (hand pains). Negative for back pain, gait problem, joint swelling and neck pain.        Patient is status post right-sided hip arthroplasty and is recovering gradually with acceptable range of motion and  "quality of life.   Skin: Positive for rash (psoriasis). Negative for color change, pallor and wound.        Extensive psoriasis on the skin especially in the trunk and back.  Also has spots of vitiligo on the back and also on the hands..   Neurological: Negative for dizziness, seizures, speech difficulty, weakness, numbness and headaches.        Last year he had complained of tingling and numbness in the left hand and he is doing okay now.  Probably it was due to prolonged .   Psychiatric/Behavioral: Negative for agitation, behavioral problems, confusion and dysphoric mood. The patient is not nervous/anxious.          Objective:      Blood pressure 119/75, pulse 86, height 5' 8" (1.727 m), weight 89.4 kg (197 lb). Body mass index is 29.95 kg/m².  Physical Exam  Constitutional:       General: He is not in acute distress.     Appearance: He is well-developed. He is not ill-appearing, toxic-appearing or diaphoretic.      Comments: BMI is 29.95   HENT:      Head: Normocephalic and atraumatic.      Mouth/Throat:      Comments: Patient is wearing a facial mask.  COVID-19  Eyes:      General: No scleral icterus.        Right eye: No discharge.         Left eye: No discharge.   Neck:      Thyroid: No thyromegaly.      Vascular: No JVD.      Trachea: No tracheal deviation.   Cardiovascular:      Rate and Rhythm: Normal rate and regular rhythm.      Heart sounds: Normal heart sounds. No murmur heard.  Pulmonary:      Effort: No respiratory distress.      Breath sounds: Normal breath sounds. No stridor.   Abdominal:      General: Bowel sounds are normal. There is no distension.      Palpations: Abdomen is soft.      Tenderness: There is no abdominal tenderness. There is no rebound.   Musculoskeletal:         General: No tenderness or deformity.      Cervical back: No tenderness.      Right lower leg: No edema.      Left lower leg: No edema.        Feet:    Feet:      Right foot:      Protective Sensation: 5 sites " tested. 5 sites sensed.      Skin integrity: Callus present.      Toenail Condition: Right toenails are normal.      Left foot:      Protective Sensation: 5 sites tested. 5 sites sensed.      Skin integrity: Callus present.      Toenail Condition: Left toenails are normal.      Comments: Calluses  Lymphadenopathy:      Cervical: No cervical adenopathy.   Skin:     General: Skin is dry.      Findings: Rash (psoriasis) present.      Comments: Extensive psoriasis noted on the skin in the trunk and back.  Vitiligo on the upper shoulder and upper back.  This runs in the family including mother and perhaps 1 of the sisters.  Also patient's son has it.   Neurological:      Mental Status: He is alert. Mental status is at baseline.   Psychiatric:         Behavior: Behavior normal.           Assessment:       1. Type 2 diabetes mellitus without complication, without long-term current use of insulin    2. Mixed dyslipidemia    3. Essential hypertension    4. Other psoriasis    5. History of COVID-19    6. Need for vaccination against Streptococcus pneumoniae using pneumococcal conjugate vaccine 13           No visits with results within 3 Month(s) from this visit.   Latest known visit with results is:   Office Visit on 10/08/2021   Component Date Value Ref Range Status    Hemoglobin A1c 02/19/2022 8.1 (A) 4.8 - 5.6 % Final    Glucose 02/19/2022 172 (A) 65 - 99 mg/dL Final    BUN 02/19/2022 13  8 - 27 mg/dL Final    Creatinine 02/19/2022 1.08  0.76 - 1.27 mg/dL Final    eGFR if non  02/19/2022 71  >59 mL/min/1.73 Final    eGFR if  02/19/2022 82  >59 mL/min/1.73 Final    BUN/Creatinine Ratio 02/19/2022 12  10 - 24 Final    Sodium 02/19/2022 133 (A) 134 - 144 mmol/L Final    Potassium 02/19/2022 5.2  3.5 - 5.2 mmol/L Final    Chloride 02/19/2022 94 (A) 96 - 106 mmol/L Final    CO2 02/19/2022 21  20 - 29 mmol/L Final    Calcium 02/19/2022 10.1  8.6 - 10.2 mg/dL Final    Creatinine,  Urine 02/19/2022 134.5  Not Estab. mg/dL Final    Microalb, Ur 02/19/2022 18.8  Not Estab. ug/mL Final    Microalb/Crt. Ratio 02/19/2022 14  0 - 29 mg/g creat Final         Plan:           Type 2 diabetes mellitus without complication, without long-term current use of insulin  Comments:  Patient's hemoglobin A1c has gone greater than 8 but he was off Trulicity.  He has resumed it and I am not going to make any change.  Check A1c in 3 months  Orders:  -     Hemoglobin A1C; Future; Expected date: 05/23/2022    Mixed dyslipidemia  Comments:  Patient is taking his lipid lowering therapy.  Atorvastatin 40 mg at bedtime.  No side effects.    Essential hypertension  Comments:  Blood pressures under control with amlodipine 10 mg.    Other psoriasis  Comments:  Referral dermatology subject coverage.  Orders:  -     Ambulatory referral/consult to Dermatology; Future; Expected date: 03/03/2022    History of COVID-19  Comments:  Was not very sick.  He has completed his positions.    Need for vaccination against Streptococcus pneumoniae using pneumococcal conjugate vaccine 13  Comments:  Will start the pneumonia series of vaccinations by offer him Prevnar 13 and 1 year time pneumococcal 23. Side effects of muscle aches mild fever  Orders:  -     (In Office Administered) Pneumococcal Conjugate Vaccine (13 Valent) (IM)    Patient's hemoglobin A1c went up to 8.  She has changed his insurance to [facility] and probably at the initial phase, there was some issue about coverage of medications and he was out of medications for some period of time.    His resumed is Trulicity from the January and his sugars have started coming down again.    He cannot tolerate ARB and perhaps Ace.    He has extensive psoriasis on the body and this will need address all.  Last time we had sent a referral, he could not keep the appointment because he was busy with this  job.  Now that he has retired, he has time for his medical  needs.      Advised Mr. Torre about age and season appropriate immunizations/ cancer screenings.  Also seasonal influenza vaccine, update on tetanus diphtheria vaccination every 10 years.        Patient has been advised to watch diet and exercise. Avoid fried and fatty food. Compliance to medications and follow up urged.    Please utilize precautions for current COVID-19 pandemic.  Try to avoid crowds or close contact with multiple people.  Minimize outside interaction.  Wash hands with soap for  frequently upon contact.Use face mask or cover.    Advised Mr. Torre to monitor Blood sugars at home and record them.  Exercise, watch diet and loose weight.  keep a close eye on feet and keep them clean. Annual eye examination. Annual influenza vaccine.  Monitor HgbA1c every 3 to 6 months. Monitor urine microalbumin every year.keep LDL less than 100. Monitor blood pressure and target blood pressure 120/70.    Spent julee 30 minutes with patient which involved review of pts medical conditions, labs, medications and with 50% of time face-to-face discussion about medical problems, management and any applicable changes.                Current Outpatient Medications:     amLODIPine (NORVASC) 10 MG tablet, TAKE ONE TABLET BY MOUTH EVERY DAY, Disp: 90 tablet, Rfl: 1    aspirin 81 MG Chew, 1 tablet once daily., Disp: , Rfl:     atorvastatin (LIPITOR) 40 MG tablet, TAKE ONE TABLET BY MOUTH EVERY MORNING, Disp: 90 tablet, Rfl: 3    cetirizine 10 mg Cap, Take 1 tablet by mouth once daily., Disp: , Rfl:     glipiZIDE (GLUCOTROL) 5 MG TR24, Take 1 tablet (5 mg total) by mouth 2 (two) times daily., Disp: 180 tablet, Rfl: 3    hydroCHLOROthiazide (MICROZIDE) 12.5 mg capsule, TAKE ONE CAPSULE BY MOUTH EVERY DAY, Disp: 90 capsule, Rfl: 3    metFORMIN (GLUCOPHAGE) 500 MG tablet, TAKE TWO TABLETS BY MOUTH TWICE DAILY, Disp: 360 tablet, Rfl: 1    TRULICITY 1.5 mg/0.5 mL pen injector, INJECT 1  SYRINGE SUBCUTANEOUSLY ONCE  A WEEK, Disp: 4 pen, Rfl: 6    turmeric root extract 500 mg Cap, Take 1 tablet by mouth 2 (two) times daily., Disp: , Rfl:     vit C/E/Zn/coppr/lutein/zeaxan (PRESERVISION AREDS-2 ORAL), , Disp: , Rfl:

## 2022-05-21 LAB — HBA1C MFR BLD: 6.8 % (ref 4.8–5.6)

## 2022-06-21 NOTE — PROGRESS NOTES
Subjective:       Patient ID: Mg Torre is a 67 y.o. male.    Chief Complaint: Diabetes, Follow-up, Hypertension, Hyperlipidemia, and Psoriasis    Patient is a 67-year-old  male who comes for follow-up.    Underlying medical issues are as below:-    1. Type 2 diabetes mellitus currently on metformin injection Trulicity weekly with   2.-dyslipidemia  3.-hypertension  4.-longstanding underlying psoriasis  5.-history of COVID-19.    He has retired recently as a .    Recent hemoglobin A1c is 6.8 has improved somewhat since the last time.. His blood sugar control has been rather tenuous with fluctuations in past.    Diabetes  He presents for his follow-up diabetic visit. He has type 2 diabetes mellitus. No MedicAlert identification noted. His disease course has been improving (Recent hemoglobin A1c 6.8.). Pertinent negatives for hypoglycemia include no confusion, dizziness, headaches, nervousness/anxiousness, pallor, seizures or speech difficulty. Pertinent negatives for diabetes include no chest pain, no fatigue, no polydipsia, no polyphagia, no polyuria and no weakness. There are no hypoglycemic complications. Pertinent negatives for diabetic complications include no autonomic neuropathy, CVA or PVD. Risk factors for coronary artery disease include dyslipidemia, diabetes mellitus, male sex, hypertension and sedentary lifestyle. Current diabetic treatment includes oral agent (dual therapy) (Recently changed insurance and he had a gap between feeling for Trulicity..). He is compliant with treatment some of the time. His weight is increasing steadily (191-197 lbs). Meal planning includes avoidance of concentrated sweets. He has not had a previous visit with a dietitian. His home blood glucose trend is increasing steadily. His breakfast blood glucose range is generally 140-180 mg/dl. An ACE inhibitor/angiotensin II receptor blocker is contraindicated (Intolerant to Ace and ARB). He does  not see a podiatrist.Eye exam is not current.   Hypertension  This is a chronic problem. The current episode started more than 1 year ago. The problem is unchanged. The problem is controlled. Pertinent negatives include no chest pain, headaches, malaise/fatigue, neck pain, palpitations or shortness of breath. There are no associated agents to hypertension. Risk factors for coronary artery disease include male gender, dyslipidemia and diabetes mellitus. Past treatments include calcium channel blockers and diuretics. The current treatment provides moderate improvement. Compliance problems include psychosocial issues.  There is no history of angina, CVA, heart failure or PVD. There is no history of chronic renal disease, coarctation of the aorta, hyperaldosteronism, hypercortisolism, pheochromocytoma, renovascular disease or sleep apnea.   Hyperlipidemia  This is a chronic problem. The current episode started more than 1 year ago. He has no history of chronic renal disease, diabetes, hypothyroidism, liver disease or obesity. Pertinent negatives include no chest pain or shortness of breath. Current antihyperlipidemic treatment includes statins. The current treatment provides moderate improvement of lipids. Compliance problems include psychosocial issues.  Risk factors for coronary artery disease include hypertension, male sex, dyslipidemia, a sedentary lifestyle and diabetes mellitus.       Past Medical History:   Diagnosis Date    Allergy     pcn    Diabetes mellitus     Diabetes mellitus, type 2     Hyperlipidemia     Hypertension     Lab test positive for detection of COVID-19 virus 8/10/2021    Patient had tested himself at urgent care in [facility].  Minimally symptomatic at this point.  Continue with precautions.  Patient's son is positive with significant problems.     Social History     Socioeconomic History    Marital status:      Spouse name: Sharyn Torre    Number of children:  2   Occupational History    Occupation:    Tobacco Use    Smoking status: Never Smoker    Smokeless tobacco: Never Used   Substance and Sexual Activity    Alcohol use: Yes    Drug use: No    Sexual activity: Yes     Partners: Female   Social History Narrative    Together 2 children- raised 6 total with Ms Coles     Social Determinants of Health     Financial Resource Strain: Medium Risk    Difficulty of Paying Living Expenses: Somewhat hard   Food Insecurity: No Food Insecurity    Worried About Running Out of Food in the Last Year: Never true    Ran Out of Food in the Last Year: Never true   Transportation Needs: No Transportation Needs    Lack of Transportation (Medical): No    Lack of Transportation (Non-Medical): No   Physical Activity: Inactive    Days of Exercise per Week: 0 days    Minutes of Exercise per Session: 0 min   Stress: No Stress Concern Present    Feeling of Stress : Only a little   Social Connections: Moderately Integrated    Frequency of Communication with Friends and Family: Three times a week    Frequency of Social Gatherings with Friends and Family: Three times a week    Attends Evangelical Services: 1 to 4 times per year    Active Member of Clubs or Organizations: No    Attends Club or Organization Meetings: Never    Marital Status:    Housing Stability: Low Risk     Unable to Pay for Housing in the Last Year: No    Number of Places Lived in the Last Year: 1    Unstable Housing in the Last Year: No     Past Surgical History:   Procedure Laterality Date    APPENDECTOMY      SPINE SURGERY       Family History   Problem Relation Age of Onset    Heart disease Father     Obesity Son        Review of Systems   Constitutional: Negative for activity change, appetite change, chills, diaphoresis, fatigue, fever, malaise/fatigue and unexpected weight change.   HENT: Negative for congestion, hearing loss, rhinorrhea, sneezing and trouble swallowing.          "Sinus problems.   Eyes: Negative for pain, discharge, redness and visual disturbance.   Respiratory: Negative for cough, chest tightness, shortness of breath and wheezing.    Cardiovascular: Negative for chest pain, palpitations and leg swelling.        Borderline hypertension.   Gastrointestinal: Negative for abdominal distention, abdominal pain, blood in stool, constipation, diarrhea and vomiting.   Endocrine: Negative for cold intolerance, heat intolerance, polydipsia, polyphagia and polyuria.        Diabetes mellitus.  Showing worsening with recent hemoglobin A1c of > 8.0   Genitourinary: Negative for difficulty urinating, dysuria, frequency, hematuria and urgency.        No significant prostate symptoms   Musculoskeletal: Positive for arthralgias (hand pains). Negative for back pain, gait problem, joint swelling and neck pain.        Patient is status post right-sided hip arthroplasty and is recovering gradually with acceptable range of motion and quality of life.   Skin: Positive for rash (psoriasis). Negative for color change, pallor and wound.        Extensive psoriasis on the skin especially in the trunk and back.  Also has spots of vitiligo on the back and also on the hands..   Neurological: Negative for dizziness, seizures, speech difficulty, weakness, numbness and headaches.        Last year he had complained of tingling and numbness in the left hand and he is doing okay now.  Probably it was due to prolonged .   Psychiatric/Behavioral: Negative for agitation, behavioral problems, confusion and dysphoric mood. The patient is not nervous/anxious.          Objective:      Blood pressure 129/68, pulse 83, height 5' 8" (1.727 m), weight 88 kg (194 lb). Body mass index is 29.5 kg/m².  Physical Exam  Constitutional:       General: He is not in acute distress.     Appearance: He is well-developed. He is not ill-appearing, toxic-appearing or diaphoretic.      Comments: BMI is 29.95   HENT:      " Head: Normocephalic and atraumatic.      Mouth/Throat:      Comments: Patient is wearing a facial mask.  COVID-19  Eyes:      General: No scleral icterus.        Right eye: No discharge.         Left eye: No discharge.   Neck:      Thyroid: No thyromegaly.      Vascular: No JVD.      Trachea: No tracheal deviation.   Cardiovascular:      Rate and Rhythm: Normal rate and regular rhythm.      Heart sounds: Normal heart sounds. No murmur heard.  Pulmonary:      Effort: No respiratory distress.      Breath sounds: Normal breath sounds. No stridor.   Abdominal:      General: Bowel sounds are normal. There is no distension.      Palpations: Abdomen is soft.      Tenderness: There is no abdominal tenderness. There is no rebound.   Musculoskeletal:         General: No tenderness or deformity.      Cervical back: No tenderness.      Right lower leg: No edema.      Left lower leg: No edema.        Feet:    Feet:      Right foot:      Protective Sensation: 5 sites tested. 5 sites sensed.      Skin integrity: Callus present.      Toenail Condition: Right toenails are long.      Left foot:      Protective Sensation: 5 sites tested. 5 sites sensed.      Skin integrity: Callus present.      Toenail Condition: Left toenails are long.      Comments: Calluses  Lymphadenopathy:      Cervical: No cervical adenopathy.   Skin:     General: Skin is dry.      Findings: Rash (psoriasis) present.      Comments: Extensive psoriasis noted on the skin in the trunk and back.  Vitiligo on the upper shoulder and upper back.  This runs in the family including mother and perhaps 1 of the sisters.  Also patient's son has it.   Neurological:      Mental Status: He is alert. Mental status is at baseline.   Psychiatric:         Behavior: Behavior normal.           Assessment:       1. Type 2 diabetes mellitus without complication, without long-term current use of insulin    2. Mixed dyslipidemia    3. Essential hypertension    4. History of COVID-19     5. Other psoriasis           No visits with results within 3 Month(s) from this visit.   Latest known visit with results is:   Office Visit on 02/24/2022   Component Date Value Ref Range Status    Hemoglobin A1c 05/20/2022 6.8 (A) 4.8 - 5.6 % Final         Plan:           Type 2 diabetes mellitus without complication, without long-term current use of insulin  -     Microalbumin/Creatinine Ratio, Urine; Future; Expected date: 09/26/2022  -     Hemoglobin A1C; Future; Expected date: 09/26/2022  -     Ambulatory referral/consult to Optometry; Future; Expected date: 07/06/2022    Mixed dyslipidemia  -     Comprehensive Metabolic Panel; Future; Expected date: 09/26/2022  -     Lipid Panel; Future; Expected date: 09/26/2022    Essential hypertension  -     Comprehensive Metabolic Panel; Future; Expected date: 09/26/2022    History of COVID-19    Other psoriasis    Advised Mr. Torre about age and season appropriate immunizations/ cancer screenings.  Also seasonal influenza vaccine, update on tetanus diphtheria vaccination every 10 years.    Hemoglobin A1c is better.    He has change his insurance and there was some question of being in the donut hole and being able to get Trulicity but he has addressed that.  He is generally happy camper though his wife might not be happy camper and she always urges him to go and see his friends for fishing instead of staying home.    Consider another booster dose of pneumonia vaccine next year.  Patient has been advised to watch diet and exercise. Avoid fried and fatty food. Compliance to medications and follow up urged.    Advised Pt about age and season appropriate immunizations/ cancer screenings.  Also seasonal influenza vaccine, update on tetanus diphtheria vaccination every 10 years.  Patient has been advised to watch diet and exercise. Avoid fried and fatty food. Compliance to medications and follow up urged.  Advised Pt. to monitor Blood sugars at home and record  them.  Advised Pt  for Anti reflux measures like small feequent meals, avoid spicy and greasy food. Head end up at night.  keep a close eye on feet and keep them clean. Annual eye examination. Annual influenza vaccine.  Monitor HgbA1c every 3 to 6 months. Monitor urine microalbumin every year.keep LDL less than 100. Monitor blood pressure and target blood pressure 120/70.  Please utilize precautions for current COVID-19 pandemic.  Try to avoid crowds or close contact with multiple people.  Minimize outside interaction.  Wash hands with soap for  frequently upon contact.Use face mask or cover.    Fup 4 months    Spent julee 30 minutes with patient which involved review of pts medical conditions, labs, medications and with 50% of time face-to-face discussion about medical problems, management and any applicable changes.        Current Outpatient Medications:     amLODIPine (NORVASC) 10 MG tablet, TAKE ONE TABLET BY MOUTH EVERY DAY, Disp: 90 tablet, Rfl: 1    aspirin 81 MG Chew, 1 tablet once daily., Disp: , Rfl:     atorvastatin (LIPITOR) 40 MG tablet, TAKE ONE TABLET BY MOUTH EVERY MORNING, Disp: 90 tablet, Rfl: 3    cetirizine 10 mg Cap, Take 1 tablet by mouth once daily., Disp: , Rfl:     glipiZIDE (GLUCOTROL) 5 MG TR24, TAKE ONE TABLET BY MOUTH TWICE DAILY, Disp: 180 tablet, Rfl: 3    hydroCHLOROthiazide (MICROZIDE) 12.5 mg capsule, TAKE ONE CAPSULE BY MOUTH EVERY DAY, Disp: 90 capsule, Rfl: 3    metFORMIN (GLUCOPHAGE) 500 MG tablet, TAKE TWO TABLETS BY MOUTH TWICE DAILY, Disp: 360 tablet, Rfl: 1    TRULICITY 1.5 mg/0.5 mL pen injector, INJECT 1  SYRINGE SUBCUTANEOUSLY ONCE A WEEK, Disp: 4 pen, Rfl: 6    turmeric root extract 500 mg Cap, Take 1 tablet by mouth 2 (two) times daily., Disp: , Rfl:     vit C/E/Zn/coppr/lutein/zeaxan (PRESERVISION AREDS-2 ORAL), , Disp: , Rfl:

## 2022-06-23 ENCOUNTER — OFFICE VISIT (OUTPATIENT)
Dept: FAMILY MEDICINE | Facility: CLINIC | Age: 67
End: 2022-06-23
Payer: COMMERCIAL

## 2022-06-23 VITALS
HEIGHT: 68 IN | WEIGHT: 194 LBS | SYSTOLIC BLOOD PRESSURE: 129 MMHG | HEART RATE: 83 BPM | BODY MASS INDEX: 29.4 KG/M2 | DIASTOLIC BLOOD PRESSURE: 68 MMHG

## 2022-06-23 DIAGNOSIS — L40.8 OTHER PSORIASIS: ICD-10-CM

## 2022-06-23 DIAGNOSIS — I10 ESSENTIAL HYPERTENSION: ICD-10-CM

## 2022-06-23 DIAGNOSIS — E78.2 MIXED DYSLIPIDEMIA: ICD-10-CM

## 2022-06-23 DIAGNOSIS — E11.9 TYPE 2 DIABETES MELLITUS WITHOUT COMPLICATION, WITHOUT LONG-TERM CURRENT USE OF INSULIN: Primary | ICD-10-CM

## 2022-06-23 DIAGNOSIS — Z86.16 HISTORY OF COVID-19: ICD-10-CM

## 2022-06-23 PROCEDURE — 1101F PR PT FALLS ASSESS DOC 0-1 FALLS W/OUT INJ PAST YR: ICD-10-PCS | Mod: CPTII,S$GLB,, | Performed by: INTERNAL MEDICINE

## 2022-06-23 PROCEDURE — 3061F PR NEG MICROALBUMINURIA RESULT DOCUMENTED/REVIEW: ICD-10-PCS | Mod: CPTII,S$GLB,, | Performed by: INTERNAL MEDICINE

## 2022-06-23 PROCEDURE — 1159F PR MEDICATION LIST DOCUMENTED IN MEDICAL RECORD: ICD-10-PCS | Mod: CPTII,S$GLB,, | Performed by: INTERNAL MEDICINE

## 2022-06-23 PROCEDURE — 3066F NEPHROPATHY DOC TX: CPT | Mod: CPTII,S$GLB,, | Performed by: INTERNAL MEDICINE

## 2022-06-23 PROCEDURE — 1159F MED LIST DOCD IN RCRD: CPT | Mod: CPTII,S$GLB,, | Performed by: INTERNAL MEDICINE

## 2022-06-23 PROCEDURE — 3061F NEG MICROALBUMINURIA REV: CPT | Mod: CPTII,S$GLB,, | Performed by: INTERNAL MEDICINE

## 2022-06-23 PROCEDURE — 3008F BODY MASS INDEX DOCD: CPT | Mod: CPTII,S$GLB,, | Performed by: INTERNAL MEDICINE

## 2022-06-23 PROCEDURE — 3066F PR DOCUMENTATION OF TREATMENT FOR NEPHROPATHY: ICD-10-PCS | Mod: CPTII,S$GLB,, | Performed by: INTERNAL MEDICINE

## 2022-06-23 PROCEDURE — 3008F PR BODY MASS INDEX (BMI) DOCUMENTED: ICD-10-PCS | Mod: CPTII,S$GLB,, | Performed by: INTERNAL MEDICINE

## 2022-06-23 PROCEDURE — 1126F AMNT PAIN NOTED NONE PRSNT: CPT | Mod: CPTII,S$GLB,, | Performed by: INTERNAL MEDICINE

## 2022-06-23 PROCEDURE — 1126F PR PAIN SEVERITY QUANTIFIED, NO PAIN PRESENT: ICD-10-PCS | Mod: CPTII,S$GLB,, | Performed by: INTERNAL MEDICINE

## 2022-06-23 PROCEDURE — 1160F RVW MEDS BY RX/DR IN RCRD: CPT | Mod: CPTII,S$GLB,, | Performed by: INTERNAL MEDICINE

## 2022-06-23 PROCEDURE — 3078F DIAST BP <80 MM HG: CPT | Mod: CPTII,S$GLB,, | Performed by: INTERNAL MEDICINE

## 2022-06-23 PROCEDURE — 3074F SYST BP LT 130 MM HG: CPT | Mod: CPTII,S$GLB,, | Performed by: INTERNAL MEDICINE

## 2022-06-23 PROCEDURE — 3288F PR FALLS RISK ASSESSMENT DOCUMENTED: ICD-10-PCS | Mod: CPTII,S$GLB,, | Performed by: INTERNAL MEDICINE

## 2022-06-23 PROCEDURE — 1101F PT FALLS ASSESS-DOCD LE1/YR: CPT | Mod: CPTII,S$GLB,, | Performed by: INTERNAL MEDICINE

## 2022-06-23 PROCEDURE — 3074F PR MOST RECENT SYSTOLIC BLOOD PRESSURE < 130 MM HG: ICD-10-PCS | Mod: CPTII,S$GLB,, | Performed by: INTERNAL MEDICINE

## 2022-06-23 PROCEDURE — 3078F PR MOST RECENT DIASTOLIC BLOOD PRESSURE < 80 MM HG: ICD-10-PCS | Mod: CPTII,S$GLB,, | Performed by: INTERNAL MEDICINE

## 2022-06-23 PROCEDURE — 99214 PR OFFICE/OUTPT VISIT, EST, LEVL IV, 30-39 MIN: ICD-10-PCS | Mod: S$GLB,,, | Performed by: INTERNAL MEDICINE

## 2022-06-23 PROCEDURE — 1160F PR REVIEW ALL MEDS BY PRESCRIBER/CLIN PHARMACIST DOCUMENTED: ICD-10-PCS | Mod: CPTII,S$GLB,, | Performed by: INTERNAL MEDICINE

## 2022-06-23 PROCEDURE — 3288F FALL RISK ASSESSMENT DOCD: CPT | Mod: CPTII,S$GLB,, | Performed by: INTERNAL MEDICINE

## 2022-06-23 PROCEDURE — 99214 OFFICE O/P EST MOD 30 MIN: CPT | Mod: S$GLB,,, | Performed by: INTERNAL MEDICINE

## 2022-10-20 LAB
ALBUMIN SERPL-MCNC: 4.7 G/DL (ref 3.8–4.8)
ALBUMIN/CREAT UR: <4 MG/G CREAT (ref 0–29)
ALBUMIN/GLOB SERPL: 1.9 {RATIO} (ref 1.2–2.2)
ALP SERPL-CCNC: 95 IU/L (ref 44–121)
ALT SERPL-CCNC: 19 IU/L (ref 0–44)
AST SERPL-CCNC: 18 IU/L (ref 0–40)
BILIRUB SERPL-MCNC: 0.5 MG/DL (ref 0–1.2)
BUN SERPL-MCNC: 16 MG/DL (ref 8–27)
BUN/CREAT SERPL: 15 (ref 10–24)
CALCIUM SERPL-MCNC: 10.3 MG/DL (ref 8.6–10.2)
CHLORIDE SERPL-SCNC: 95 MMOL/L (ref 96–106)
CHOLEST SERPL-MCNC: 122 MG/DL (ref 100–199)
CO2 SERPL-SCNC: 25 MMOL/L (ref 20–29)
CREAT SERPL-MCNC: 1.04 MG/DL (ref 0.76–1.27)
CREAT UR-MCNC: 82.6 MG/DL
EST. GFR  (NO RACE VARIABLE): 79 ML/MIN/1.73
GLOBULIN SER CALC-MCNC: 2.5 G/DL (ref 1.5–4.5)
GLUCOSE SERPL-MCNC: 176 MG/DL (ref 70–99)
HBA1C MFR BLD: 7.2 % (ref 4.8–5.6)
HDLC SERPL-MCNC: 39 MG/DL
LDLC SERPL CALC-MCNC: 66 MG/DL (ref 0–99)
MICROALBUMIN UR-MCNC: <3 UG/ML
POTASSIUM SERPL-SCNC: 5.7 MMOL/L (ref 3.5–5.2)
PROT SERPL-MCNC: 7.2 G/DL (ref 6–8.5)
SODIUM SERPL-SCNC: 133 MMOL/L (ref 134–144)
TRIGL SERPL-MCNC: 89 MG/DL (ref 0–149)
VLDLC SERPL CALC-MCNC: 17 MG/DL (ref 5–40)

## 2022-12-05 NOTE — PROGRESS NOTES
Subjective:       Patient ID: Mg Torre is a 67 y.o. male.    Chief Complaint: Diabetes, Hyperlipidemia, Hypertension, and Psoriais    Patient is a 67-year-old  male who comes for follow-up.    Underlying medical issues are as below:-    1. Type 2 diabetes mellitus currently on metformin injection Trulicity weekly with   2.-dyslipidemia  3.-hypertension  4.-longstanding underlying psoriasis  5.-history of COVID-19.    He has retired recently as a .    Recent hemoglobin A1c is 7.2 which has shown slight worsening as compared to 6.8 since  last time.. His blood sugar control has been rather tenuous with fluctuations in past.    Recent labs also had shown elevated potassium.  He is not on any ACE inhibitors or ARB ease.  Somewhat increased calcium was noted.  He was advised to remain hydrated till follow-up.    Longstanding history of psoriasis also has been noted for which he has not salt any formal care.      Recently has changed insurances to [employer].  Which limits ability to afford medications.    For diabetes he continues on glipizide, metformin injection Trulicity.    Diabetes  He presents for his follow-up diabetic visit. He has type 2 diabetes mellitus. No MedicAlert identification noted. The initial diagnosis of diabetes was made 20 years ago. His disease course has been worsening (Recent hemoglobin A1c 6.8.). Pertinent negatives for hypoglycemia include no confusion, dizziness, headaches, nervousness/anxiousness, pallor, seizures or speech difficulty. Pertinent negatives for diabetes include no chest pain, no fatigue, no polydipsia, no polyphagia, no polyuria and no weakness. There are no hypoglycemic complications. Pertinent negatives for diabetic complications include no autonomic neuropathy, CVA or PVD. Risk factors for coronary artery disease include dyslipidemia, diabetes mellitus, male sex, hypertension and sedentary lifestyle. Current diabetic treatment includes oral  agent (dual therapy) (Recently changed insurance and he had a gap between feeling for Trulicity..). He is compliant with treatment some of the time. His weight is increasing steadily (191-197 lbs). Meal planning includes avoidance of concentrated sweets. He has not had a previous visit with a dietitian. His home blood glucose trend is increasing steadily. His breakfast blood glucose range is generally 140-180 mg/dl. An ACE inhibitor/angiotensin II receptor blocker is contraindicated (Intolerant to Ace and ARB). He does not see a podiatrist.Eye exam is not current.   Hypertension  This is a chronic problem. The current episode started more than 1 year ago. The problem is unchanged. The problem is controlled. Pertinent negatives include no chest pain, headaches, malaise/fatigue, neck pain, palpitations or shortness of breath. There are no associated agents to hypertension. Risk factors for coronary artery disease include male gender, dyslipidemia and diabetes mellitus. Past treatments include calcium channel blockers and diuretics. The current treatment provides moderate improvement. Compliance problems include psychosocial issues.  There is no history of angina, CVA, heart failure or PVD. There is no history of chronic renal disease, coarctation of the aorta, hyperaldosteronism, hypercortisolism, pheochromocytoma, renovascular disease or sleep apnea.   Hyperlipidemia  This is a chronic problem. The current episode started more than 1 year ago. He has no history of chronic renal disease, diabetes, hypothyroidism, liver disease or obesity. Pertinent negatives include no chest pain or shortness of breath. Current antihyperlipidemic treatment includes statins. The current treatment provides moderate improvement of lipids. Compliance problems include psychosocial issues.  Risk factors for coronary artery disease include hypertension, male sex, dyslipidemia, a sedentary lifestyle and diabetes mellitus.     Past Medical  History:   Diagnosis Date    Allergy     pcn    Diabetes mellitus     Diabetes mellitus, type 2     Hyperlipidemia     Hypertension     Lab test positive for detection of COVID-19 virus 8/10/2021    Patient had tested himself at urgent care in [facility].  Minimally symptomatic at this point.  Continue with precautions.  Patient's son is positive with significant problems.     Social History     Socioeconomic History    Marital status:      Spouse name: Sharyn Torre    Number of children: 2   Occupational History    Occupation:    Tobacco Use    Smoking status: Never    Smokeless tobacco: Never   Substance and Sexual Activity    Alcohol use: Yes    Drug use: No    Sexual activity: Yes     Partners: Female   Social History Narrative    Together 2 children- raised 6 total with Ms Coles     Social Determinants of Health     Financial Resource Strain: Medium Risk    Difficulty of Paying Living Expenses: Somewhat hard   Food Insecurity: No Food Insecurity    Worried About Running Out of Food in the Last Year: Never true    Ran Out of Food in the Last Year: Never true   Transportation Needs: No Transportation Needs    Lack of Transportation (Medical): No    Lack of Transportation (Non-Medical): No   Physical Activity: Inactive    Days of Exercise per Week: 0 days    Minutes of Exercise per Session: 0 min   Stress: No Stress Concern Present    Feeling of Stress : Only a little   Social Connections: Moderately Integrated    Frequency of Communication with Friends and Family: Three times a week    Frequency of Social Gatherings with Friends and Family: Three times a week    Attends Yazidi Services: 1 to 4 times per year    Active Member of Clubs or Organizations: No    Attends Club or Organization Meetings: Never    Marital Status:    Housing Stability: Low Risk     Unable to Pay for Housing in the Last Year: No    Number of Places Lived in the Last Year: 1    Unstable Housing  in the Last Year: No     Past Surgical History:   Procedure Laterality Date    APPENDECTOMY      SPINE SURGERY       Family History   Problem Relation Age of Onset    Heart disease Father     Obesity Son        Review of Systems   Constitutional:  Negative for activity change, appetite change, chills, diaphoresis, fatigue, fever, malaise/fatigue and unexpected weight change (gained 5 lbs).   HENT:  Negative for congestion, hearing loss, rhinorrhea, sneezing and trouble swallowing.         Sinus problems.   Eyes:  Negative for pain, discharge, redness and visual disturbance.   Respiratory:  Negative for cough, chest tightness, shortness of breath and wheezing.    Cardiovascular:  Negative for chest pain, palpitations and leg swelling.        Borderline hypertension.   Gastrointestinal:  Negative for abdominal distention, abdominal pain, blood in stool, constipation, diarrhea and vomiting.   Endocrine: Negative for cold intolerance, heat intolerance, polydipsia, polyphagia and polyuria.        Diabetes mellitus.  Showing worsening with recent hemoglobin A1c of > 8.0   Genitourinary:  Negative for difficulty urinating, dysuria, frequency, hematuria and urgency.        No significant prostate symptoms   Musculoskeletal:  Positive for arthralgias (hand pains). Negative for back pain, gait problem, joint swelling and neck pain.        Patient is status post right-sided hip arthroplasty and is recovering gradually with acceptable range of motion and quality of life.   Skin:  Positive for rash (psoriasis). Negative for color change, pallor and wound.        Extensive psoriasis on the skin especially in the trunk and back.  Also has spots of vitiligo on the back and also on the hands..   Neurological:  Negative for dizziness, seizures, speech difficulty, weakness, numbness and headaches.        Last year he had complained of tingling and numbness in the left hand and he is doing okay now.  Probably it was due to prolonged  ".   Psychiatric/Behavioral:  Negative for agitation, behavioral problems, confusion and dysphoric mood. The patient is not nervous/anxious.        Objective:      Blood pressure 118/71, pulse 88, height 5' 8" (1.727 m), weight 90.3 kg (199 lb). Body mass index is 30.26 kg/m².  Physical Exam  Constitutional:       General: He is not in acute distress.     Appearance: He is well-developed. He is not ill-appearing, toxic-appearing or diaphoretic.      Comments: BMI is 30.26   HENT:      Head: Normocephalic and atraumatic.      Mouth/Throat:      Comments: Patient is wearing a facial mask.  COVID-19  Eyes:      General: No scleral icterus.        Right eye: No discharge.         Left eye: No discharge.   Neck:      Thyroid: No thyromegaly.      Vascular: No JVD.      Trachea: No tracheal deviation.   Cardiovascular:      Rate and Rhythm: Normal rate and regular rhythm.      Heart sounds: Normal heart sounds. No murmur heard.  Pulmonary:      Effort: No respiratory distress.      Breath sounds: Normal breath sounds. No stridor.   Abdominal:      General: There is no distension.      Palpations: Abdomen is soft.      Tenderness: There is no abdominal tenderness. There is no rebound.   Musculoskeletal:         General: No tenderness or deformity.      Cervical back: No tenderness.      Right lower leg: No edema.      Left lower leg: No edema.        Feet:    Feet:      Right foot:      Protective Sensation: 5 sites tested.  5 sites sensed.      Skin integrity: Callus present.      Toenail Condition: Right toenails are long.      Left foot:      Protective Sensation: 5 sites tested.  5 sites sensed.      Skin integrity: Callus present.      Toenail Condition: Left toenails are long.      Comments: Calluses  Lymphadenopathy:      Cervical: No cervical adenopathy.   Skin:     General: Skin is dry.      Findings: Rash (psoriasis) present.      Comments: Extensive psoriasis noted on the skin in the trunk and back.  " Vitiligo on the upper shoulder and upper back.  This runs in the family including mother and perhaps 1 of the sisters.  Also patient's son has it.   Neurological:      Mental Status: He is alert. Mental status is at baseline.   Psychiatric:         Behavior: Behavior normal.         Assessment:       1. Type 2 diabetes mellitus without complication, without long-term current use of insulin    2. Mixed dyslipidemia    3. Essential hypertension    4. History of COVID-19    5. Other psoriasis    6. Serum potassium elevated    7. Serum calcium elevated    8. Need for influenza vaccination           No visits with results within 3 Month(s) from this visit.   Latest known visit with results is:   Office Visit on 06/23/2022   Component Date Value Ref Range Status    Glucose 10/17/2022 176 (H)  70 - 99 mg/dL Final    BUN 10/17/2022 16  8 - 27 mg/dL Final    Creatinine 10/17/2022 1.04  0.76 - 1.27 mg/dL Final    eGFR 10/17/2022 79  >59 mL/min/1.73 Final    BUN/Creatinine Ratio 10/17/2022 15  10 - 24 Final    Sodium 10/17/2022 133 (L)  134 - 144 mmol/L Final    Potassium 10/17/2022 5.7 (H)  3.5 - 5.2 mmol/L Final    Chloride 10/17/2022 95 (L)  96 - 106 mmol/L Final    CO2 10/17/2022 25  20 - 29 mmol/L Final    Calcium 10/17/2022 10.3 (H)  8.6 - 10.2 mg/dL Final    Protein, Total 10/17/2022 7.2  6.0 - 8.5 g/dL Final    Albumin 10/17/2022 4.7  3.8 - 4.8 g/dL Final    Globulin, Total 10/17/2022 2.5  1.5 - 4.5 g/dL Final    Albumin/Globulin Ratio 10/17/2022 1.9  1.2 - 2.2 Final    Total Bilirubin 10/17/2022 0.5  0.0 - 1.2 mg/dL Final    Alkaline Phosphatase 10/17/2022 95  44 - 121 IU/L Final    AST 10/17/2022 18  0 - 40 IU/L Final    ALT 10/17/2022 19  0 - 44 IU/L Final    Cholesterol 10/17/2022 122  100 - 199 mg/dL Final    Triglycerides 10/17/2022 89  0 - 149 mg/dL Final    HDL 10/17/2022 39 (L)  >39 mg/dL Final    VLDL Cholesterol Dinesh 10/17/2022 17  5 - 40 mg/dL Final    LDL Calculated 10/17/2022 66  0 - 99 mg/dL Final     Creatinine, Urine 10/17/2022 82.6  Not Estab. mg/dL Final    Microalb, Ur 10/17/2022 <3.0  Not Estab. ug/mL Final    Microalb/Crt. Ratio 10/17/2022 <4  0 - 29 mg/g creat Final    Hemoglobin A1c 10/17/2022 7.2 (H)  4.8 - 5.6 % Final     Component Ref Range & Units 1 mo ago   (10/17/22) 6 mo ago   (5/20/22) 9 mo ago   (2/19/22) 1 yr ago   (10/1/21) 1 yr ago   (2/27/21) 1 yr ago   (12/18/20) 2 yr ago   (9/5/20)   Hemoglobin A1c 4.8 - 5.6 % 7.2 High   6.8 High  CM  8.1 High  CM  6.3 High  CM  6.9 High  CM  8.2 High  CM  8.1 High  CM    Comment:          Prediabetes: 5.7 - 6.4            Diabetes: >6.4        Plan:           Type 2 diabetes mellitus without complication, without long-term current use of insulin  -     metFORMIN (GLUCOPHAGE) 850 MG tablet; Take 1 tablet (850 mg total) by mouth 2 (two) times daily.  Dispense: 180 tablet; Refill: 1  -     Hemoglobin A1C; Future; Expected date: 03/07/2023    Mixed dyslipidemia    Essential hypertension    History of COVID-19    Other psoriasis    Serum potassium elevated  -     Basic Metabolic Panel; Future; Expected date: 12/07/2022    Serum calcium elevated  -     Basic Metabolic Panel; Future; Expected date: 12/07/2022    Need for influenza vaccination  -     Influenza - Quadrivalent - High Dose (65+) (PF) (IM)    Patient's diabetes shoes slight worsening.    Compliance to diet and exercise efforts is modest at best.      Elevated potassium and calcium levels have been noted and will check labs again.      He was advised to remain hydrated.      Preventive care issues of shingles vaccine, COVID precautions with vaccination and influenza vaccination have been noted again.      He is retired from his longstanding job as a .      Check A1c levels and labs at follow-up also.      Check BMP now.      OKAY HE DOES PLAN TO CHANGES INSURANCE TO [facility] NEXT YEAR.  HOPEFULLY THEY WILL HAVE BETTER COVERAGE FOR MEDICATIONS.  I WILL SEE HIM BACK IN 3-4 MONTHS AND PRIOR  TO THAT WILL CHECK HEMOGLOBIN A1C AGAIN.    Advised Pt about age and season appropriate immunizations/ cancer screenings.  Also seasonal influenza vaccine, update on tetanus diphtheria vaccination every 10 years.  Patient has been advised to watch diet and exercise. Avoid fried and fatty food. Compliance to medications and follow up urged.  Advised Pt. to monitor Blood sugars at home and record them.  Advised Pt  for Anti reflux measures like small feequent meals, avoid spicy and greasy food. Head end up at night.  keep a close eye on feet and keep them clean. Annual eye examination. Annual influenza vaccine.  Monitor HgbA1c every 3 to 6 months. Monitor urine microalbumin every year.keep LDL less than 100. Monitor blood pressure and target blood pressure 120/70.  Please utilize precautions for current COVID-19 pandemic.  Try to avoid crowds or close contact with multiple people.  Minimize outside interaction.  Wash hands with soap for  frequently upon contact.Use face mask or cover.    Fup    Spent julee 30 minutes with patient which involved review of pts medical conditions, labs, medications and with 50% of time face-to-face discussion about medical problems, management and any applicable changes.      Current Outpatient Medications:     amLODIPine (NORVASC) 10 MG tablet, TAKE ONE TABLET BY MOUTH EVERY DAY, Disp: 90 tablet, Rfl: 1    aspirin 81 MG Chew, 1 tablet once daily., Disp: , Rfl:     atorvastatin (LIPITOR) 40 MG tablet, TAKE ONE TABLET BY MOUTH EVERY MORNING, Disp: 90 tablet, Rfl: 3    cetirizine 10 mg Cap, Take 1 tablet by mouth once daily., Disp: , Rfl:     glipiZIDE (GLUCOTROL) 5 MG TR24, TAKE ONE TABLET BY MOUTH TWICE DAILY, Disp: 180 tablet, Rfl: 3    hydroCHLOROthiazide (MICROZIDE) 12.5 mg capsule, TAKE ONE CAPSULE BY MOUTH EVERY DAY, Disp: 90 capsule, Rfl: 3    TRULICITY 1.5 mg/0.5 mL pen injector, INJECT 1 SYRINGE SUBCUTANEOUSLY ONCE A WEEK, Disp: 4 pen, Rfl: 5    turmeric root extract 500  mg Cap, Take 1 tablet by mouth 2 (two) times daily., Disp: , Rfl:     metFORMIN (GLUCOPHAGE) 850 MG tablet, Take 1 tablet (850 mg total) by mouth 2 (two) times daily., Disp: 180 tablet, Rfl: 1    vit C/E/Zn/coppr/lutein/zeaxan (PRESERVISION AREDS-2 ORAL), , Disp: , Rfl:

## 2022-12-07 ENCOUNTER — OFFICE VISIT (OUTPATIENT)
Dept: FAMILY MEDICINE | Facility: CLINIC | Age: 67
End: 2022-12-07
Payer: COMMERCIAL

## 2022-12-07 VITALS
BODY MASS INDEX: 30.16 KG/M2 | HEIGHT: 68 IN | DIASTOLIC BLOOD PRESSURE: 71 MMHG | HEART RATE: 88 BPM | WEIGHT: 199 LBS | SYSTOLIC BLOOD PRESSURE: 118 MMHG

## 2022-12-07 DIAGNOSIS — Z23 NEED FOR INFLUENZA VACCINATION: ICD-10-CM

## 2022-12-07 DIAGNOSIS — I10 ESSENTIAL HYPERTENSION: ICD-10-CM

## 2022-12-07 DIAGNOSIS — E78.2 MIXED DYSLIPIDEMIA: ICD-10-CM

## 2022-12-07 DIAGNOSIS — L40.8 OTHER PSORIASIS: ICD-10-CM

## 2022-12-07 DIAGNOSIS — Z86.16 HISTORY OF COVID-19: ICD-10-CM

## 2022-12-07 DIAGNOSIS — E83.52 SERUM CALCIUM ELEVATED: ICD-10-CM

## 2022-12-07 DIAGNOSIS — E87.5 SERUM POTASSIUM ELEVATED: ICD-10-CM

## 2022-12-07 DIAGNOSIS — E11.9 TYPE 2 DIABETES MELLITUS WITHOUT COMPLICATION, WITHOUT LONG-TERM CURRENT USE OF INSULIN: Primary | ICD-10-CM

## 2022-12-07 PROCEDURE — 3008F PR BODY MASS INDEX (BMI) DOCUMENTED: ICD-10-PCS | Mod: CPTII,S$GLB,, | Performed by: INTERNAL MEDICINE

## 2022-12-07 PROCEDURE — 3061F NEG MICROALBUMINURIA REV: CPT | Mod: CPTII,S$GLB,, | Performed by: INTERNAL MEDICINE

## 2022-12-07 PROCEDURE — 3288F FALL RISK ASSESSMENT DOCD: CPT | Mod: CPTII,S$GLB,, | Performed by: INTERNAL MEDICINE

## 2022-12-07 PROCEDURE — 1126F PR PAIN SEVERITY QUANTIFIED, NO PAIN PRESENT: ICD-10-PCS | Mod: CPTII,S$GLB,, | Performed by: INTERNAL MEDICINE

## 2022-12-07 PROCEDURE — 90662 IIV NO PRSV INCREASED AG IM: CPT | Mod: S$GLB,,, | Performed by: INTERNAL MEDICINE

## 2022-12-07 PROCEDURE — 1160F RVW MEDS BY RX/DR IN RCRD: CPT | Mod: CPTII,S$GLB,, | Performed by: INTERNAL MEDICINE

## 2022-12-07 PROCEDURE — 1159F PR MEDICATION LIST DOCUMENTED IN MEDICAL RECORD: ICD-10-PCS | Mod: CPTII,S$GLB,, | Performed by: INTERNAL MEDICINE

## 2022-12-07 PROCEDURE — 3288F PR FALLS RISK ASSESSMENT DOCUMENTED: ICD-10-PCS | Mod: CPTII,S$GLB,, | Performed by: INTERNAL MEDICINE

## 2022-12-07 PROCEDURE — 3074F SYST BP LT 130 MM HG: CPT | Mod: CPTII,S$GLB,, | Performed by: INTERNAL MEDICINE

## 2022-12-07 PROCEDURE — 1160F PR REVIEW ALL MEDS BY PRESCRIBER/CLIN PHARMACIST DOCUMENTED: ICD-10-PCS | Mod: CPTII,S$GLB,, | Performed by: INTERNAL MEDICINE

## 2022-12-07 PROCEDURE — 3074F PR MOST RECENT SYSTOLIC BLOOD PRESSURE < 130 MM HG: ICD-10-PCS | Mod: CPTII,S$GLB,, | Performed by: INTERNAL MEDICINE

## 2022-12-07 PROCEDURE — 3051F PR MOST RECENT HEMOGLOBIN A1C LEVEL 7.0 - < 8.0%: ICD-10-PCS | Mod: CPTII,S$GLB,, | Performed by: INTERNAL MEDICINE

## 2022-12-07 PROCEDURE — 3078F PR MOST RECENT DIASTOLIC BLOOD PRESSURE < 80 MM HG: ICD-10-PCS | Mod: CPTII,S$GLB,, | Performed by: INTERNAL MEDICINE

## 2022-12-07 PROCEDURE — 1126F AMNT PAIN NOTED NONE PRSNT: CPT | Mod: CPTII,S$GLB,, | Performed by: INTERNAL MEDICINE

## 2022-12-07 PROCEDURE — 3066F NEPHROPATHY DOC TX: CPT | Mod: CPTII,S$GLB,, | Performed by: INTERNAL MEDICINE

## 2022-12-07 PROCEDURE — 3051F HG A1C>EQUAL 7.0%<8.0%: CPT | Mod: CPTII,S$GLB,, | Performed by: INTERNAL MEDICINE

## 2022-12-07 PROCEDURE — 90662 FLU VACCINE - QUADRIVALENT - HIGH DOSE (65+) PRESERVATIVE FREE IM: ICD-10-PCS | Mod: S$GLB,,, | Performed by: INTERNAL MEDICINE

## 2022-12-07 PROCEDURE — 3078F DIAST BP <80 MM HG: CPT | Mod: CPTII,S$GLB,, | Performed by: INTERNAL MEDICINE

## 2022-12-07 PROCEDURE — G0008 FLU VACCINE - QUADRIVALENT - HIGH DOSE (65+) PRESERVATIVE FREE IM: ICD-10-PCS | Mod: S$GLB,,, | Performed by: INTERNAL MEDICINE

## 2022-12-07 PROCEDURE — 99214 PR OFFICE/OUTPT VISIT, EST, LEVL IV, 30-39 MIN: ICD-10-PCS | Mod: 25,S$GLB,, | Performed by: INTERNAL MEDICINE

## 2022-12-07 PROCEDURE — 3061F PR NEG MICROALBUMINURIA RESULT DOCUMENTED/REVIEW: ICD-10-PCS | Mod: CPTII,S$GLB,, | Performed by: INTERNAL MEDICINE

## 2022-12-07 PROCEDURE — 1101F PT FALLS ASSESS-DOCD LE1/YR: CPT | Mod: CPTII,S$GLB,, | Performed by: INTERNAL MEDICINE

## 2022-12-07 PROCEDURE — 1159F MED LIST DOCD IN RCRD: CPT | Mod: CPTII,S$GLB,, | Performed by: INTERNAL MEDICINE

## 2022-12-07 PROCEDURE — 3008F BODY MASS INDEX DOCD: CPT | Mod: CPTII,S$GLB,, | Performed by: INTERNAL MEDICINE

## 2022-12-07 PROCEDURE — G0008 ADMIN INFLUENZA VIRUS VAC: HCPCS | Mod: S$GLB,,, | Performed by: INTERNAL MEDICINE

## 2022-12-07 PROCEDURE — 3066F PR DOCUMENTATION OF TREATMENT FOR NEPHROPATHY: ICD-10-PCS | Mod: CPTII,S$GLB,, | Performed by: INTERNAL MEDICINE

## 2022-12-07 PROCEDURE — 99214 OFFICE O/P EST MOD 30 MIN: CPT | Mod: 25,S$GLB,, | Performed by: INTERNAL MEDICINE

## 2022-12-07 PROCEDURE — 1101F PR PT FALLS ASSESS DOC 0-1 FALLS W/OUT INJ PAST YR: ICD-10-PCS | Mod: CPTII,S$GLB,, | Performed by: INTERNAL MEDICINE

## 2022-12-07 RX ORDER — METFORMIN HYDROCHLORIDE 850 MG/1
850 TABLET ORAL 2 TIMES DAILY
Qty: 180 TABLET | Refills: 1 | Status: SHIPPED | OUTPATIENT
Start: 2022-12-07 | End: 2023-02-08

## 2022-12-08 LAB
BUN SERPL-MCNC: 17 MG/DL (ref 8–27)
BUN/CREAT SERPL: 17 (ref 10–24)
CALCIUM SERPL-MCNC: 10 MG/DL (ref 8.6–10.2)
CHLORIDE SERPL-SCNC: 94 MMOL/L (ref 96–106)
CO2 SERPL-SCNC: 26 MMOL/L (ref 20–29)
CREAT SERPL-MCNC: 1.01 MG/DL (ref 0.76–1.27)
EST. GFR  (NO RACE VARIABLE): 82 ML/MIN/1.73
GLUCOSE SERPL-MCNC: 195 MG/DL (ref 70–99)
POTASSIUM SERPL-SCNC: 5.3 MMOL/L (ref 3.5–5.2)
SODIUM SERPL-SCNC: 134 MMOL/L (ref 134–144)

## 2022-12-12 ENCOUNTER — TELEPHONE (OUTPATIENT)
Dept: FAMILY MEDICINE | Facility: CLINIC | Age: 67
End: 2022-12-12

## 2022-12-12 DIAGNOSIS — E87.5 HYPERKALEMIA: Primary | ICD-10-CM

## 2022-12-12 NOTE — TELEPHONE ENCOUNTER
----- Message from Salazar Campbell MD sent at 12/11/2022  7:49 PM CST -----  Notify patient that I got his labs.  Blood sugar are elevated at 195 as expected.  His potassium level is still elevated at 5.3.  I would like him to stop the hydrochlorothiazide and please pend basic metabolic panel for 2 weeks again.  The potassium is better than 5.7 last time and 6.21 year back.  Is he taking any potassium salts?

## 2022-12-12 NOTE — TELEPHONE ENCOUNTER
Spoke to patient's wife Sharyn regarding lab results. Patient is not taking potassium salts. Wife states she will advise patient to stop taking hydrochlorothiazide. He will have labs redrawn in 2 weeks. Pended labs to preferred lab.

## 2022-12-12 NOTE — PROGRESS NOTES
Notify patient that I got his labs.  Blood sugar are elevated at 195 as expected.  His potassium level is still elevated at 5.3.  I would like him to stop the hydrochlorothiazide and please pend basic metabolic panel for 2 weeks again.  The potassium is better than 5.7 last time and 6.21 year back.  Is he taking any potassium salts?

## 2022-12-30 LAB
BUN SERPL-MCNC: 14 MG/DL (ref 8–27)
BUN/CREAT SERPL: 11 (ref 10–24)
CALCIUM SERPL-MCNC: 9.7 MG/DL (ref 8.6–10.2)
CHLORIDE SERPL-SCNC: 98 MMOL/L (ref 96–106)
CO2 SERPL-SCNC: 22 MMOL/L (ref 20–29)
CREAT SERPL-MCNC: 1.28 MG/DL (ref 0.76–1.27)
EST. GFR  (NO RACE VARIABLE): 61 ML/MIN/1.73
GLUCOSE SERPL-MCNC: 183 MG/DL (ref 70–99)
POTASSIUM SERPL-SCNC: 5.3 MMOL/L (ref 3.5–5.2)
SODIUM SERPL-SCNC: 137 MMOL/L (ref 134–144)

## 2023-02-08 ENCOUNTER — TELEPHONE (OUTPATIENT)
Dept: FAMILY MEDICINE | Facility: CLINIC | Age: 68
End: 2023-02-08

## 2023-02-08 DIAGNOSIS — E11.9 TYPE 2 DIABETES MELLITUS WITHOUT COMPLICATION, WITHOUT LONG-TERM CURRENT USE OF INSULIN: ICD-10-CM

## 2023-02-08 RX ORDER — METFORMIN HYDROCHLORIDE 500 MG/1
1000 TABLET ORAL 2 TIMES DAILY
Qty: 360 TABLET | Refills: 3 | Status: SHIPPED | OUTPATIENT
Start: 2023-02-08 | End: 2024-03-18

## 2023-02-08 NOTE — TELEPHONE ENCOUNTER
Patient's wife (Sharyn [phone]) called to clarify the dosage of metformin. States it was supposed to be increased. He was previously taking 2,000 mg bid but is now taking 850 mg bid per the new rx. Since starting the lower dose his neuropathy is worsening in feet.     Type 2 diabetes mellitus without complication, without long-term current use of insulin  -     metFORMIN (GLUCOPHAGE) 500 MG tablet; Take 2 tablets (1,000 mg total) by mouth 2 (two) times daily.  Dispense: 360 tablet; Refill: 3

## 2023-03-17 NOTE — PATIENT INSTRUCTIONS
Using a Blood Sugar Log    You have diabetes. This means your body has trouble regulating a sugar called glucose. To help manage your diabetes, youll need to check your blood sugar level as directed by your healthcare provider. Keeping a log of your blood sugar levels will help you track your blood sugar readings. Its a simple and easy way to see how well you are controlling your diabetes.  Checking your blood sugar level  You can check your blood sugar level with a blood glucose meter. Youll first prick the side of your finger with a tiny lancet to draw a tiny drop of blood onto the test strip. Some glucose meters let you use another place on your body to test. But these other places should not be used in some cases as they may be inaccurate. Follow the instructions for your glucose meter. And talk with your healthcare provider before doing the test on other places.  The strip goes into the meter first, then a drop of blood is placed on the tip of the strip. The meter then shows a reading that tells you the level of your blood sugar. Your readings should be in your target range as often as possible. This means not too high or too low. Staying in this range helps lower your risk for complications. Your healthcare provider will help you figure out the target range that is best for you.  Tracking your readings  Every time you check your blood sugar, use your log to keep track of your readings. Your meter will also probably have a memory feature that your healthcare provider can check at your next visit. You may be advised by your healthcare provider to check your blood sugar in the morning, at bedtime, and before and after meals. Be sure to write down all of your numbers. Also use your log to record things that might have affected your blood sugar. Some examples include being sick, certain medicines, being physically active, feeling stressed, or skipping meals.   Lessons learned from your readings  Tracking your  blood sugar readings helps you see patterns. These patterns tell you how your actions affect your blood sugar. For instance, you may have higher numbers after eating certain foods or lower numbers after exercise. They just help you understand how to stay in your target range more often, so that your diabetes remains in good control.  Sharing your log with your healthcare team  Bring your blood sugar log and glucose meter with you to all of your healthcare appointments. This can help your healthcare team make changes to your treatment plan, if needed. This may involve making changes in what you eat, what medicines you take, or how much you exercise.  To learn more  The resources below can help you learn more:  · American Diabetes Association 780-571-8042 www.diabetes.org  · [employer] 116-548-8880 www.lighthouse.org  · [employer] 405-684-8470 www.nei.nih.gov  · [employer] 586-603-2958 www.hormone.org  Date Last Reviewed: 5/1/2016  © 4436-9497 [employer]. [address] [place] [address], [place] [address]. All rights reserved. This information is not intended as a substitute for professional medical care. Always follow your healthcare professional's instructions.         yes

## 2023-03-21 ENCOUNTER — TELEPHONE (OUTPATIENT)
Dept: FAMILY MEDICINE | Facility: CLINIC | Age: 68
End: 2023-03-21

## 2023-03-22 NOTE — TELEPHONE ENCOUNTER
Attempted to contact patient regarding medication coverage issue. Left voicemail requesting call back.

## 2023-03-23 NOTE — TELEPHONE ENCOUNTER
Patient's wife said it is ok to change the prescription for glipizide.    She also states that his metformin was supposed to have been increase in December but it was not. She has been giving the patient 1500 mg bid. His blood sugars are staying normal now.

## 2023-03-31 NOTE — PROGRESS NOTES
Subjective:       Patient ID: Mg Torre is a 68 y.o. male.    Chief Complaint: Diabetes, Hyperlipidemia, Hypertension, Abnormal labs (Persistent hyperkalemia for quite some time and hypercalcemia), Psoriasis, and Immunization counseling    Mr. Mg Torre is a 68-year-old gentleman who comes for follow-up.  He is a recently retired .      Medical issues are as below:-    1. Type 2 diabetes mellitus currently on metformin injection Trulicity weekly with   2.-dyslipidemia  3.-hypertension  4.-longstanding underlying psoriasis  5.-history of COVID-19.      Control of diabetes has been fluctuant and patchy with some good phases in past and some equally bad phases in past.    During his elier.  It was difficult to be aggressive for fear of hypoglycemia.    After [facility] we had put at home Trulicity.      He has also gone through phases of changes in insurance is and limited ability to afford brand name medications.      Medications:- This was discussed with patient's wife.  Please do not metformin more than 1000 mg in the morning and 1000 mg in the evening.  Glipizide maybe medication we may want to discontinue given his age above 65.Consider increasing Trulicity to 3 mg at his visit.  Keep hydrated.  Potassium remains eye.  Family states they do not take any particular different type of salt.  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    Psoriasis:-this has been going on for several decades and is probably hereditary.  Thus far he has been avoiding any treatment.  Intermittently he itches which can be socially embarrassing.    Diabetes  He presents for his follow-up diabetic visit. He has type 2 diabetes mellitus. No MedicAlert identification noted. The initial diagnosis of diabetes was made 20 years ago. His disease course has been worsening (Recent hemoglobin A1c 6.8.). Pertinent negatives for hypoglycemia include no confusion, dizziness, headaches, nervousness/anxiousness, pallor,  seizures or speech difficulty. Pertinent negatives for diabetes include no chest pain, no fatigue, no polydipsia, no polyphagia, no polyuria and no weakness. There are no hypoglycemic complications. Symptoms are worsening. Pertinent negatives for diabetic complications include no autonomic neuropathy, CVA or PVD. Risk factors for coronary artery disease include dyslipidemia, diabetes mellitus, male sex, hypertension and sedentary lifestyle. Current diabetic treatment includes oral agent (dual therapy) (Recently changed insurance and he had a gap between feeling for Trulicity..). He is compliant with treatment some of the time. His weight is increasing steadily (191-197 lbs). Meal planning includes avoidance of concentrated sweets. He has not had a previous visit with a dietitian. His home blood glucose trend is increasing steadily. His breakfast blood glucose range is generally 140-180 mg/dl. An ACE inhibitor/angiotensin II receptor blocker is contraindicated (Intolerant to Ace and ARB-hyperkalemia). He does not see a podiatrist.Eye exam is not current.   Hypertension  This is a chronic problem. The current episode started more than 1 year ago. The problem is unchanged. The problem is controlled. Pertinent negatives include no chest pain, headaches, malaise/fatigue, neck pain, palpitations or shortness of breath. There are no associated agents to hypertension. Risk factors for coronary artery disease include male gender, dyslipidemia and diabetes mellitus. Past treatments include calcium channel blockers and diuretics. The current treatment provides moderate improvement. Compliance problems include psychosocial issues.  There is no history of angina, CVA, heart failure or PVD. There is no history of chronic renal disease, coarctation of the aorta, hyperaldosteronism, hypercortisolism, pheochromocytoma, renovascular disease or sleep apnea.   Hyperlipidemia  This is a chronic problem. The current episode started more  than 1 year ago. He has no history of chronic renal disease, diabetes, hypothyroidism, liver disease or obesity. Pertinent negatives include no chest pain or shortness of breath. Current antihyperlipidemic treatment includes statins. The current treatment provides moderate improvement of lipids. Compliance problems include psychosocial issues.  Risk factors for coronary artery disease include hypertension, male sex, dyslipidemia, a sedentary lifestyle and diabetes mellitus.   Other  This is a recurrent (Hyperkalemia chronic and now hypercalcemia) problem. The current episode started more than 1 year ago. The problem has been gradually worsening. Associated symptoms include arthralgias (hand pains) and a rash (psoriasis). Pertinent negatives include no abdominal pain, chest pain, chills, congestion, coughing, diaphoresis, fatigue, fever, headaches, joint swelling, neck pain, numbness, vomiting or weakness.     Past Medical History:   Diagnosis Date    Allergy     pcn    Diabetes mellitus     Diabetes mellitus, type 2     Hyperlipidemia     Hypertension     Lab test positive for detection of COVID-19 virus 8/10/2021    Patient had tested himself at urgent care in [facility].  Minimally symptomatic at this point.  Continue with precautions.  Patient's son is positive with significant problems.     Social History     Socioeconomic History    Marital status:      Spouse name: Sharyn Torre    Number of children: 2   Occupational History    Occupation:    Tobacco Use    Smoking status: Never    Smokeless tobacco: Never   Substance and Sexual Activity    Alcohol use: Yes    Drug use: No    Sexual activity: Yes     Partners: Female   Social History Narrative    Together 2 children- raised 6 total with Ms Coles     Social Determinants of Health     Financial Resource Strain: Medium Risk    Difficulty of Paying Living Expenses: Somewhat hard   Food Insecurity: No Food Insecurity    Worried  About Running Out of Food in the Last Year: Never true    Ran Out of Food in the Last Year: Never true   Transportation Needs: No Transportation Needs    Lack of Transportation (Medical): No    Lack of Transportation (Non-Medical): No   Physical Activity: Inactive    Days of Exercise per Week: 0 days    Minutes of Exercise per Session: 0 min   Stress: No Stress Concern Present    Feeling of Stress : Only a little   Social Connections: Moderately Integrated    Frequency of Communication with Friends and Family: Three times a week    Frequency of Social Gatherings with Friends and Family: Three times a week    Attends [employer] Services: 1 to 4 times per year    Active Member of Clubs or Organizations: No    Attends Club or Organization Meetings: Never    Marital Status:    Housing Stability: Low Risk     Unable to Pay for Housing in the Last Year: No    Number of Places Lived in the Last Year: 1    Unstable Housing in the Last Year: No     Past Surgical History:   Procedure Laterality Date    APPENDECTOMY      SPINE SURGERY       Family History   Problem Relation Age of Onset    Heart disease Father     Obesity Son        Review of Systems   Constitutional:  Positive for activity change (Retired  now.  Life is on a slower brendan now.). Negative for appetite change, chills, diaphoresis, fatigue, fever, malaise/fatigue and unexpected weight change (gained 5 lbs).   HENT:  Negative for congestion, hearing loss, rhinorrhea, sneezing and trouble swallowing.         Sinus problems.   Eyes:  Negative for pain, discharge, redness and visual disturbance.   Respiratory:  Negative for cough, chest tightness, shortness of breath and wheezing.    Cardiovascular:  Negative for chest pain, palpitations and leg swelling.        Borderline hypertension.   Gastrointestinal:  Negative for abdominal distention, abdominal pain, blood in stool, constipation, diarrhea and vomiting.   Endocrine: Negative for cold  "intolerance, heat intolerance, polydipsia, polyphagia and polyuria.        Diabetes mellitus.  Showing worsening with recent hemoglobin A1c of > 8.0   Genitourinary:  Negative for difficulty urinating, dysuria, frequency, hematuria and urgency.        No significant prostate symptoms   Musculoskeletal:  Positive for arthralgias (hand pains). Negative for back pain, gait problem, joint swelling and neck pain.        Patient is status post right-sided hip arthroplasty and is recovering gradually with acceptable range of motion and quality of life.   Skin:  Positive for rash (psoriasis). Negative for color change, pallor and wound.        Extensive psoriasis on the skin especially in the trunk and back.  Also has spots of vitiligo on the back and also on the hands..   Neurological:  Negative for dizziness, seizures, speech difficulty, weakness, numbness and headaches.        Last year he had complained of tingling and numbness in the left hand and he is doing okay now.  Probably it was due to prolonged .   Psychiatric/Behavioral:  Negative for agitation, behavioral problems, confusion and dysphoric mood. The patient is not nervous/anxious.         Euthymic mostly.       Objective:      Blood pressure 136/81, pulse 88, height 5' 8" (1.727 m), weight 88.5 kg (195 lb). Body mass index is 29.65 kg/m².  Physical Exam  Constitutional:       General: He is not in acute distress.     Appearance: He is well-developed. He is not ill-appearing, toxic-appearing or diaphoretic.      Comments: BMI is 29.65   HENT:      Head: Normocephalic and atraumatic.      Mouth/Throat:      Comments: Patient is wearing a facial mask.  COVID-19  Eyes:      General: No scleral icterus.        Right eye: No discharge.         Left eye: No discharge.   Neck:      Thyroid: No thyromegaly.      Vascular: No JVD.      Trachea: No tracheal deviation.   Cardiovascular:      Rate and Rhythm: Normal rate and regular rhythm.      Heart sounds: " Normal heart sounds. No murmur heard.  Pulmonary:      Effort: No respiratory distress.      Breath sounds: Normal breath sounds. No stridor.   Abdominal:      General: There is no distension.      Palpations: Abdomen is soft.      Tenderness: There is no abdominal tenderness. There is no rebound.   Musculoskeletal:         General: No tenderness or deformity.      Cervical back: No tenderness.      Right lower leg: No edema.      Left lower leg: No edema.        Feet:    Feet:      Right foot:      Protective Sensation: 5 sites tested.  5 sites sensed.      Skin integrity: Callus present.      Toenail Condition: Right toenails are long.      Left foot:      Protective Sensation: 5 sites tested.  5 sites sensed.      Skin integrity: Callus present.      Toenail Condition: Left toenails are long.      Comments: Calluses  Lymphadenopathy:      Cervical: No cervical adenopathy.   Skin:     General: Skin is dry.      Findings: Rash (psoriasis) present.      Comments: Extensive psoriasis noted on the skin in the trunk and back.  Vitiligo on the upper shoulder and upper back.  This runs in the family including mother and perhaps 1 of the sisters.  Also patient's son has it.   Neurological:      Mental Status: He is alert. Mental status is at baseline.   Psychiatric:         Behavior: Behavior normal.         Assessment:       1. Type 2 diabetes mellitus without complication, without long-term current use of insulin    2. Hyperkalemia    3. Mixed dyslipidemia    4. Essential hypertension    5. Psoriasis    6. Hypercalcemia    7. Pneumococcal vaccine administered           No visits with results within 3 Month(s) from this visit.   Latest known visit with results is:   Telephone on 12/12/2022   Component Date Value Ref Range Status    Glucose 04/07/2023 164 (H)  70 - 99 mg/dL Final    BUN 04/07/2023 19  8 - 27 mg/dL Final    Creatinine 04/07/2023 1.11  0.76 - 1.27 mg/dL Final    eGFR 04/07/2023 72  >59 mL/min/1.73 Final     BUN/Creatinine Ratio 04/07/2023 17  10 - 24 Final    Sodium 04/07/2023 137  134 - 144 mmol/L Final    Potassium 04/07/2023 5.8 (H)  3.5 - 5.2 mmol/L Final    Chloride 04/07/2023 97  96 - 106 mmol/L Final    CO2 04/07/2023 25  20 - 29 mmol/L Final    Calcium 04/07/2023 11.0 (H)  8.6 - 10.2 mg/dL Final         Plan:           Type 2 diabetes mellitus without complication, without long-term current use of insulin  Comments:  Reason Trulicity to 3 mg.  Check A1c level.  Discontinue glipizide at this point.  Continue metformin but may discontinue that also.  Orders:  -     dulaglutide (TRULICITY) 3 mg/0.5 mL pen injector; Inject 3 mg into the skin every 7 days. Dosage increased to 3 mg  Dispense: 4 pen; Refill: 11  -     Hemoglobin A1C; Future; Expected date: 04/12/2023  -     Ambulatory referral/consult to Nephrology; Future; Expected date: 04/26/2023    Hyperkalemia  -     Ambulatory referral/consult to Nephrology; Future; Expected date: 04/26/2023    Mixed dyslipidemia    Essential hypertension    Psoriasis  -     Ambulatory referral/consult to Dermatology; Future; Expected date: 05/12/2023    Hypercalcemia  -     PTH, intact; Future; Expected date: 04/12/2023  -     Calcium, Ionized; Future; Expected date: 04/12/2023  -     Vitamin D; Future; Expected date: 04/12/2023  -     Albumin; Future; Expected date: 04/12/2023    Pneumococcal vaccine administered  -     (In Office Administered) Pneumococcal Conjugate Vaccine (20 Valent) (IM)      Patient's medical conditions have been reviewed.      Diabetes control continues to be less than stellar and at this point will increase injection Trulicity to 3 mg per week.  They ask about injection Ozempic now.  I have advised him that Trulicity and Ozempic belongs to the same class and will have same benefits.  Ozempic may run the risk of non coverage and also intermittent shortage is because of diversion of this medication.    DISCONTINUE GLIPIZIDE NOW.  CONSIDERED TO BE AT  HIGH-RISK MEDICATION FOR PATIENT'S ABOVE 65.    Continue with metformin for the time being till we are sure about the effects of Trulicity and may consider discontinuing.      I read the side effects of GLP 1 agonist which may increase the risk for medullary thyroid cancers or pancreatitis and currently he has no similar symptoms.      The cause of hypercalcemia is uncertain and so as a cause of hyperkalemia.  He is not on any ACE inhibitors or ARB ease.  He is not taking any potassium supplement though he loves his peanuts in the shell and peanut butter also.  I have advised him again to cut down on the peanuts.      I will check intact PTH, ionized calcium, albumin level.    The possibility of hyperkalemia secondary to distal renal tubular acidosis type 1 with Nephrology colleagues concerning the same subject insurance approval.    Follow-up in 2 or 3 months time to review situation.    Today he gets Prevnar 20 thus completing the series of pneumonia vaccinations over life.      Continue with COVID precautions.    Spent julee 35 minutes with patient which involved review of pts medical conditions, labs, medications and with 50% of time face-to-face discussion about medical problems, management and any applicable changes.      Current Outpatient Medications:     amLODIPine (NORVASC) 10 MG tablet, TAKE ONE TABLET BY MOUTH EVERY DAY, Disp: 90 tablet, Rfl: 1    aspirin 81 MG Chew, 1 tablet once daily., Disp: , Rfl:     atorvastatin (LIPITOR) 40 MG tablet, TAKE ONE TABLET BY MOUTH EVERY MORNING, Disp: 90 tablet, Rfl: 3    cetirizine 10 mg Cap, Take 1 tablet by mouth once daily., Disp: , Rfl:     metFORMIN (GLUCOPHAGE) 500 MG tablet, Take 2 tablets (1,000 mg total) by mouth 2 (two) times daily., Disp: 360 tablet, Rfl: 3    turmeric root extract 500 mg Cap, Take 1 tablet by mouth 2 (two) times daily., Disp: , Rfl:     vit C/E/Zn/coppr/lutein/zeaxan (PRESERVISION AREDS-2 ORAL), , Disp: , Rfl:     dulaglutide (TRULICITY) 3  mg/0.5 mL pen injector, Inject 3 mg into the skin every 7 days. Dosage increased to 3 mg, Disp: 4 pen, Rfl: 11

## 2023-03-31 NOTE — TELEPHONE ENCOUNTER
This was discussed with patient's wife.  Please do not metformin more than our mg in the morning and 1000 mg in the evening.  Glipizide maybe medication we may want to discontinue given his age above 65.Consider increasing Trulicity to 3 mg at his visit.  Keep hydrated.  Potassium remains eye.  Family states they do not take any particular different type of salt.  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

## 2023-04-08 LAB
BUN SERPL-MCNC: 19 MG/DL (ref 8–27)
BUN/CREAT SERPL: 17 (ref 10–24)
CALCIUM SERPL-MCNC: 11 MG/DL (ref 8.6–10.2)
CHLORIDE SERPL-SCNC: 97 MMOL/L (ref 96–106)
CO2 SERPL-SCNC: 25 MMOL/L (ref 20–29)
CREAT SERPL-MCNC: 1.11 MG/DL (ref 0.76–1.27)
EST. GFR  (NO RACE VARIABLE): 72 ML/MIN/1.73
GLUCOSE SERPL-MCNC: 164 MG/DL (ref 70–99)
POTASSIUM SERPL-SCNC: 5.8 MMOL/L (ref 3.5–5.2)
SODIUM SERPL-SCNC: 137 MMOL/L (ref 134–144)

## 2023-04-12 ENCOUNTER — TELEPHONE (OUTPATIENT)
Dept: DERMATOLOGY | Facility: CLINIC | Age: 68
End: 2023-04-12
Payer: MEDICARE

## 2023-04-12 ENCOUNTER — OFFICE VISIT (OUTPATIENT)
Dept: FAMILY MEDICINE | Facility: CLINIC | Age: 68
End: 2023-04-12
Payer: MEDICARE

## 2023-04-12 VITALS
SYSTOLIC BLOOD PRESSURE: 136 MMHG | HEIGHT: 68 IN | DIASTOLIC BLOOD PRESSURE: 81 MMHG | BODY MASS INDEX: 29.55 KG/M2 | HEART RATE: 88 BPM | WEIGHT: 195 LBS

## 2023-04-12 DIAGNOSIS — E83.52 HYPERCALCEMIA: ICD-10-CM

## 2023-04-12 DIAGNOSIS — L40.9 PSORIASIS: ICD-10-CM

## 2023-04-12 DIAGNOSIS — E78.2 MIXED DYSLIPIDEMIA: ICD-10-CM

## 2023-04-12 DIAGNOSIS — I10 ESSENTIAL HYPERTENSION: ICD-10-CM

## 2023-04-12 DIAGNOSIS — E87.5 HYPERKALEMIA: ICD-10-CM

## 2023-04-12 DIAGNOSIS — E11.9 TYPE 2 DIABETES MELLITUS WITHOUT COMPLICATION, WITHOUT LONG-TERM CURRENT USE OF INSULIN: Primary | ICD-10-CM

## 2023-04-12 DIAGNOSIS — Z23 PNEUMOCOCCAL VACCINE ADMINISTERED: ICD-10-CM

## 2023-04-12 PROCEDURE — 90677 PCV20 VACCINE IM: CPT | Mod: S$GLB,,, | Performed by: INTERNAL MEDICINE

## 2023-04-12 PROCEDURE — 3288F PR FALLS RISK ASSESSMENT DOCUMENTED: ICD-10-PCS | Mod: CPTII,S$GLB,, | Performed by: INTERNAL MEDICINE

## 2023-04-12 PROCEDURE — 3079F PR MOST RECENT DIASTOLIC BLOOD PRESSURE 80-89 MM HG: ICD-10-PCS | Mod: CPTII,S$GLB,, | Performed by: INTERNAL MEDICINE

## 2023-04-12 PROCEDURE — 1160F PR REVIEW ALL MEDS BY PRESCRIBER/CLIN PHARMACIST DOCUMENTED: ICD-10-PCS | Mod: CPTII,S$GLB,, | Performed by: INTERNAL MEDICINE

## 2023-04-12 PROCEDURE — 1126F PR PAIN SEVERITY QUANTIFIED, NO PAIN PRESENT: ICD-10-PCS | Mod: CPTII,S$GLB,, | Performed by: INTERNAL MEDICINE

## 2023-04-12 PROCEDURE — 3008F PR BODY MASS INDEX (BMI) DOCUMENTED: ICD-10-PCS | Mod: CPTII,S$GLB,, | Performed by: INTERNAL MEDICINE

## 2023-04-12 PROCEDURE — 3008F BODY MASS INDEX DOCD: CPT | Mod: CPTII,S$GLB,, | Performed by: INTERNAL MEDICINE

## 2023-04-12 PROCEDURE — 90677 PNEUMOCOCCAL CONJUGATE VACCINE 20-VALENT: ICD-10-PCS | Mod: S$GLB,,, | Performed by: INTERNAL MEDICINE

## 2023-04-12 PROCEDURE — 99214 OFFICE O/P EST MOD 30 MIN: CPT | Mod: S$GLB,,, | Performed by: INTERNAL MEDICINE

## 2023-04-12 PROCEDURE — 3075F PR MOST RECENT SYSTOLIC BLOOD PRESS GE 130-139MM HG: ICD-10-PCS | Mod: CPTII,S$GLB,, | Performed by: INTERNAL MEDICINE

## 2023-04-12 PROCEDURE — 1101F PT FALLS ASSESS-DOCD LE1/YR: CPT | Mod: CPTII,S$GLB,, | Performed by: INTERNAL MEDICINE

## 2023-04-12 PROCEDURE — 3075F SYST BP GE 130 - 139MM HG: CPT | Mod: CPTII,S$GLB,, | Performed by: INTERNAL MEDICINE

## 2023-04-12 PROCEDURE — 99214 PR OFFICE/OUTPT VISIT, EST, LEVL IV, 30-39 MIN: ICD-10-PCS | Mod: S$GLB,,, | Performed by: INTERNAL MEDICINE

## 2023-04-12 PROCEDURE — 1159F PR MEDICATION LIST DOCUMENTED IN MEDICAL RECORD: ICD-10-PCS | Mod: CPTII,S$GLB,, | Performed by: INTERNAL MEDICINE

## 2023-04-12 PROCEDURE — G0009 ADMIN PNEUMOCOCCAL VACCINE: HCPCS | Mod: S$GLB,,, | Performed by: INTERNAL MEDICINE

## 2023-04-12 PROCEDURE — 1126F AMNT PAIN NOTED NONE PRSNT: CPT | Mod: CPTII,S$GLB,, | Performed by: INTERNAL MEDICINE

## 2023-04-12 PROCEDURE — 1160F RVW MEDS BY RX/DR IN RCRD: CPT | Mod: CPTII,S$GLB,, | Performed by: INTERNAL MEDICINE

## 2023-04-12 PROCEDURE — G0009 PNEUMOCOCCAL CONJUGATE VACCINE 20-VALENT: ICD-10-PCS | Mod: S$GLB,,, | Performed by: INTERNAL MEDICINE

## 2023-04-12 PROCEDURE — 3079F DIAST BP 80-89 MM HG: CPT | Mod: CPTII,S$GLB,, | Performed by: INTERNAL MEDICINE

## 2023-04-12 PROCEDURE — 1101F PR PT FALLS ASSESS DOC 0-1 FALLS W/OUT INJ PAST YR: ICD-10-PCS | Mod: CPTII,S$GLB,, | Performed by: INTERNAL MEDICINE

## 2023-04-12 PROCEDURE — 1159F MED LIST DOCD IN RCRD: CPT | Mod: CPTII,S$GLB,, | Performed by: INTERNAL MEDICINE

## 2023-04-12 PROCEDURE — 3288F FALL RISK ASSESSMENT DOCD: CPT | Mod: CPTII,S$GLB,, | Performed by: INTERNAL MEDICINE

## 2023-04-12 RX ORDER — DULAGLUTIDE 3 MG/.5ML
3 INJECTION, SOLUTION SUBCUTANEOUS
Qty: 4 PEN | Refills: 11 | Status: SHIPPED | OUTPATIENT
Start: 2023-04-12 | End: 2023-07-27

## 2023-04-13 LAB
25(OH)D3+25(OH)D2 SERPL-MCNC: 43.7 NG/ML (ref 30–100)
ALBUMIN SERPL-MCNC: 4.7 G/DL (ref 3.8–4.8)
CA-I SERPL ISE-MCNC: 5.2 MG/DL (ref 4.5–5.6)
HBA1C MFR BLD: 6.9 % (ref 4.8–5.6)
PTH-INTACT SERPL-MCNC: 21 PG/ML (ref 15–65)

## 2023-05-26 DIAGNOSIS — I10 BENIGN ESSENTIAL HYPERTENSION: ICD-10-CM

## 2023-05-27 RX ORDER — AMLODIPINE BESYLATE 10 MG/1
TABLET ORAL
Qty: 90 TABLET | Refills: 1 | Status: SHIPPED | OUTPATIENT
Start: 2023-05-27 | End: 2023-11-15

## 2023-07-19 LAB — HBA1C MFR BLD: 8.9 % (ref ?–8.9)

## 2023-07-20 LAB — HBA1C MFR BLD: 8.9 % (ref 4.8–5.6)

## 2023-07-23 NOTE — PROGRESS NOTES
Subjective:       Patient ID: Mg Torre is a 68 y.o. male.    Chief Complaint: Diabetes, Hypertension, Hyperlipidemia, and abnormal creatinine    Mr. Mg Torre is a 68-year-old gentleman who comes for 3 months follow-up.  He is a recently retired .      Medical issues are as below:-    1. Type 2 diabetes mellitus currently on metformin injection Trulicity weekly with   2.-dyslipidemia  3.-hypertension  4.-longstanding underlying psoriasis  5.-history of COVID-19.      Control of diabetes has been fluctuant and patchy with some good phases in past and some equally bad phases in past.  Recent A1c level has shows a rise again indicating poor control    During his elier.  It was difficult to be aggressive for fear of hypoglycemia.    Currently he is on Trulicity at 3 mg per week.  He is also on metformin.  Previously he was on glipizide which was discontinued secondary to crossing age above 65 and further risks for this population.    He continues with atorvastatin cholesterol, amlodipine for blood pressure.  He is not on Ace or ARB.  Previous intolerance to losartan has been noted.  Hyperkalemia and elevated creatinine were noted.    He has also gone through phases of changes in insurance is and limited ability to afford brand name medications.      Psoriasis:-this has been going on for several decades and is probably hereditary.  Thus far he has been avoiding any treatment.  Intermittently he itches which can be socially embarrassing.    Diabetes  He presents for his follow-up diabetic visit. He has type 2 diabetes mellitus. No MedicAlert identification noted. The initial diagnosis of diabetes was made 20 years ago. His disease course has been worsening (Recent hemoglobin A1c 6.8.). Pertinent negatives for hypoglycemia include no confusion, dizziness, headaches, nervousness/anxiousness, pallor, seizures or speech difficulty. Pertinent negatives for diabetes include no chest pain, no  fatigue, no polydipsia, no polyphagia, no polyuria and no weakness. There are no hypoglycemic complications. Symptoms are worsening. Pertinent negatives for diabetic complications include no autonomic neuropathy, CVA or PVD. Risk factors for coronary artery disease include dyslipidemia, diabetes mellitus, male sex, hypertension and sedentary lifestyle. Current diabetic treatment includes oral agent (dual therapy) (Recently changed insurance and he had a gap between feeling for Trulicity..). He is compliant with treatment some of the time. His weight is increasing steadily (191-197 lbs). Meal planning includes avoidance of concentrated sweets. He has not had a previous visit with a dietitian. His home blood glucose trend is increasing steadily. His breakfast blood glucose range is generally 140-180 mg/dl. An ACE inhibitor/angiotensin II receptor blocker is contraindicated (Intolerant to Ace and ARB-hyperkalemia). He does not see a podiatrist.Eye exam is not current.   Hypertension  This is a chronic problem. The current episode started more than 1 year ago. The problem is unchanged. The problem is controlled. Pertinent negatives include no chest pain, headaches, malaise/fatigue, neck pain, palpitations or shortness of breath. There are no associated agents to hypertension. Risk factors for coronary artery disease include male gender, dyslipidemia and diabetes mellitus. Past treatments include calcium channel blockers and diuretics. The current treatment provides moderate improvement. Compliance problems include psychosocial issues.  There is no history of angina, CVA, heart failure or PVD. There is no history of chronic renal disease, coarctation of the aorta, hyperaldosteronism, hypercortisolism, pheochromocytoma, renovascular disease or sleep apnea.   Hyperlipidemia  This is a chronic problem. The current episode started more than 1 year ago. He has no history of chronic renal disease, diabetes, hypothyroidism,  liver disease or obesity. Pertinent negatives include no chest pain or shortness of breath. Current antihyperlipidemic treatment includes statins. The current treatment provides moderate improvement of lipids. Compliance problems include psychosocial issues.  Risk factors for coronary artery disease include hypertension, male sex, dyslipidemia, a sedentary lifestyle and diabetes mellitus.   Other  This is a recurrent (Hyperkalemia chronic and now hypercalcemia) problem. The current episode started more than 1 year ago. The problem has been gradually worsening. Associated symptoms include arthralgias (hand pains) and a rash (psoriasis). Pertinent negatives include no abdominal pain, chest pain, chills, congestion, coughing, diaphoresis, fatigue, fever, headaches, joint swelling, neck pain, numbness, vomiting or weakness.     Past Medical History:   Diagnosis Date    Allergy     pcn    Diabetes mellitus     Diabetes mellitus, type 2     Hyperlipidemia     Hypertension     Lab test positive for detection of COVID-19 virus 8/10/2021    Patient had tested himself at urgent care in [facility].  Minimally symptomatic at this point.  Continue with precautions.  Patient's son is positive with significant problems.     Social History     Socioeconomic History    Marital status:      Spouse name: Sharyn Torre    Number of children: 2   Occupational History    Occupation:    Tobacco Use    Smoking status: Never    Smokeless tobacco: Never   Substance and Sexual Activity    Alcohol use: Yes    Drug use: No    Sexual activity: Yes     Partners: Female   Social History Narrative    Together 2 children- raised 6 total with Ms Coles     Past Surgical History:   Procedure Laterality Date    APPENDECTOMY      SPINE SURGERY       Family History   Problem Relation Age of Onset    Heart disease Father     Obesity Son        Review of Systems   Constitutional:  Positive for activity change (Retired   now.  Life is on a slower brendan now.). Negative for appetite change, chills, diaphoresis, fatigue, fever, malaise/fatigue and unexpected weight change (gained 5 lbs).   HENT:  Negative for congestion, hearing loss, rhinorrhea, sneezing and trouble swallowing.         Sinus problems.   Eyes:  Negative for pain, discharge, redness and visual disturbance.   Respiratory:  Negative for cough, chest tightness, shortness of breath and wheezing.    Cardiovascular:  Negative for chest pain, palpitations and leg swelling.        Borderline hypertension.   Gastrointestinal:  Negative for abdominal distention, abdominal pain, blood in stool, constipation, diarrhea and vomiting.   Endocrine: Negative for cold intolerance, heat intolerance, polydipsia, polyphagia and polyuria.        Diabetes mellitus.  Showing worsening with recent hemoglobin A1c of > 8.0   Genitourinary:  Negative for difficulty urinating, dysuria, frequency, hematuria and urgency.        No significant prostate symptoms   Musculoskeletal:  Positive for arthralgias (hand pains). Negative for back pain, gait problem, joint swelling and neck pain.        Patient is status post right-sided hip arthroplasty and is recovering gradually with acceptable range of motion and quality of life.   Skin:  Positive for rash (psoriasis). Negative for color change, pallor and wound.        Extensive psoriasis on the skin especially in the trunk and back.  Also has spots of vitiligo on the back and also on the hands..   Neurological:  Negative for dizziness, seizures, speech difficulty, weakness, numbness and headaches.        Last year he had complained of tingling and numbness in the left hand and he is doing okay now.  Probably it was due to prolonged .   Psychiatric/Behavioral:  Negative for agitation, behavioral problems, confusion and dysphoric mood. The patient is not nervous/anxious.         Euthymic mostly.       Objective:      Blood pressure  "130/77, pulse 98, height 5' 8" (1.727 m), weight 88 kg (194 lb). Body mass index is 29.5 kg/m².  Physical Exam  Constitutional:       General: He is not in acute distress.     Appearance: He is well-developed. He is not ill-appearing, toxic-appearing or diaphoretic.      Comments: BMI is 29.50   HENT:      Head: Normocephalic and atraumatic.      Mouth/Throat:      Comments: Patient is wearing a facial mask.  COVID-19  Eyes:      General: No scleral icterus.        Right eye: No discharge.         Left eye: No discharge.   Neck:      Thyroid: No thyromegaly.      Vascular: No JVD.      Trachea: No tracheal deviation.   Cardiovascular:      Rate and Rhythm: Normal rate and regular rhythm.      Heart sounds: Normal heart sounds. No murmur heard.  Pulmonary:      Effort: No respiratory distress.      Breath sounds: Normal breath sounds. No stridor.   Abdominal:      General: There is no distension.      Palpations: Abdomen is soft.      Tenderness: There is no abdominal tenderness. There is no rebound.   Musculoskeletal:         General: No tenderness or deformity.      Cervical back: No tenderness.      Right lower leg: No edema.      Left lower leg: No edema.        Feet:    Feet:      Right foot:      Protective Sensation: 5 sites tested.  5 sites sensed.      Toenail Condition: Right toenails are long.      Left foot:      Protective Sensation: 5 sites tested.  5 sites sensed.      Toenail Condition: Left toenails are long.      Comments:   Slightly dystrophic nails on the left side.    Somewhat shiny skin on the toes.  Lymphadenopathy:      Cervical: No cervical adenopathy.   Skin:     General: Skin is dry.      Findings: Rash (psoriasis) present.      Comments: Extensive psoriasis noted on the skin in the trunk and back.  Vitiligo on the upper shoulder and upper back.  This runs in the family including mother and perhaps 1 of the sisters.  Also patient's son has it.   Neurological:      Mental Status: He is " alert. Mental status is at baseline.   Psychiatric:         Behavior: Behavior normal.         Assessment:               Type 2 diabetes mellitus without complication, without long-term current use of insulin  Comments:    Plan is to increase injection Trulicity to 4.5 mg Q weekly.  Goal is to get his hemoglobin A1c consistently less than 7.5.  Continue metformin  Orders:  -     dulaglutide (TRULICITY) 4.5 mg/0.5 mL pen injector; Inject 4.5 mg into the skin every 7 days.  Dispense: 4 pen ; Refill: 11  -     Hemoglobin A1C; Future; Expected date: 10/23/2023  -     Microalbumin/Creatinine Ratio, Urine; Future; Expected date: 10/23/2023    Mixed dyslipidemia  Comments:   continue atorvastatin 40 mg.    Essential hypertension  Comments:    Currently on amlodipine 10 mg.  We have not given him Ace or ARB because of elevated creatinine and several episodes of elevated potassium.    Hypercalcemia  Comments:    Was taking a lot of Tums.  Cut down on the times.  Tums were cheap at [employer].    Psoriasis    Abnormal serum creatinine level  Comments:    One creatinine level was elevated.  Need to check labs again.  Need to get records from Dr. Aguayo.  Orders:  -     Renal Function Panel; Future; Expected date: 10/23/2023       No visits with results within 3 Month(s) from this visit.   Latest known visit with results is:   Office Visit on 04/12/2023   Component Date Value Ref Range Status    Hemoglobin A1c 07/19/2023 8.9 (H)  4.8 - 5.6 % Final    PTH, Intact 04/12/2023 21  15 - 65 pg/mL Final    Ionized Calcium 04/12/2023 5.2  4.5 - 5.6 mg/dL Final    Vit D, 25-Hydroxy 04/12/2023 43.7  30.0 - 100.0 ng/mL Final    Albumin 04/12/2023 4.7  3.8 - 4.8 g/dL Final    Hemoglobin A1c 04/12/2023 6.9 (H)  4.8 - 5.6 % Final     Component Ref Range & Units 3 d ago  (7/19/23) 3 mo ago  (4/12/23) 9 mo ago  (10/17/22) 1 yr ago  (5/20/22) 1 yr ago  (2/19/22) 1 yr ago  (10/1/21) 2 yr ago  (2/27/21)   Hemoglobin A1c 4.8 - 5.6 % 8.9 High    6.9 High  CM  7.2 High  CM  6.8 High  CM  8.1 High  CM  6.3 High  CM  6.9 High        Plan:           Type 2 diabetes mellitus without complication, without long-term current use of insulin  Comments:    Plan is to increase injection Trulicity to 4.5 mg Q weekly.  Goal is to get his hemoglobin A1c consistently less than 7.5.  Continue metformin  Orders:  -     dulaglutide (TRULICITY) 4.5 mg/0.5 mL pen injector; Inject 4.5 mg into the skin every 7 days.  Dispense: 4 pen ; Refill: 11  -     Hemoglobin A1C; Future; Expected date: 10/23/2023  -     Microalbumin/Creatinine Ratio, Urine; Future; Expected date: 10/23/2023    Mixed dyslipidemia  Comments:   continue atorvastatin 40 mg.    Essential hypertension  Comments:    Currently on amlodipine 10 mg.  We have not given him Ace or ARB because of elevated creatinine and several episodes of elevated potassium.    Hypercalcemia  Comments:    Was taking a lot of Tums.  Cut down on the times.  Tums were cheap at [employer].    Psoriasis    Abnormal serum creatinine level  Comments:    One creatinine level was elevated.  Need to check labs again.  Need to get records from Dr. Aguayo.  Orders:  -     Renal Function Panel; Future; Expected date: 10/23/2023      Patient's blood sugars are elevated and his hemoglobin A1c is 8.9.  Before I add another class of medication like SGLT 2, I will give injection Trulicity the full chance at 4.5 mg.    He does have 3 more pens remaining for the injection Trulicity 3 mg and I would like him to finished using that.      He has seen Dr. David Aguayo MD Nephrology for labs and he had been advised to stop the numerous amount of times that he was taking for his heartburn.  That might have been contributed to his hypercalcemia in past.      He is taking probably 1 of those PPIs.  His wife will clarify to assess exactly as to what he is taking.      Foot examination has been done today.    Slight this condition discrepancy.      Foot arch  is good.  No ulceration or preulcerative callus.      She will be due for ophthalmology examination.      He is following up with Dermatology for his psoriasis also.      Weight is stable.      Continue with COVID precautions.      Consider shingles vaccine down the road.      He did get 3 COVID vaccination thus far.      Current Outpatient Medications:     amLODIPine (NORVASC) 10 MG tablet, TAKE ONE TABLET BY MOUTH EVERY DAY, Disp: 90 tablet, Rfl: 1    aspirin 81 MG Chew, 1 tablet once daily., Disp: , Rfl:     atorvastatin (LIPITOR) 40 MG tablet, TAKE ONE TABLET BY MOUTH EVERY MORNING, Disp: 90 tablet, Rfl: 3    cetirizine 10 mg Cap, Take 1 tablet by mouth once daily., Disp: , Rfl:     metFORMIN (GLUCOPHAGE) 500 MG tablet, Take 2 tablets (1,000 mg total) by mouth 2 (two) times daily., Disp: 360 tablet, Rfl: 3    turmeric root extract 500 mg Cap, Take 1 tablet by mouth 2 (two) times daily., Disp: , Rfl:     vit C/E/Zn/coppr/lutein/zeaxan (PRESERVISION AREDS-2 ORAL), , Disp: , Rfl:     dulaglutide (TRULICITY) 4.5 mg/0.5 mL pen injector, Inject 4.5 mg into the skin every 7 days., Disp: 4 pen , Rfl: 11

## 2023-07-27 ENCOUNTER — OFFICE VISIT (OUTPATIENT)
Dept: FAMILY MEDICINE | Facility: CLINIC | Age: 68
End: 2023-07-27
Payer: MEDICARE

## 2023-07-27 VITALS
DIASTOLIC BLOOD PRESSURE: 77 MMHG | SYSTOLIC BLOOD PRESSURE: 130 MMHG | HEIGHT: 68 IN | HEART RATE: 98 BPM | WEIGHT: 194 LBS | BODY MASS INDEX: 29.4 KG/M2

## 2023-07-27 DIAGNOSIS — E83.52 HYPERCALCEMIA: ICD-10-CM

## 2023-07-27 DIAGNOSIS — E11.9 TYPE 2 DIABETES MELLITUS WITHOUT COMPLICATION, WITHOUT LONG-TERM CURRENT USE OF INSULIN: Primary | Chronic | ICD-10-CM

## 2023-07-27 DIAGNOSIS — R79.89 ABNORMAL SERUM CREATININE LEVEL: ICD-10-CM

## 2023-07-27 DIAGNOSIS — E78.2 MIXED DYSLIPIDEMIA: Chronic | ICD-10-CM

## 2023-07-27 DIAGNOSIS — L40.9 PSORIASIS: ICD-10-CM

## 2023-07-27 DIAGNOSIS — I10 ESSENTIAL HYPERTENSION: ICD-10-CM

## 2023-07-27 PROCEDURE — 1160F RVW MEDS BY RX/DR IN RCRD: CPT | Mod: CPTII,S$GLB,, | Performed by: INTERNAL MEDICINE

## 2023-07-27 PROCEDURE — 1126F AMNT PAIN NOTED NONE PRSNT: CPT | Mod: CPTII,S$GLB,, | Performed by: INTERNAL MEDICINE

## 2023-07-27 PROCEDURE — 3052F HG A1C>EQUAL 8.0%<EQUAL 9.0%: CPT | Mod: CPTII,S$GLB,, | Performed by: INTERNAL MEDICINE

## 2023-07-27 PROCEDURE — 1160F PR REVIEW ALL MEDS BY PRESCRIBER/CLIN PHARMACIST DOCUMENTED: ICD-10-PCS | Mod: CPTII,S$GLB,, | Performed by: INTERNAL MEDICINE

## 2023-07-27 PROCEDURE — 3008F PR BODY MASS INDEX (BMI) DOCUMENTED: ICD-10-PCS | Mod: CPTII,S$GLB,, | Performed by: INTERNAL MEDICINE

## 2023-07-27 PROCEDURE — 3288F PR FALLS RISK ASSESSMENT DOCUMENTED: ICD-10-PCS | Mod: CPTII,S$GLB,, | Performed by: INTERNAL MEDICINE

## 2023-07-27 PROCEDURE — 3008F BODY MASS INDEX DOCD: CPT | Mod: CPTII,S$GLB,, | Performed by: INTERNAL MEDICINE

## 2023-07-27 PROCEDURE — 3078F DIAST BP <80 MM HG: CPT | Mod: CPTII,S$GLB,, | Performed by: INTERNAL MEDICINE

## 2023-07-27 PROCEDURE — 99214 OFFICE O/P EST MOD 30 MIN: CPT | Mod: S$GLB,,, | Performed by: INTERNAL MEDICINE

## 2023-07-27 PROCEDURE — 1126F PR PAIN SEVERITY QUANTIFIED, NO PAIN PRESENT: ICD-10-PCS | Mod: CPTII,S$GLB,, | Performed by: INTERNAL MEDICINE

## 2023-07-27 PROCEDURE — 1159F MED LIST DOCD IN RCRD: CPT | Mod: CPTII,S$GLB,, | Performed by: INTERNAL MEDICINE

## 2023-07-27 PROCEDURE — 3075F PR MOST RECENT SYSTOLIC BLOOD PRESS GE 130-139MM HG: ICD-10-PCS | Mod: CPTII,S$GLB,, | Performed by: INTERNAL MEDICINE

## 2023-07-27 PROCEDURE — 3075F SYST BP GE 130 - 139MM HG: CPT | Mod: CPTII,S$GLB,, | Performed by: INTERNAL MEDICINE

## 2023-07-27 PROCEDURE — 99214 PR OFFICE/OUTPT VISIT, EST, LEVL IV, 30-39 MIN: ICD-10-PCS | Mod: S$GLB,,, | Performed by: INTERNAL MEDICINE

## 2023-07-27 PROCEDURE — 3052F PR MOST RECENT HEMOGLOBIN A1C LEVEL 8.0 - < 9.0%: ICD-10-PCS | Mod: CPTII,S$GLB,, | Performed by: INTERNAL MEDICINE

## 2023-07-27 PROCEDURE — 1159F PR MEDICATION LIST DOCUMENTED IN MEDICAL RECORD: ICD-10-PCS | Mod: CPTII,S$GLB,, | Performed by: INTERNAL MEDICINE

## 2023-07-27 PROCEDURE — 3288F FALL RISK ASSESSMENT DOCD: CPT | Mod: CPTII,S$GLB,, | Performed by: INTERNAL MEDICINE

## 2023-07-27 PROCEDURE — 1101F PR PT FALLS ASSESS DOC 0-1 FALLS W/OUT INJ PAST YR: ICD-10-PCS | Mod: CPTII,S$GLB,, | Performed by: INTERNAL MEDICINE

## 2023-07-27 PROCEDURE — 1101F PT FALLS ASSESS-DOCD LE1/YR: CPT | Mod: CPTII,S$GLB,, | Performed by: INTERNAL MEDICINE

## 2023-07-27 PROCEDURE — 3078F PR MOST RECENT DIASTOLIC BLOOD PRESSURE < 80 MM HG: ICD-10-PCS | Mod: CPTII,S$GLB,, | Performed by: INTERNAL MEDICINE

## 2023-07-27 RX ORDER — DULAGLUTIDE 4.5 MG/.5ML
4.5 INJECTION, SOLUTION SUBCUTANEOUS
Qty: 4 PEN | Refills: 11 | Status: SHIPPED | OUTPATIENT
Start: 2023-07-27 | End: 2024-07-26

## 2023-08-31 LAB
LEFT EYE DM RETINOPATHY: NEGATIVE
RIGHT EYE DM RETINOPATHY: NEGATIVE

## 2023-09-07 ENCOUNTER — PATIENT OUTREACH (OUTPATIENT)
Dept: ADMINISTRATIVE | Facility: HOSPITAL | Age: 68
End: 2023-09-07
Payer: MEDICARE

## 2023-09-07 NOTE — LETTER
AUTHORIZATION FOR RELEASE OF   CONFIDENTIAL INFORMATION    Dear Dr. Raphael Gusman,    We are seeing Mg Torre, date of birth 1955, in the clinic at [address] [facility]. Salazar Campbell MD is the patient's PCP. Mg Torre has an outstanding lab/procedure at the time we reviewed his chart. In order to help keep his health information updated, he has authorized us to request the following medical record(s):        ( x )  DILATED EYE EXAM              Please fax records to [facility], Raina, Sanjay, MD, [phone]     If you have any questions, please contact       Milka Gauthier  Nurse Clinical Care Coordinator  [facility]  Phone: [phone]  Fax: [phone]    Patient Name: Mg Torre  : 1955  Patient Phone #: [phone]            Mg Torre  MRN: [id number]  : 1955  Age: 67 y.o.  Sex: male       MEDICARE-PATIENTS CERTIFICATION, AUTHORIZATION TO RELEASE INFORMATION AND PAYMENT REQUEST:  I certify that the information given by me in applying under the Title XVII of Social Security Act is correct.  I authorize any huitron of medical or other information about me to release to the Social Security Administration or its intermediaries or carriers any information needed for this or a related Medicare claim.  I request that payment of authorized benefits be made on my behalf to [facility] hospital and [facility] ([facility]).  I also acknowledge upon admission, that I received the Important Message from Medicare.     AUTHORIZATION TO PAY INSURANCE BENEFITS:  For and in consideration of medical services rendered to the patient named herein, I hereby assign and transfer to [facility], including but not limited to hospital based physicians, attending physicians, consulting physicians, nurse practitioners and physicians assistants the rights for the payment of medical  benefits which I may have under the policy/policies identified by me during registration or any policy which may be determined hereafter to pay benefits otherwise payable to me or to a beneficiary designated in the policy.  By this assignment, I authorize payment directly to [facility], hospital based physicians, attending physicians and consulting physicians of all medical benefits payable under the aforesaid policy/policies, but not to exceed the hospitals and/or clinic regular charges.     GUARANTEE OF ACCOUNT:  I/We certify that the information given is true and correct to the best of my/our knowledge.  I/We understand that bills are payable within thirty (30) days of the date of service.  If it becomes necessary for the account to be referred to an  or collection agency, the undersigned agrees to pay the reasonable s fees or collection expenses. I/We emilia permission and consent to [facility], our assignees, and third party collection agents to contact myself/us by any telephone number associated with myself/us, including wireless numbers and to leave answering machine and voicemail messages and include in any such messages, information required by law (including debt collection laws) and/or messages regarding amounts owed; to send text messages or emails using any email addresses I/we provided; to use pre-recorded/artificial voice messages  and/or an automatic dialing device in connection with any communications.   I/We agree to be responsible for the payment of all charges of this medical service and hospital based physicians, attending physicians and consulting physicians services rendered to the above named patient     COMMUNICATION AUTHORIZATION:   I hereby authorize Ana Guzman, to contact me on my cell phone and/or home phone using prerecorded messages, artificial voice messages, automatic telephone dialing devices or other computer assisted technology, or by electronic  mail, text messaging, or by any other form of electronic communication. This includes, but is not limited to, appointment reminders, yearly physical exam reminders, preventive care reminders, patient campaigns and welcome calls. I understand I have the right to opt out of these communications at any time.        Page 1 of 3                 CONSENT AND ACKNOWLEDGEMENT FORM CONTINUED     AUTHORIZATION TO RELEASE INFORMATION:  I hereby authorize [facility] and hospital based physicians to release the information for this occasion of service requested by my insurance company or third party payor for the purpose of obtaining payment for services rendered during this admission and/or to other healthcare providers for the purpose of follow-up care or evaluation of care.  This information may or may not include mental health and/or substance abuse information.     AUTHORIZATION FOR MEDICAL AND/OR SURGICAL TREATMENT:  I hereby authorize [facility] and its employees or agents to provide hospital care incident to this admission, including without limitations, consent to routine diagnostic procedures and medical treatment, which is to include whatever procedures that are deemed necessary by the admitting doctor and such other physicians or assistants as he may designate.     PERSONAL VALUABLES:      It is understood and agreed that the [facility] maintains a safe for the safekeeping of money and valuables and the hospital shall not be liable for the loss of damage to any money, jewelry, glasses, documents, dentures, hearing aids or other articles of unusual value, unless placed therein, and shall not be liable for loss or damage to any other personal property, unless deposited with the hospital for safekeeping.  VALUABLES ARE NOT TO BE LEFT IN THE PATIENTS ROOM.     ADVANCE DIRECTIVES:  I understand that I am not required to have Advance Directives in order to be treated.  I have received written information about my  rights to formulate Advance Directives.     NOTICE OF PRIVACY PRACTICES/PATIENT RIGHTS/ADMISSION PACKET:  I acknowledge that I have received copies of the [facility] Notice of Privacy Practices, Patient Rights, and the Admission packet, which contains Smoking Cessation information. I understand that weapons, illegal drugs, or any other items considered  contraband, are not allowed on the [facility] campus, and that I do not have such items in my possession.        CONSENT TO PHOTOGRAPH AND/OR VIDEO TAPE DOCUMENTATION OF CARE:  I understand that photographs, videotapes, digital, or other images may be recorded to document my care.  I acknowledge that [facility] will retain the ownership rights to these photographs, videotapes, digital, or other images, and that I will be allowed access to view or obtain copies of any photographs, videotapes, digital, or other images created as part of the documentation of my care.  I understand that these images will be stored in a secure manner that will protect my privacy and that they will be kept for the time period required by law or by policy at [facility]. Images that identify me will be released and/or used outside the institution only upon written authorization from me or my legal representative (LEONIDAS, 2001).                                                                                                                                Page 2 of 3                    CONSENT AND ACKNOWLEDGEMENT FORM CONTINUED     [place] IMM[employer] NETWORK (LINKS) PARTICIPATION:  I acknowledge that I have been informed about [place] [employer], or LINKS.  I understand that it is a means to keep track of my immunization records for myself, doctors offices, hospitals and other health care providers through secure, electronic means.     INSURANCE NETWORK ACKNOWLEDGEMENT:                                                                            I acknowledge that I have  received notice, based on the information available at this time, regarding the status of my insurance plan as in or out of network at [facility]. I understand that a full listing of accepted insurance plans can be found at the [facility] website.     NOTICE     HEALTH CARE SERVICES MAY BE PROVIDED TO YOU AT A NETWORK HEALTH CARE FACILITY BY FACILITY-BASED PHYSICIANS WHO ARE NOT IN YOUR HEALTH PLAN.  YOU MAY BE RESPONSIBLE FOR PAYMENT OF ALL OR PART OF THE FEES FOR THOSE OUT-OF-NETWORK SERVICES, IN ADDITION TO APPLICABLE AMOUNTS DUE FOR CO-PAYMENTS, COINSURANCE, DEDUCTIBLES, AND NON-COVERED SERVICES.  SPECIFIC INFORMATION ABOUT IN-NETWORK AND OUT-OF NETWORK FACILITY-BASED PHYSICIANS CAN BE FOUND AT THE WEBSITE ADDRESS OF YOUR HEALTH PLAN OR BY CALLING THE [employer] SERVICE TELEPHONE NUMBER OF YOUR HEALTH PLAN.        I/WE HAVE READ, UNDERSTAND AND AGREE TO THE ABOVE.        _______________________________   Patient/Legal Guardian Signature     This signature was collected at 06/23/2022     chuyita     Self  _______________________________   Printed Name/Relationship to Patient       Witness  Sign Here  _______________________________   Witness Signature     This signature was collected at 06/23/2022     np   _______________________________   Printed Name         Page 3 of 3

## 2023-09-07 NOTE — PROGRESS NOTES
Population Health Chart Review & Patient Outreach Details:     Reason for Outreach Encounter:     [x]  Non-Compliant Report   []  Payor Report (Hum[employer], PHN, BCBS, MSSP, MCIP, UHC, etc.)   []  Pre-Visit Chart Review     Updates Requested / Reviewed:     [x]  Care Everywhere    [x]     [x]  External Sources ([facility], [facility], DIS, etc.)   []  Care Team Updated    Patient Outreach Method:    [x]  Telephone Outreach Completed   [x] Successful   [] Left Voicemail   [] Unable to Contact (wrong number, no voicemail)  []  [facility] Outreach Sent  []  Letter Outreach Mailed  [x]  Fax Sent for External Records  []  External Records Upload    Health Maintenance Topics Addressed and Outreach Outcomes / Actions Taken:        []      Breast Cancer Screening []  Mammo Scheduled      []  External Records Requested     []  Added Reminder to Complete to Upcoming Primary Care Appt Notes     []  Patient Declined     []  Patient Will Call Back to Schedule     []  Patient Will Schedule with External Provider / Order Routed if Applicable             []       Cervical Cancer Screening []  Pap Scheduled      []  External Records Requested     []  Added Reminder to Complete to Upcoming Primary Care Appt Notes     []  Patient Declined     []  Patient Will Call Back to Schedule     []  Patient Will Schedule with External Provider               []          Colorectal Cancer Screening []  Colonoscopy Case Request or Referral Placed     []  External Records Requested     []  Added Reminder to Complete to Upcoming Primary Care Appt Notes     []  Patient Declined     []  Patient Will Call Back to Schedule     []  Patient Will Schedule with External Provider     []  Fit Kit Mailed (add the SmartPhrase under additional notes)     []  Reminded Patient to Complete Home Test             [x]      Diabetic Eye Exam []  Eye Camera Scheduled or Optometry Referral Placed     [x]  External Records Requested     []  Added Reminder to Complete  to Upcoming Primary Care Appt Notes     []  Patient Declined     []  Patient Will Call Back to Schedule     []  Patient Will Schedule with External Provider             []      Blood Pressure Control []  Primary Care Follow Up Visit Scheduled     []  Remote Blood Pressure Reading Captured     []  Added Reminder to Complete to Upcoming Primary Care Appt Notes     []  Patient Declined     []  Patient Will Call Back / Patient Will Send Portal Message with Reading     []  Patient Will Call Back to Schedule Provider Visit             [x]       HbA1c & Other Labs [x] Labs were completed on 8/3/23     []      []  Reminded Patient to Complete Home Test     []  Added Reminder to Complete to Upcoming Primary Care Appt Notes     []  Patient Declined     []  Patient Will Call Back to Schedule     []  Patient Will Schedule with External Provider / Order Routed if Applicable           []    Schedule Primary Care Appt []  Primary Care Appt Scheduled     []  Patient Declined     []  Patient Will Call Back to Schedule     []  Pt Established with External Provider & Updated Care Team             []      Medication Adherence []  Primary Care Appointment Scheduled     []  Added Reminder to Upcoming Primary Care Appt Notes     []  Patient Reminded to  Prescription     []  Patient Declined, Provider Notified if Needed     []  Sent Provider Message to Review and/or Add Exclusion to Problem List             []      Osteoporosis Screening []  DXA Appointment Scheduled     []  External Records Requested     []  Added Reminder to Complete to Upcoming Primary Care Appt Notes     []  Patient Declined     []  Patient Will Call Back to Schedule     []  Patient Will Schedule with External Provider / Order Routed if Applicable     Additional Care Coordinator Notes:         Further Action Needed If Patient Returns Outreach:

## 2023-09-11 ENCOUNTER — PATIENT OUTREACH (OUTPATIENT)
Dept: ADMINISTRATIVE | Facility: HOSPITAL | Age: 68
End: 2023-09-11
Payer: MEDICARE

## 2023-09-11 PROBLEM — Z13.5 SCREENING FOR DIABETIC RETINOPATHY: Status: ACTIVE | Noted: 2023-08-31

## 2023-09-11 NOTE — PROGRESS NOTES
Population Health Chart Review & Patient Outreach Details:     Reason for Outreach Encounter:     [x]  Non-Compliant Report   []  Payor Report (Hum[employer], PHN, BCBS, MSSP, MCIP, UHC, etc.)   []  Pre-Visit Chart Review     Updates Requested / Reviewed:     []  Care Everywhere    []     []  External Sources ([facility], [facility], DIS, etc.)   []  Care Team Updated    Patient Outreach Method:    []  Telephone Outreach Completed   [] Successful   [] Left Voicemail   [] Unable to Contact (wrong number, no voicemail)  []  [facility] Outreach Sent  []  Letter Outreach Mailed  []  Fax Sent for External Records  [x]  External Records Upload    Health Maintenance Topics Addressed and Outreach Outcomes / Actions Taken:        []      Breast Cancer Screening []  Mammo Scheduled      []  External Records Requested     []  Added Reminder to Complete to Upcoming Primary Care Appt Notes     []  Patient Declined     []  Patient Will Call Back to Schedule     []  Patient Will Schedule with External Provider / Order Routed if Applicable             []       Cervical Cancer Screening []  Pap Scheduled      []  External Records Requested     []  Added Reminder to Complete to Upcoming Primary Care Appt Notes     []  Patient Declined     []  Patient Will Call Back to Schedule     []  Patient Will Schedule with External Provider               [x]          Colorectal Cancer Screening []  Colonoscopy Case Request or Referral Placed     []  External Records Requested     []  Added Reminder to Complete to Upcoming Primary Care Appt Notes     []  Patient Declined     []  Patient Will Call Back to Schedule     []  Patient Will Schedule with External Provider     []  Fit Kit Mailed (add the SmartPhrase under additional notes)     []  Reminded Patient to Complete Home Test             []      Diabetic Eye Exam []  Eye Camera Scheduled or Optometry Referral Placed     []  External Records Requested     []  Added Reminder to Complete to  Upcoming Primary Care Appt Notes     []  Patient Declined     []  Patient Will Call Back to Schedule     []  Patient Will Schedule with External Provider             []      Blood Pressure Control []  Primary Care Follow Up Visit Scheduled     []  Remote Blood Pressure Reading Captured     []  Added Reminder to Complete to Upcoming Primary Care Appt Notes     []  Patient Declined     []  Patient Will Call Back / Patient Will Send Portal Message with Reading     []  Patient Will Call Back to Schedule Provider Visit             []       HbA1c & Other Labs []  Lab Appt Scheduled for Due Labs     []  Primary Care Follow Up Visit Scheduled      []  Reminded Patient to Complete Home Test     []  Added Reminder to Complete to Upcoming Primary Care Appt Notes     []  Patient Declined     []  Patient Will Call Back to Schedule     []  Patient Will Schedule with External Provider / Order Routed if Applicable           []    Schedule Primary Care Appt []  Primary Care Appt Scheduled     []  Patient Declined     []  Patient Will Call Back to Schedule     []  Pt Established with External Provider & Updated Care Team             []      Medication Adherence []  Primary Care Appointment Scheduled     []  Added Reminder to Upcoming Primary Care Appt Notes     []  Patient Reminded to  Prescription     []  Patient Declined, Provider Notified if Needed     []  Sent Provider Message to Review and/or Add Exclusion to Problem List             []      Osteoporosis Screening []  DXA Appointment Scheduled     []  External Records Requested     []  Added Reminder to Complete to Upcoming Primary Care Appt Notes     []  Patient Declined     []  Patient Will Call Back to Schedule     []  Patient Will Schedule with External Provider / Order Routed if Applicable     Additional Care Coordinator Notes:         Further Action Needed If Patient Returns Outreach:

## 2023-09-21 ENCOUNTER — OFFICE VISIT (OUTPATIENT)
Dept: DERMATOLOGY | Facility: CLINIC | Age: 68
End: 2023-09-21
Payer: MEDICARE

## 2023-09-21 VITALS — BODY MASS INDEX: 29.4 KG/M2 | WEIGHT: 194 LBS | HEIGHT: 68 IN

## 2023-09-21 DIAGNOSIS — Z11.1 SCREENING-PULMONARY TB: ICD-10-CM

## 2023-09-21 DIAGNOSIS — L40.0 PLAQUE PSORIASIS: Primary | ICD-10-CM

## 2023-09-21 DIAGNOSIS — L57.0 ACTINIC KERATOSES: ICD-10-CM

## 2023-09-21 PROCEDURE — 17000 PR DESTRUCTION(LASER SURGERY,CRYOSURGERY,CHEMOSURGERY),PREMALIGNANT LESIONS,FIRST LESION: ICD-10-PCS | Mod: S$GLB,,, | Performed by: DERMATOLOGY

## 2023-09-21 PROCEDURE — 3052F PR MOST RECENT HEMOGLOBIN A1C LEVEL 8.0 - < 9.0%: ICD-10-PCS | Mod: CPTII,S$GLB,, | Performed by: DERMATOLOGY

## 2023-09-21 PROCEDURE — 3008F PR BODY MASS INDEX (BMI) DOCUMENTED: ICD-10-PCS | Mod: CPTII,S$GLB,, | Performed by: DERMATOLOGY

## 2023-09-21 PROCEDURE — 1159F PR MEDICATION LIST DOCUMENTED IN MEDICAL RECORD: ICD-10-PCS | Mod: CPTII,S$GLB,, | Performed by: DERMATOLOGY

## 2023-09-21 PROCEDURE — 99204 PR OFFICE/OUTPT VISIT, NEW, LEVL IV, 45-59 MIN: ICD-10-PCS | Mod: 25,S$GLB,, | Performed by: DERMATOLOGY

## 2023-09-21 PROCEDURE — 3052F HG A1C>EQUAL 8.0%<EQUAL 9.0%: CPT | Mod: CPTII,S$GLB,, | Performed by: DERMATOLOGY

## 2023-09-21 PROCEDURE — 1101F PR PT FALLS ASSESS DOC 0-1 FALLS W/OUT INJ PAST YR: ICD-10-PCS | Mod: CPTII,S$GLB,, | Performed by: DERMATOLOGY

## 2023-09-21 PROCEDURE — 3008F BODY MASS INDEX DOCD: CPT | Mod: CPTII,S$GLB,, | Performed by: DERMATOLOGY

## 2023-09-21 PROCEDURE — 1101F PT FALLS ASSESS-DOCD LE1/YR: CPT | Mod: CPTII,S$GLB,, | Performed by: DERMATOLOGY

## 2023-09-21 PROCEDURE — 99204 OFFICE O/P NEW MOD 45 MIN: CPT | Mod: 25,S$GLB,, | Performed by: DERMATOLOGY

## 2023-09-21 PROCEDURE — 1160F RVW MEDS BY RX/DR IN RCRD: CPT | Mod: CPTII,S$GLB,, | Performed by: DERMATOLOGY

## 2023-09-21 PROCEDURE — 1160F PR REVIEW ALL MEDS BY PRESCRIBER/CLIN PHARMACIST DOCUMENTED: ICD-10-PCS | Mod: CPTII,S$GLB,, | Performed by: DERMATOLOGY

## 2023-09-21 PROCEDURE — 3288F PR FALLS RISK ASSESSMENT DOCUMENTED: ICD-10-PCS | Mod: CPTII,S$GLB,, | Performed by: DERMATOLOGY

## 2023-09-21 PROCEDURE — 17000 DESTRUCT PREMALG LESION: CPT | Mod: S$GLB,,, | Performed by: DERMATOLOGY

## 2023-09-21 PROCEDURE — 3288F FALL RISK ASSESSMENT DOCD: CPT | Mod: CPTII,S$GLB,, | Performed by: DERMATOLOGY

## 2023-09-21 PROCEDURE — 1159F MED LIST DOCD IN RCRD: CPT | Mod: CPTII,S$GLB,, | Performed by: DERMATOLOGY

## 2023-09-21 RX ORDER — GUSELKUMAB 100 MG/ML
INJECTION SUBCUTANEOUS
Qty: 2 ML | Refills: 0 | Status: SHIPPED | OUTPATIENT
Start: 2023-09-21 | End: 2023-09-27 | Stop reason: SDUPTHER

## 2023-09-21 RX ORDER — DAPAGLIFLOZIN 10 MG/1
10 TABLET, FILM COATED ORAL EVERY MORNING
COMMUNITY
Start: 2023-09-06

## 2023-09-21 RX ORDER — GUSELKUMAB 100 MG/ML
INJECTION SUBCUTANEOUS
Qty: 1 ML | Refills: 3 | Status: ACTIVE | OUTPATIENT
Start: 2023-09-21 | End: 2024-03-14

## 2023-09-21 RX ORDER — PANTOPRAZOLE SODIUM 40 MG/1
TABLET, DELAYED RELEASE ORAL
COMMUNITY
Start: 2023-08-31

## 2023-09-21 RX ORDER — TRIAMCINOLONE ACETONIDE 1 MG/G
CREAM TOPICAL
Qty: 454 G | Refills: 3 | Status: SHIPPED | OUTPATIENT
Start: 2023-09-21

## 2023-09-21 NOTE — PATIENT INSTRUCTIONS

## 2023-09-21 NOTE — PROGRESS NOTES
Subjective:      Patient ID:  Mg Torre is a 68 y.o. male who presents for   Chief Complaint   Patient presents with    Psoriasis     New Patient    Patient here for PSORIASIS  DIAGNOSED AGE 18  Never treated with anything  Very itchy when exposed to heat  Pain in hands - past high speed drill use, attributes to this  Aches at night, denies AM stiffness  PEST questionnaire; 3+  Have you ever had a swollen joint or joints? no  Has your doctor ever told you you had arthritis? no  Do your finger nails or toe nails have holes or pits? Not noticed  Have you had pain in your heel or tiffany's tendon? no  Have had a finger or toe that was completely swollen and painful for no apparent reason? no     Derm Hx:  No Phx NMSC  No Fhx MM     Current Outpatient Medications:   ·  amLODIPine (NORVASC) 10 MG tablet, TAKE ONE TABLET BY MOUTH EVERY DAY, Disp: 90 tablet, Rfl: 1  ·  aspirin 81 MG Chew, 1 tablet once daily., Disp: , Rfl:   ·  atorvastatin (LIPITOR) 40 MG tablet, TAKE ONE TABLET BY MOUTH EVERY MORNING, Disp: 90 tablet, Rfl: 3  ·  cetirizine 10 mg Cap, Take 1 tablet by mouth once daily., Disp: , Rfl:   ·  dulaglutide (TRULICITY) 4.5 mg/0.5 mL pen injector, Inject 4.5 mg into the skin every 7 days., Disp: 4 pen , Rfl: 11  ·  FARXIGA 10 mg tablet, Take 10 mg by mouth every morning., Disp: , Rfl:   ·  metFORMIN (GLUCOPHAGE) 500 MG tablet, Take 2 tablets (1,000 mg total) by mouth 2 (two) times daily., Disp: 360 tablet, Rfl: 3  ·  pantoprazole (PROTONIX) 40 MG tablet, TAKE 1 TABLET BY MOUTH ONCE DAILY BEFORE BREAKFAST. DO not crush, chew, OR split, Disp: , Rfl:   ·  turmeric root extract 500 mg Cap, Take 1 tablet by mouth 2 (two) times daily., Disp: , Rfl:   ·  vit C/E/Zn/coppr/lutein/zeaxan (PRESERVISION AREDS-2 ORAL), , Disp: , Rfl:         Review of Systems   Constitutional:  Negative for fever, chills and fatigue.   Respiratory:  Negative for cough and shortness of breath.    Skin:  Positive for itching, dry  skin, activity-related sunscreen use and wears hat. Negative for rash and daily sunscreen use.       Objective:   Physical Exam   Constitutional: He appears well-developed and well-nourished. No distress.   Neurological: He is alert and oriented to person, place, and time. He is not disoriented.   Psychiatric: He has a normal mood and affect.   Skin:   Areas Examined (abnormalities noted in diagram):   Scalp / Hair Palpated and Inspected  Head / Face Inspection Performed  Neck Inspection Performed  Chest / Axilla Inspection Performed  Abdomen Inspection Performed  Genitals / Buttocks / Groin Inspection Performed  Back Inspection Performed  RUE Inspected  LUE Inspection Performed  RLE Inspected  LLE Inspection Performed  Nails and Digits Inspection Performed                         Diagram Legend     Erythematous scaling macule/papule c/w actinic keratosis       Vascular papule c/w angioma      Pigmented verrucoid papule/plaque c/w seborrheic keratosis      Yellow umbilicated papule c/w sebaceous hyperplasia      Irregularly shaped tan macule c/w lentigo     1-2 mm smooth white papules consistent with Milia      Movable subcutaneous cyst with punctum c/w epidermal inclusion cyst      Subcutaneous movable cyst c/w pilar cyst      Firm pink to brown papule c/w dermatofibroma      Pedunculated fleshy papule(s) c/w skin tag(s)      Evenly pigmented macule c/w junctional nevus     Mildly variegated pigmented, slightly irregular-bordered macule c/w mildly atypical nevus      Flesh colored to evenly pigmented papule c/w intradermal nevus       Pink pearly papule/plaque c/w basal cell carcinoma      Erythematous hyperkeratotic cursted plaque c/w SCC      Surgical scar with no sign of skin cancer recurrence      Open and closed comedones      Inflammatory papules and pustules      Verrucoid papule consistent consistent with wart     Erythematous eczematous patches and plaques     Dystrophic onycholytic nail with subungual  debris c/w onychomycosis     Umbilicated papule    Erythematous-base heme-crusted tan verrucoid plaque consistent with inflamed seborrheic keratosis     Erythematous Silvery Scaling Plaque c/w Psoriasis     See annotation                                        Assessment / Plan:        Actinic keratosis, left ear  Cryosurgery Procedure Note    Verbal consent from the patient is obtained and the patient is aware of the precancerous quality and need for treatment of these lesions. Liquid nitrogen cryosurgery is applied to the 1 actinic keratoses, as detailed in the physical exam, to produce a freeze injury. The patient is aware that blisters may form and is instructed on wound care with gentle cleansing and use of vaseline ointment to keep moist until healed. The patient is supplied a handout on cryosurgery and is instructed to call if lesions do not completely resolve.    Plaque psoriasis  -     Ambulatory referral/consult to Dermatology  -     guselkumab (TREMFYA) 100 mg/mL Syrg; Inject 100 mg into the skin x 1 on week 0, week 4, then every 8 weeks.  Dispense: 2 mL; Refill: 0  -     guselkumab (TREMFYA) 100 mg/mL AtIn; Inject one ml subcutaneously every 8 weeks  Dispense: 1 mL; Refill: 3  -     Hepatitis B Core Antibody, Total; Future; Expected date: 09/21/2023  -     Hepatitis B Surface Ab, Qualitative; Future; Expected date: 09/21/2023  -     Hepatitis B Surface Antigen; Future; Expected date: 09/21/2023  -     Hepatitis C Antibody; Future; Expected date: 09/21/2023  -     Quantiferon Gold TB; Future; Expected date: 09/21/2023  -     CBC Auto Differential; Future; Expected date: 09/21/2023  -     Comprehensive Metabolic Panel; Future; Expected date: 09/21/2023  -     triamcinolone acetonide 0.1% (KENALOG) 0.1 % cream; AAA bid  Dispense: 454 g; Refill: 3    Screening-pulmonary TB    BSA OVER 25%, PGA 4  Face, genitalia, scalp, nails  Affects sleep and QOL  Unlikely to improve to satisfaction with topicals  Patient  with DM2,. At risk for CVD, psoriasis confers one additional risk factor and warrants systemic treatment  Too severe for MTX           Follow up in about 3 months (around 12/21/2023).

## 2023-09-26 ENCOUNTER — CLINICAL SUPPORT (OUTPATIENT)
Dept: DERMATOLOGY | Facility: CLINIC | Age: 68
End: 2023-09-26
Payer: MEDICARE

## 2023-09-26 DIAGNOSIS — L40.0 PLAQUE PSORIASIS: Primary | ICD-10-CM

## 2023-09-26 NOTE — PROGRESS NOTES
Pt here for injection education and first injection of Tremfya. Pt stated that he has been giving himself trulicity injections so is familiar with injections.  Injection safety and technique and infection control  discussed with verbal feedback. After verifying the order, pt administered Tremfya 100 mg/ml  (clinic provided) into his left thigh without any complications. Pt provided with sample of Tremfya 100 mg/ml to take home for his 4 week injection while waiting for his Rx to be approved.  Pt observed for 15 minutes post injection.   NDC # 60051-357-82  Lot # [id number]  Exp- 9/2024

## 2023-10-10 ENCOUNTER — TELEPHONE (OUTPATIENT)
Dept: DERMATOLOGY | Facility: CLINIC | Age: 68
End: 2023-10-10
Payer: MEDICARE

## 2023-10-10 NOTE — TELEPHONE ENCOUNTER
Message from [facility]:  Martha Gyu, PharmD  Mora Taylor, RN  Looks like patient ended up going to patient assistance, the completed application was sent and now we are just waiting for the program to approve or deny the application for Tremfya.

## 2023-10-16 ENCOUNTER — TELEPHONE (OUTPATIENT)
Dept: DERMATOLOGY | Facility: CLINIC | Age: 68
End: 2023-10-16
Payer: MEDICARE

## 2023-10-16 NOTE — TELEPHONE ENCOUNTER
Martha Guy, Mora Conway, RN  We did receive the Tremfya w/ Me forms from your office but he needs the [facility] Patient Assistance one signed. We called patient on 10/13/23 as we sent him the patient assistance forms and he said he hasn't had a chance to look at them. We will continue to follow with patient until he signs them.

## 2023-11-02 ENCOUNTER — TELEPHONE (OUTPATIENT)
Dept: DERMATOLOGY | Facility: CLINIC | Age: 68
End: 2023-11-02
Payer: MEDICARE

## 2023-11-02 NOTE — TELEPHONE ENCOUNTER
Martha Guy, Mora Conway, RN  We received the doctor's portion but we've contacted patient on 10/13, 10/20, 10/27, 10/30, and 10/31 and he still hasn't sent us his portion back. We have a follow up pended now on 11/7/23 to call him again.           Previous Messages       ----- Message -----   From: Mora Taylor, ANGELES   Sent: 11/2/2023   8:20 AM CDT   To: Martha Guy PharmD     Any update on whether he completed the PAP or not ?

## 2023-11-03 LAB
ALBUMIN SERPL-MCNC: 4.2 G/DL (ref 3.9–4.9)
ALBUMIN/CREAT UR: <6 MG/G CREAT (ref 0–29)
BUN SERPL-MCNC: 21 MG/DL (ref 8–27)
BUN/CREAT SERPL: 17 (ref 10–24)
CALCIUM SERPL-MCNC: 9.2 MG/DL (ref 8.6–10.2)
CHLORIDE SERPL-SCNC: 98 MMOL/L (ref 96–106)
CO2 SERPL-SCNC: 25 MMOL/L (ref 20–29)
CREAT SERPL-MCNC: 1.25 MG/DL (ref 0.76–1.27)
CREAT UR-MCNC: 52 MG/DL
EST. GFR  (NO RACE VARIABLE): 63 ML/MIN/1.73
GLUCOSE SERPL-MCNC: 191 MG/DL (ref 70–99)
HBA1C MFR BLD: 8.1 % (ref 4.8–5.6)
MICROALBUMIN UR-MCNC: <3 UG/ML
PHOSPHATE SERPL-MCNC: 4.1 MG/DL (ref 2.8–4.1)
POTASSIUM SERPL-SCNC: 4.4 MMOL/L (ref 3.5–5.2)
SODIUM SERPL-SCNC: 134 MMOL/L (ref 134–144)

## 2023-11-12 NOTE — PROGRESS NOTES
Subjective:       Patient ID: Mg Torre is a 68 y.o. male.    Chief Complaint: Diabetes, Follow-up, Hypertension, Hyperlipidemia, and Gastroesophageal Reflux    Mr. Mg Torre is a 68-year-old gentleman who comes for 3 months follow-up.      Medical issues are as below:-    1. Type 2 diabetes mellitus currently on metformin, injection Trulicity which was increased to 4.5 mg previously 2.-dyslipidemia  3.-hypertension  4.-longstanding underlying psoriasis  5.-history of COVID-19.  6.-gastroesophageal reflux on pantoprazole  7.-recent prescription noted for Tremfya (guselkumab)-had seen Dr. Matheus Mcneil MD dermatology and also screened for potential infections including hepatitis-B panel and hepatitis-C.    Pictures taken by Dr. Mcneil indicates extensive psoriatic plaques.  Also cautery of a suspected lesion was performed    He has found significant relief with injection Tremfya.  The cost is another issue and probably there might be another alternative treatment at this point.  Most of his lesions have dissipated or minimized.              Control of diabetes has been fluctuant and patchy with some good phases in past and some equally bad phases in past.  Recent A1c level has shows a rise again indicating poor control    During his elier.  It was difficult to be aggressive for fear of hypoglycemia.    Currently he is on Trulicity at 3 mg per week.  He is also on metformin.  Previously he was on glipizide which was discontinued secondary to crossing age above 65 and further risks for this population.    He continues with atorvastatin cholesterol, amlodipine for blood pressure.  He is not on Ace or ARB.  Previous intolerance to losartan has been noted.  Hyperkalemia and elevated creatinine were noted.    He has also gone through phases of changes in insurance is and limited ability to afford brand name medications.      Psoriasis:-this has been going on for several decades and is probably  hereditary.  Thus far he has been avoiding any treatment.  Intermittently he itches which can be socially embarrassing.    Diabetes  He presents for his follow-up diabetic visit. He has type 2 diabetes mellitus. No MedicAlert identification noted. The initial diagnosis of diabetes was made 20 years ago. His disease course has been worsening (Recent hemoglobin A1c 6.8.). Pertinent negatives for hypoglycemia include no confusion, dizziness, headaches, nervousness/anxiousness, pallor, seizures or speech difficulty. Pertinent negatives for diabetes include no chest pain, no fatigue, no polydipsia, no polyphagia, no polyuria and no weakness. There are no hypoglycemic complications. Symptoms are worsening. Pertinent negatives for diabetic complications include no autonomic neuropathy, CVA or PVD. Risk factors for coronary artery disease include dyslipidemia, diabetes mellitus, male sex, hypertension and sedentary lifestyle. Current diabetic treatment includes oral agent (dual therapy) (Recently changed insurance and he had a gap between feeling for Trulicity..). He is compliant with treatment some of the time. His weight is increasing steadily (191-197 lbs). Meal planning includes avoidance of concentrated sweets. He has not had a previous visit with a dietitian. His home blood glucose trend is increasing steadily. His breakfast blood glucose range is generally 140-180 mg/dl. An ACE inhibitor/angiotensin II receptor blocker is contraindicated (Intolerant to Ace and ARB-hyperkalemia). He does not see a podiatrist.Eye exam is not current.   Hypertension  This is a chronic problem. The current episode started more than 1 year ago. The problem is unchanged. The problem is controlled. Pertinent negatives include no chest pain, headaches, malaise/fatigue, neck pain, palpitations or shortness of breath. There are no associated agents to hypertension. Risk factors for coronary artery disease include male gender, dyslipidemia and  diabetes mellitus. Past treatments include calcium channel blockers and diuretics. The current treatment provides moderate improvement. Compliance problems include psychosocial issues.  There is no history of angina, CVA, heart failure or PVD. There is no history of chronic renal disease, coarctation of the aorta, hyperaldosteronism, hypercortisolism, pheochromocytoma, renovascular disease or sleep apnea.   Hyperlipidemia  This is a chronic problem. The current episode started more than 1 year ago. He has no history of chronic renal disease, diabetes, hypothyroidism, liver disease or obesity. Pertinent negatives include no chest pain or shortness of breath. Current antihyperlipidemic treatment includes statins. The current treatment provides moderate improvement of lipids. Compliance problems include psychosocial issues.  Risk factors for coronary artery disease include hypertension, male sex, dyslipidemia, a sedentary lifestyle and diabetes mellitus.   Other  This is a recurrent (Hyperkalemia chronic and now hypercalcemia) problem. The current episode started more than 1 year ago. The problem has been gradually worsening. Associated symptoms include arthralgias (hand pains) and a rash (psoriasis). Pertinent negatives include no abdominal pain, chest pain, chills, congestion, coughing, diaphoresis, fatigue, fever, headaches, joint swelling, neck pain, numbness, vomiting or weakness.       Past Medical History:   Diagnosis Date    Allergy     pcn    Diabetes mellitus     Diabetes mellitus, type 2     Hyperlipidemia     Hypertension     Lab test positive for detection of COVID-19 virus 08/10/2021    Patient had tested himself at urgent care in [facility].  Minimally symptomatic at this point.  Continue with precautions.  Patient's son is positive with significant problems.    Screening for diabetic retinopathy 08/31/2023    Negative     Social History     Socioeconomic History    Marital status:       Spouse name: Sharyn Nicho    Number of children: 2   Occupational History    Occupation:    Tobacco Use    Smoking status: Never    Smokeless tobacco: Never   Substance and Sexual Activity    Alcohol use: Yes    Drug use: No    Sexual activity: Yes     Partners: Female   Social History Narrative    Together 2 children- raised 6 total with Ms Coles     Social Determinants of Health     Financial Resource Strain: Medium Risk (6/23/2022)    Overall Financial Resource Strain (CARDIA)     Difficulty of Paying Living Expenses: Somewhat hard   Food Insecurity: No Food Insecurity (6/23/2022)    Hunger Vital Sign     Worried About Running Out of Food in the Last Year: Never true     Ran Out of Food in the Last Year: Never true   Transportation Needs: No Transportation Needs (6/23/2022)    PRAPARE - Transportation     Lack of Transportation (Medical): No     Lack of Transportation (Non-Medical): No   Physical Activity: Inactive (6/23/2022)    Exercise Vital Sign     Days of Exercise per Week: 0 days     Minutes of Exercise per Session: 0 min   Stress: No Stress Concern Present (6/23/2022)    [employer] - Occupational Stress Questionnaire     Feeling of Stress : Only a little   Social Connections: Moderately Integrated (6/23/2022)    Social Connection and Isolation Panel [NHANES]     Frequency of Communication with Friends and Family: Three times a week     Frequency of Social Gatherings with Friends and Family: Three times a week     Attends Zoroastrianism Services: 1 to 4 times per year     Active Member of Clubs or Organizations: No     Attends Club or Organization Meetings: Never     Marital Status:    Housing Stability: Low Risk  (6/23/2022)    Housing Stability Vital Sign     Unable to Pay for Housing in the Last Year: No     Number of Places Lived in the Last Year: 1     Unstable Housing in the Last Year: No     Past Surgical History:   Procedure Laterality Date     APPENDECTOMY      SPINE SURGERY       Family History   Problem Relation Age of Onset    Heart disease Father     Obesity Son        Review of Systems   Constitutional:  Positive for activity change (Retired  now.  Life is on a slower brendan now.). Negative for appetite change, chills, diaphoresis, fatigue, fever, malaise/fatigue and unexpected weight change (gained 5 lbs).   HENT:  Negative for congestion, hearing loss, rhinorrhea, sneezing and trouble swallowing.         Sinus problems.   Eyes:  Negative for pain, discharge, redness and visual disturbance.   Respiratory:  Negative for cough, chest tightness, shortness of breath and wheezing.    Cardiovascular:  Negative for chest pain, palpitations and leg swelling.        Borderline hypertension.   Gastrointestinal:  Negative for abdominal distention, abdominal pain, blood in stool, constipation, diarrhea and vomiting.   Endocrine: Negative for cold intolerance, heat intolerance, polydipsia, polyphagia and polyuria.        Diabetes mellitus.  Showing worsening with recent hemoglobin A1c of > 8.0   Genitourinary:  Negative for difficulty urinating, dysuria, frequency, hematuria and urgency.        No significant prostate symptoms   Musculoskeletal:  Positive for arthralgias (hand pains). Negative for back pain, gait problem, joint swelling and neck pain.        Patient is status post right-sided hip arthroplasty and is recovering gradually with acceptable range of motion and quality of life.   Skin:  Positive for rash (psoriasis). Negative for color change, pallor and wound.        Extensive psoriasis on the skin especially in the trunk and back.  Also has spots of vitiligo on the back and also on the hands..   Neurological:  Negative for dizziness, seizures, speech difficulty, weakness, numbness and headaches.        Last year he had complained of tingling and numbness in the left hand and he is doing okay now.  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   Psychiatric/Behavioral:  Negative for agitation, behavioral problems, confusion and dysphoric mood. The patient is not nervous/anxious.         Euthymic mostly.         Objective:      Blood pressure 120/74, pulse 90, height 5' 8" (1.727 m), weight 84.8 kg (187 lb). Body mass index is 28.43 kg/m².  Physical Exam  Constitutional:       General: He is not in acute distress.     Appearance: He is well-developed. He is not ill-appearing, toxic-appearing or diaphoretic.      Comments: BMI is 28.43   HENT:      Head: Normocephalic and atraumatic.   Eyes:      General: No scleral icterus.  Neck:      Thyroid: No thyromegaly.      Vascular: No JVD.      Trachea: No tracheal deviation.   Cardiovascular:      Rate and Rhythm: Normal rate and regular rhythm.      Heart sounds: Normal heart sounds. No murmur heard.  Pulmonary:      Effort: No respiratory distress.      Breath sounds: Normal breath sounds. No stridor.   Abdominal:      General: There is no distension.      Palpations: Abdomen is soft.      Tenderness: There is no abdominal tenderness. There is no rebound.   Musculoskeletal:         General: No tenderness or deformity.      Cervical back: No tenderness.      Right lower leg: No edema.      Left lower leg: No edema.        Feet:    Feet:      Right foot:      Protective Sensation: 5 sites tested.  5 sites sensed.      Toenail Condition: Right toenails are long.      Left foot:      Protective Sensation: 5 sites tested.  5 sites sensed.      Toenail Condition: Left toenails are long.      Comments:   Slightly dystrophic nails on the left side.    Somewhat shiny skin on the toes.  Lymphadenopathy:      Cervical: No cervical adenopathy.   Skin:     General: Skin is dry.      Findings: Rash (psoriasis.  Seems to be getting better and fading away.) present.      Comments: Extensive psoriasis noted on the skin in the trunk and back.  Vitiligo on the upper shoulder and upper back.  This runs in the family " including mother and perhaps 1 of the sisters.  Also patient's son has it.   Neurological:      Mental Status: He is alert. Mental status is at baseline.   Psychiatric:         Behavior: Behavior normal.           Assessment:             Type 2 diabetes mellitus without complication, without long-term current use of insulin  Comments:  Stable blood sugars though not optimal.  Consider Inj. Mounjaro next time.  Orders:  -     Lipid Panel; Future; Expected date: 02/05/2024  -     Hemoglobin A1C; Future; Expected date: 02/05/2024  -     Comprehensive Metabolic Panel; Future; Expected date: 02/05/2024    Mixed dyslipidemia  Comments:  Currently on atorvastatin.    Essential hypertension  Comments:  Currently on amlodipine.  Ace and ARB ease caused hyperkalemia    Psoriasis  Comments:  Seems to be getting better with Tremfya.  Other options to be explored because of cost issues.    Abnormal serum creatinine level  Comments:  Borderline creatinine level.  Currently on Farxiga.  Consider Kerendia in future. cost maybe a consideration    Need for vaccination  -     Influenza - Quadrivalent - High Dose (65+) (PF) (IM)       Potassium 3.5 - 5.2 mmol/L 4.4 4.3 R  5.8 High  5.3 High  5.3 High  5.7 High      omponent Ref Range & Units 12 d ago  (11/2/23) 3 mo ago  (7/19/23) 3 mo ago  (7/19/23) 7 mo ago  (4/12/23) 1 yr ago  (10/17/22) 1 yr ago  (5/20/22) 1 yr ago  (2/19/22)   Hemoglobin A1c 4.8 - 5.6 % 8.1 High  8.9 High  CM 8.9 Abnormal  R 6.9 High  CM 7.2 High  CM 6.8 High  CM 8.1 High      Lab Visit on 09/21/2023   Component Date Value Ref Range Status    Hep B Core Total Ab 09/21/2023 Non-reactive  Non-reactive Final    Hep B S Ab 09/21/2023 <3.00  mIU/mL Final    Hep B S Ab 09/21/2023 Non-reactive   Final    Hepatitis B Surface Ag 09/21/2023 Non-reactive  Non-reactive Final    Hepatitis C Ab 09/21/2023 Non-reactive  Non-reactive Final    NIL 09/21/2023 0.15931  IU/mL Final    TB1 - Nil 09/21/2023 0.136  IU/mL Final    TB2 -  Nil 09/21/2023 0.240  IU/mL Final    Mitogen - Nil 09/21/2023 9.991  IU/mL Final    TB Gold Plus 09/21/2023 Negative  Negative Final    WBC 09/21/2023 6.77  3.90 - 12.70 K/uL Final    RBC 09/21/2023 5.74  4.60 - 6.20 M/uL Final    Hemoglobin 09/21/2023 15.4  14.0 - 18.0 g/dL Final    Hematocrit 09/21/2023 49.1  40.0 - 54.0 % Final    MCV 09/21/2023 86  82 - 98 fL Final    MCH 09/21/2023 26.8 (L)  27.0 - 31.0 pg Final    MCHC 09/21/2023 31.4 (L)  32.0 - 36.0 g/dL Final    RDW 09/21/2023 13.5  11.5 - 14.5 % Final    Platelets 09/21/2023 317  150 - 450 K/uL Final    MPV 09/21/2023 11.5  9.2 - 12.9 fL Final    Immature Granulocytes 09/21/2023 0.4  0.0 - 0.5 % Final    Gran # (ANC) 09/21/2023 5.2  1.8 - 7.7 K/uL Final    Immature Grans (Abs) 09/21/2023 0.03  0.00 - 0.04 K/uL Final    Lymph # 09/21/2023 0.9 (L)  1.0 - 4.8 K/uL Final    Mono # 09/21/2023 0.6  0.3 - 1.0 K/uL Final    Eos # 09/21/2023 0.0  0.0 - 0.5 K/uL Final    Baso # 09/21/2023 0.00  0.00 - 0.20 K/uL Final    nRBC 09/21/2023 0  0 /100 WBC Final    Gran % 09/21/2023 76.7 (H)  38.0 - 73.0 % Final    Lymph % 09/21/2023 13.6 (L)  18.0 - 48.0 % Final    Mono % 09/21/2023 9.3  4.0 - 15.0 % Final    Eosinophil % 09/21/2023 0.0  0.0 - 8.0 % Final    Basophil % 09/21/2023 0.0  0.0 - 1.9 % Final    Differential Method 09/21/2023 Automated   Final    Sodium 09/21/2023 137  136 - 145 mmol/L Final    Potassium 09/21/2023 4.3  3.5 - 5.1 mmol/L Final    Chloride 09/21/2023 101  95 - 110 mmol/L Final    CO2 09/21/2023 25  23 - 29 mmol/L Final    Glucose 09/21/2023 140 (H)  70 - 110 mg/dL Final    BUN 09/21/2023 17  8 - 23 mg/dL Final    Creatinine 09/21/2023 1.3  0.5 - 1.4 mg/dL Final    Calcium 09/21/2023 10.3  8.7 - 10.5 mg/dL Final    Total Protein 09/21/2023 7.9  6.0 - 8.4 g/dL Final    Albumin 09/21/2023 4.5  3.5 - 5.2 g/dL Final    Total Bilirubin 09/21/2023 0.9  0.1 - 1.0 mg/dL Final    Alkaline Phosphatase 09/21/2023 77  55 - 135 U/L Final    AST 09/21/2023 22   10 - 40 U/L Final    ALT 09/21/2023 19  10 - 44 U/L Final    eGFR 09/21/2023 59.8 (A)  >60 mL/min/1.73 m^2 Final    Anion Gap 09/21/2023 11  8 - 16 mmol/L Final   Patient Outreach on 09/11/2023   Component Date Value Ref Range Status    Left Eye DM Retinopathy 08/31/2023 Negative   Final    Right Eye DM Retinopathy 08/31/2023 Negative   Final   Patient Outreach on 09/07/2023   Component Date Value Ref Range Status    Hemoglobin A1C 07/19/2023 8.9 (A)  8.9 % Final         Plan:           Type 2 diabetes mellitus without complication, without long-term current use of insulin  Comments:  Stable blood sugars though not optimal.  Consider Inj. Mounjaro next time.  Orders:  -     Lipid Panel; Future; Expected date: 02/05/2024  -     Hemoglobin A1C; Future; Expected date: 02/05/2024  -     Comprehensive Metabolic Panel; Future; Expected date: 02/05/2024    Mixed dyslipidemia  Comments:  Currently on atorvastatin.    Essential hypertension  Comments:  Currently on amlodipine.  Ace and ARB ease caused hyperkalemia    Psoriasis  Comments:  Seems to be getting better with Tremfya.  Other options to be explored because of cost issues.    Abnormal serum creatinine level  Comments:  Borderline creatinine level.  Currently on Farxiga.  Consider Kerendia in future. cost maybe a consideration    Need for vaccination  -     Influenza - Quadrivalent - High Dose (65+) (PF) (IM)      Overall asif seems to be doing okay.      The injection Tremfya worked extremely well on him and his psoriasis lesions have almost dissipated.  He looks like a happy new man .    He is on maximum dose of injection Trulicity at 4.5 mg and he is also on metformin.  His potassium level is doing okay.      He is also on Farxiga for kidney protection.  Next time will think about injection Inj. Mounjaro for better improvement of his diabetes.      Please continue walking and exercise.    He continues on atorvastatin.      He is updated on most of the  vaccinations.  He has been recommended RSV vaccine.      Consider COVID precautions.    Following preventive care measures have been discussed:-  1.-influenza vaccine-updated  2.-pneumonia vaccine-completed  3.-COVID precautions and vaccination-received 3 vaccines  4.-RSV vaccine which is available at this point-discussed  5.-shingles vaccine-completed  6.-colonoscopy or colorectal-last procedure in 2019    Follow-up in 3-4 months time.      Spent julee 30 minutes with patient which involved review of pts medical conditions, labs, medications and with 50% of time face-to-face discussion about medical problems, management and any applicable changes.          Current Outpatient Medications:     amLODIPine (NORVASC) 10 MG tablet, TAKE ONE TABLET BY MOUTH EVERY DAY, Disp: 90 tablet, Rfl: 1    aspirin 81 MG Chew, 1 tablet once daily., Disp: , Rfl:     atorvastatin (LIPITOR) 40 MG tablet, TAKE ONE TABLET BY MOUTH EVERY MORNING, Disp: 90 tablet, Rfl: 3    cetirizine 10 mg Cap, Take 1 tablet by mouth once daily., Disp: , Rfl:     dulaglutide (TRULICITY) 4.5 mg/0.5 mL pen injector, Inject 4.5 mg into the skin every 7 days., Disp: 4 pen , Rfl: 11    FARXIGA 10 mg tablet, Take 10 mg by mouth every morning., Disp: , Rfl:     guselkumab (TREMFYA) 100 mg/mL AtIn, Inject one ml subcutaneously every 8 weeks, Disp: 1 mL, Rfl: 3    metFORMIN (GLUCOPHAGE) 500 MG tablet, Take 2 tablets (1,000 mg total) by mouth 2 (two) times daily., Disp: 360 tablet, Rfl: 3    pantoprazole (PROTONIX) 40 MG tablet, TAKE 1 TABLET BY MOUTH ONCE DAILY BEFORE BREAKFAST. DO not crush, chew, OR split, Disp: , Rfl:     triamcinolone acetonide 0.1% (KENALOG) 0.1 % cream, AAA bid, Disp: 454 g, Rfl: 3    turmeric root extract 500 mg Cap, Take 1 tablet by mouth 2 (two) times daily., Disp: , Rfl:     vit C/E/Zn/coppr/lutein/zeaxan (PRESERVISION AREDS-2 ORAL), , Disp: , Rfl:     guselkumab 100 mg/mL AtIn, Inject 100 mg into the skin week 0 and week 4 (Patient not  taking: Reported on 11/14/2023), Disp: 2 mL, Rfl: 0

## 2023-11-13 ENCOUNTER — PATIENT OUTREACH (OUTPATIENT)
Dept: ADMINISTRATIVE | Facility: HOSPITAL | Age: 68
End: 2023-11-13
Payer: MEDICARE

## 2023-11-13 NOTE — PROGRESS NOTES
Population Health Chart Review & Patient Outreach Details    Outreach Performed: NO    Additional Pop Health Notes:           Updates Requested / Reviewed:      Updated Care Coordination Note, Care Everywhere, , and Immunizations Reconciliation Completed or Queried: [place]         Health Maintenance Topics Overdue:    Health Maintenance Due   Topic Date Due    RSV Vaccine (Age 60+) (1 - 1-dose 60+ series) Never done    Influenza Vaccine (1) 09/01/2023    COVID-19 Vaccine (4 - 2023-24 season) 09/01/2023    Lipid Panel  10/17/2023         Health Maintenance Topic(s) Outreach Outcomes & Actions Taken:    Lab(s) - Outreach Outcomes & Actions Taken  : Overdue Lab(s) Scheduled

## 2023-11-14 ENCOUNTER — OFFICE VISIT (OUTPATIENT)
Dept: FAMILY MEDICINE | Facility: CLINIC | Age: 68
End: 2023-11-14
Payer: MEDICARE

## 2023-11-14 VITALS
BODY MASS INDEX: 28.34 KG/M2 | WEIGHT: 187 LBS | DIASTOLIC BLOOD PRESSURE: 74 MMHG | HEIGHT: 68 IN | SYSTOLIC BLOOD PRESSURE: 120 MMHG | HEART RATE: 90 BPM

## 2023-11-14 DIAGNOSIS — R79.89 ABNORMAL SERUM CREATININE LEVEL: ICD-10-CM

## 2023-11-14 DIAGNOSIS — L40.9 PSORIASIS: Chronic | ICD-10-CM

## 2023-11-14 DIAGNOSIS — E11.9 TYPE 2 DIABETES MELLITUS WITHOUT COMPLICATION, WITHOUT LONG-TERM CURRENT USE OF INSULIN: Primary | Chronic | ICD-10-CM

## 2023-11-14 DIAGNOSIS — Z23 NEED FOR VACCINATION: ICD-10-CM

## 2023-11-14 DIAGNOSIS — E78.2 MIXED DYSLIPIDEMIA: Chronic | ICD-10-CM

## 2023-11-14 DIAGNOSIS — I10 ESSENTIAL HYPERTENSION: Chronic | ICD-10-CM

## 2023-11-14 PROCEDURE — 3066F PR DOCUMENTATION OF TREATMENT FOR NEPHROPATHY: ICD-10-PCS | Mod: CPTII,S$GLB,, | Performed by: INTERNAL MEDICINE

## 2023-11-14 PROCEDURE — 3074F PR MOST RECENT SYSTOLIC BLOOD PRESSURE < 130 MM HG: ICD-10-PCS | Mod: CPTII,S$GLB,, | Performed by: INTERNAL MEDICINE

## 2023-11-14 PROCEDURE — 1101F PT FALLS ASSESS-DOCD LE1/YR: CPT | Mod: CPTII,S$GLB,, | Performed by: INTERNAL MEDICINE

## 2023-11-14 PROCEDURE — 3288F PR FALLS RISK ASSESSMENT DOCUMENTED: ICD-10-PCS | Mod: CPTII,S$GLB,, | Performed by: INTERNAL MEDICINE

## 2023-11-14 PROCEDURE — 3052F HG A1C>EQUAL 8.0%<EQUAL 9.0%: CPT | Mod: CPTII,S$GLB,, | Performed by: INTERNAL MEDICINE

## 2023-11-14 PROCEDURE — 3288F FALL RISK ASSESSMENT DOCD: CPT | Mod: CPTII,S$GLB,, | Performed by: INTERNAL MEDICINE

## 2023-11-14 PROCEDURE — 3078F DIAST BP <80 MM HG: CPT | Mod: CPTII,S$GLB,, | Performed by: INTERNAL MEDICINE

## 2023-11-14 PROCEDURE — 1159F MED LIST DOCD IN RCRD: CPT | Mod: CPTII,S$GLB,, | Performed by: INTERNAL MEDICINE

## 2023-11-14 PROCEDURE — 1160F PR REVIEW ALL MEDS BY PRESCRIBER/CLIN PHARMACIST DOCUMENTED: ICD-10-PCS | Mod: CPTII,S$GLB,, | Performed by: INTERNAL MEDICINE

## 2023-11-14 PROCEDURE — 3052F PR MOST RECENT HEMOGLOBIN A1C LEVEL 8.0 - < 9.0%: ICD-10-PCS | Mod: CPTII,S$GLB,, | Performed by: INTERNAL MEDICINE

## 2023-11-14 PROCEDURE — 1126F PR PAIN SEVERITY QUANTIFIED, NO PAIN PRESENT: ICD-10-PCS | Mod: CPTII,S$GLB,, | Performed by: INTERNAL MEDICINE

## 2023-11-14 PROCEDURE — 3008F PR BODY MASS INDEX (BMI) DOCUMENTED: ICD-10-PCS | Mod: CPTII,S$GLB,, | Performed by: INTERNAL MEDICINE

## 2023-11-14 PROCEDURE — 3061F NEG MICROALBUMINURIA REV: CPT | Mod: CPTII,S$GLB,, | Performed by: INTERNAL MEDICINE

## 2023-11-14 PROCEDURE — 1126F AMNT PAIN NOTED NONE PRSNT: CPT | Mod: CPTII,S$GLB,, | Performed by: INTERNAL MEDICINE

## 2023-11-14 PROCEDURE — 1160F RVW MEDS BY RX/DR IN RCRD: CPT | Mod: CPTII,S$GLB,, | Performed by: INTERNAL MEDICINE

## 2023-11-14 PROCEDURE — 99214 PR OFFICE/OUTPT VISIT, EST, LEVL IV, 30-39 MIN: ICD-10-PCS | Mod: S$GLB,,, | Performed by: INTERNAL MEDICINE

## 2023-11-14 PROCEDURE — 3061F PR NEG MICROALBUMINURIA RESULT DOCUMENTED/REVIEW: ICD-10-PCS | Mod: CPTII,S$GLB,, | Performed by: INTERNAL MEDICINE

## 2023-11-14 PROCEDURE — 90662 FLU VACCINE - QUADRIVALENT - HIGH DOSE (65+) PRESERVATIVE FREE IM: ICD-10-PCS | Mod: S$GLB,,, | Performed by: INTERNAL MEDICINE

## 2023-11-14 PROCEDURE — 3008F BODY MASS INDEX DOCD: CPT | Mod: CPTII,S$GLB,, | Performed by: INTERNAL MEDICINE

## 2023-11-14 PROCEDURE — 99214 OFFICE O/P EST MOD 30 MIN: CPT | Mod: S$GLB,,, | Performed by: INTERNAL MEDICINE

## 2023-11-14 PROCEDURE — 3066F NEPHROPATHY DOC TX: CPT | Mod: CPTII,S$GLB,, | Performed by: INTERNAL MEDICINE

## 2023-11-14 PROCEDURE — 1159F PR MEDICATION LIST DOCUMENTED IN MEDICAL RECORD: ICD-10-PCS | Mod: CPTII,S$GLB,, | Performed by: INTERNAL MEDICINE

## 2023-11-14 PROCEDURE — 90662 IIV NO PRSV INCREASED AG IM: CPT | Mod: S$GLB,,, | Performed by: INTERNAL MEDICINE

## 2023-11-14 PROCEDURE — 1101F PR PT FALLS ASSESS DOC 0-1 FALLS W/OUT INJ PAST YR: ICD-10-PCS | Mod: CPTII,S$GLB,, | Performed by: INTERNAL MEDICINE

## 2023-11-14 PROCEDURE — 3074F SYST BP LT 130 MM HG: CPT | Mod: CPTII,S$GLB,, | Performed by: INTERNAL MEDICINE

## 2023-11-14 PROCEDURE — 3078F PR MOST RECENT DIASTOLIC BLOOD PRESSURE < 80 MM HG: ICD-10-PCS | Mod: CPTII,S$GLB,, | Performed by: INTERNAL MEDICINE

## 2023-11-14 PROCEDURE — G0008 FLU VACCINE - QUADRIVALENT - HIGH DOSE (65+) PRESERVATIVE FREE IM: ICD-10-PCS | Mod: S$GLB,,, | Performed by: INTERNAL MEDICINE

## 2023-11-14 PROCEDURE — G0008 ADMIN INFLUENZA VIRUS VAC: HCPCS | Mod: S$GLB,,, | Performed by: INTERNAL MEDICINE

## 2023-11-15 ENCOUNTER — TELEPHONE (OUTPATIENT)
Dept: DERMATOLOGY | Facility: CLINIC | Age: 68
End: 2023-11-15
Payer: MEDICARE

## 2023-11-15 DIAGNOSIS — I10 BENIGN ESSENTIAL HYPERTENSION: ICD-10-CM

## 2023-11-15 RX ORDER — AMLODIPINE BESYLATE 10 MG/1
10 TABLET ORAL
Qty: 90 TABLET | Refills: 1 | Status: SHIPPED | OUTPATIENT
Start: 2023-11-15

## 2023-11-15 NOTE — TELEPHONE ENCOUNTER
Writer spoke with the pt's wife regarding whether or not they had completed the PAP for pt to get his tremfya. She stated that they had not been able to get it off the internet and that they spoke with someone at [facility] who is mailing the application to them .

## 2023-11-16 DIAGNOSIS — N18.2 CHRONIC KIDNEY DISEASE (CKD), STAGE II (MILD): Primary | ICD-10-CM

## 2023-11-16 DIAGNOSIS — E11.22 TYPE 2 DIABETES MELLITUS WITH DIABETIC CHRONIC KIDNEY DISEASE: ICD-10-CM

## 2023-11-16 DIAGNOSIS — D63.1 ANEMIA IN CHRONIC KIDNEY DISEASE (CODE): ICD-10-CM

## 2023-11-16 DIAGNOSIS — I10 HTN (HYPERTENSION): ICD-10-CM

## 2023-11-16 DIAGNOSIS — R80.9 PROTEINURIA, UNSPECIFIED: ICD-10-CM

## 2023-11-20 ENCOUNTER — HOSPITAL ENCOUNTER (OUTPATIENT)
Dept: RADIOLOGY | Facility: HOSPITAL | Age: 68
Discharge: HOME | End: 2023-11-20
Payer: MEDICARE

## 2023-11-20 DIAGNOSIS — R80.9 PROTEINURIA, UNSPECIFIED: ICD-10-CM

## 2023-11-20 DIAGNOSIS — I10 HTN (HYPERTENSION): ICD-10-CM

## 2023-11-20 DIAGNOSIS — N18.2 CHRONIC KIDNEY DISEASE (CKD), STAGE II (MILD): ICD-10-CM

## 2023-11-20 DIAGNOSIS — E11.22 TYPE 2 DIABETES MELLITUS WITH DIABETIC CHRONIC KIDNEY DISEASE: ICD-10-CM

## 2023-11-20 DIAGNOSIS — D63.1 ANEMIA IN CHRONIC KIDNEY DISEASE (CODE): ICD-10-CM

## 2023-11-20 PROCEDURE — 76770 US EXAM ABDO BACK WALL COMP: CPT | Mod: TC

## 2023-11-20 PROCEDURE — 76770 US RETROPERITONEAL COMPLETE: ICD-10-PCS | Mod: 26,,, | Performed by: RADIOLOGY

## 2023-11-20 PROCEDURE — 76770 US EXAM ABDO BACK WALL COMP: CPT | Mod: 26,,, | Performed by: RADIOLOGY

## 2023-11-30 ENCOUNTER — TELEPHONE (OUTPATIENT)
Dept: DERMATOLOGY | Facility: CLINIC | Age: 68
End: 2023-11-30
Payer: MEDICARE

## 2023-11-30 NOTE — TELEPHONE ENCOUNTER
Martha Guy, Mora Conway, RN  On 11/22/23 we confirmed with his wife they did receive the application in the mail from us and they are working on it and will mail it back. I do not see that they have completed it/mailed it back just yet.          Previous Messages       ----- Message -----  From: Mora Taylor, RN  Sent: 11/28/2023   8:24 AM CST  To: Martha Guy PharmD    Did you all get the pt's PAP yet?  His wife stated that [facility] was going to mail them the form?

## 2023-12-06 ENCOUNTER — TELEPHONE (OUTPATIENT)
Dept: DERMATOLOGY | Facility: CLINIC | Age: 68
End: 2023-12-06
Payer: MEDICARE

## 2023-12-06 NOTE — TELEPHONE ENCOUNTER
Martha Guy, Mora Conway, RN  No, we've contacted them numerous times since October. I think at one point they lost the application so we resent it in November and last time we called on 12/1/2023 they told us they were still working on it.            Previous Messages       ----- Message -----  From: Mora Taylor, RN  Sent: 12/6/2023   8:48 AM CST  To: Martha Guy PharmD    Did you get his paperwork back yet?

## 2023-12-11 PROBLEM — Z13.5 SCREENING FOR DIABETIC RETINOPATHY: Status: RESOLVED | Noted: 2023-08-31 | Resolved: 2023-12-11

## 2023-12-22 ENCOUNTER — TELEPHONE (OUTPATIENT)
Dept: DERMATOLOGY | Facility: CLINIC | Age: 68
End: 2023-12-22
Payer: MEDICARE

## 2023-12-22 NOTE — TELEPHONE ENCOUNTER
Martha Guy PharmD Scarlett-Martin, Joella, RN  We last had contact with them early December and the wife indicated they were still working on it. We have them paused with outgoing calls since we have tried contacting them multiple times and it is on them to complete the paperwork. If they happen to reach back out and send us the paperwork we can let you know!          Previous Messages       ----- Message -----  From: Mora Taylor, ANGELES  Sent: 12/22/2023  10:02 AM CST  To: Martha Guy PharmD    Any update?

## 2024-01-11 DIAGNOSIS — Z00.00 ENCOUNTER FOR MEDICARE ANNUAL WELLNESS EXAM: ICD-10-CM

## 2024-01-16 ENCOUNTER — TELEPHONE (OUTPATIENT)
Dept: DERMATOLOGY | Facility: CLINIC | Age: 69
End: 2024-01-16
Payer: MEDICARE

## 2024-01-16 NOTE — TELEPHONE ENCOUNTER
Attempted to contact pt regarding the status of his PAP for Tremfya with no answer.  LVM to contact the clinic.

## 2024-02-23 ENCOUNTER — TELEPHONE (OUTPATIENT)
Dept: DERMATOLOGY | Facility: CLINIC | Age: 69
End: 2024-02-23
Payer: MEDICARE

## 2024-02-23 NOTE — TELEPHONE ENCOUNTER
Writer spoke with pt's wife and she stated that she thinks they have all the tax information that they need to complete the PAP for Tremfya and she will get it sent out to [facility].

## 2024-03-07 DIAGNOSIS — E78.2 MULTIPLE-TYPE HYPERLIPIDEMIA: ICD-10-CM

## 2024-03-07 RX ORDER — ATORVASTATIN CALCIUM 40 MG/1
40 TABLET, FILM COATED ORAL EVERY MORNING
Qty: 90 TABLET | Refills: 3 | Status: SHIPPED | OUTPATIENT
Start: 2024-03-07

## 2024-03-09 LAB
ALBUMIN SERPL-MCNC: 4.7 G/DL (ref 3.9–4.9)
ALBUMIN/GLOB SERPL: 2 {RATIO} (ref 1.2–2.2)
ALP SERPL-CCNC: 75 IU/L (ref 44–121)
ALT SERPL-CCNC: 19 IU/L (ref 0–44)
AST SERPL-CCNC: 19 IU/L (ref 0–40)
BILIRUB SERPL-MCNC: 0.6 MG/DL (ref 0–1.2)
BUN SERPL-MCNC: 16 MG/DL (ref 8–27)
BUN/CREAT SERPL: 17 (ref 10–24)
CALCIUM SERPL-MCNC: 10.1 MG/DL (ref 8.6–10.2)
CHLORIDE SERPL-SCNC: 101 MMOL/L (ref 96–106)
CHOLEST SERPL-MCNC: 132 MG/DL (ref 100–199)
CO2 SERPL-SCNC: 25 MMOL/L (ref 20–29)
CREAT SERPL-MCNC: 0.95 MG/DL (ref 0.76–1.27)
EST. GFR  (NO RACE VARIABLE): 87 ML/MIN/1.73
GLOBULIN SER CALC-MCNC: 2.4 G/DL (ref 1.5–4.5)
GLUCOSE SERPL-MCNC: 136 MG/DL (ref 70–99)
HBA1C MFR BLD: 7.4 % (ref 4.8–5.6)
HDLC SERPL-MCNC: 39 MG/DL
LDLC SERPL CALC-MCNC: 75 MG/DL (ref 0–99)
POTASSIUM SERPL-SCNC: 5.6 MMOL/L (ref 3.5–5.2)
PROT SERPL-MCNC: 7.1 G/DL (ref 6–8.5)
SODIUM SERPL-SCNC: 142 MMOL/L (ref 134–144)
TRIGL SERPL-MCNC: 98 MG/DL (ref 0–149)
VLDLC SERPL CALC-MCNC: 18 MG/DL (ref 5–40)

## 2024-03-14 ENCOUNTER — OFFICE VISIT (OUTPATIENT)
Dept: FAMILY MEDICINE | Facility: CLINIC | Age: 69
End: 2024-03-14
Payer: MEDICARE

## 2024-03-14 VITALS
WEIGHT: 184 LBS | SYSTOLIC BLOOD PRESSURE: 121 MMHG | DIASTOLIC BLOOD PRESSURE: 76 MMHG | HEART RATE: 97 BPM | HEIGHT: 68 IN | BODY MASS INDEX: 27.89 KG/M2

## 2024-03-14 DIAGNOSIS — E78.2 MIXED DYSLIPIDEMIA: ICD-10-CM

## 2024-03-14 DIAGNOSIS — L40.9 PSORIASIS: Chronic | ICD-10-CM

## 2024-03-14 DIAGNOSIS — E87.5 HYPERKALEMIA: Chronic | ICD-10-CM

## 2024-03-14 DIAGNOSIS — T46.5X5D ADVERSE EFFECT OF ANGIOTENSIN 2 RECEPTOR ANTAGONIST, SUBSEQUENT ENCOUNTER: ICD-10-CM

## 2024-03-14 DIAGNOSIS — I10 ESSENTIAL HYPERTENSION: Chronic | ICD-10-CM

## 2024-03-14 DIAGNOSIS — E83.52 HYPERCALCEMIA: ICD-10-CM

## 2024-03-14 DIAGNOSIS — E11.9 TYPE 2 DIABETES MELLITUS WITHOUT COMPLICATION, WITHOUT LONG-TERM CURRENT USE OF INSULIN: Primary | Chronic | ICD-10-CM

## 2024-03-14 PROBLEM — T46.5X5A ADVERSE REACTION TO ANGIOTENSIN 2 RECEPTOR ANTAGONIST: Status: ACTIVE | Noted: 2024-03-14

## 2024-03-14 PROCEDURE — 99214 OFFICE O/P EST MOD 30 MIN: CPT | Mod: S$GLB,,, | Performed by: INTERNAL MEDICINE

## 2024-03-14 PROCEDURE — 3008F BODY MASS INDEX DOCD: CPT | Mod: CPTII,S$GLB,, | Performed by: INTERNAL MEDICINE

## 2024-03-14 PROCEDURE — 3051F HG A1C>EQUAL 7.0%<8.0%: CPT | Mod: CPTII,S$GLB,, | Performed by: INTERNAL MEDICINE

## 2024-03-14 PROCEDURE — 99999 PR PBB SHADOW E&M-EST. PATIENT-LVL III: CPT | Mod: PBBFAC,,, | Performed by: INTERNAL MEDICINE

## 2024-03-14 PROCEDURE — 1101F PT FALLS ASSESS-DOCD LE1/YR: CPT | Mod: CPTII,S$GLB,, | Performed by: INTERNAL MEDICINE

## 2024-03-14 PROCEDURE — 3074F SYST BP LT 130 MM HG: CPT | Mod: CPTII,S$GLB,, | Performed by: INTERNAL MEDICINE

## 2024-03-14 PROCEDURE — 1126F AMNT PAIN NOTED NONE PRSNT: CPT | Mod: CPTII,S$GLB,, | Performed by: INTERNAL MEDICINE

## 2024-03-14 PROCEDURE — 3078F DIAST BP <80 MM HG: CPT | Mod: CPTII,S$GLB,, | Performed by: INTERNAL MEDICINE

## 2024-03-14 PROCEDURE — 1160F RVW MEDS BY RX/DR IN RCRD: CPT | Mod: CPTII,S$GLB,, | Performed by: INTERNAL MEDICINE

## 2024-03-14 PROCEDURE — 1159F MED LIST DOCD IN RCRD: CPT | Mod: CPTII,S$GLB,, | Performed by: INTERNAL MEDICINE

## 2024-03-14 PROCEDURE — 3288F FALL RISK ASSESSMENT DOCD: CPT | Mod: CPTII,S$GLB,, | Performed by: INTERNAL MEDICINE

## 2024-03-14 NOTE — PROGRESS NOTES
Subjective:       Patient ID: Mg Torre is a 69 y.o. male.    Chief Complaint: Diabetes, Follow-up, Hypertension, Hyperlipidemia, and Psoriasis    Mr. Mg Torre is a 69-year-old gentleman who comes for 3-4  months follow-up.      Medical issues are as below:-    1. Type 2 diabetes mellitus currently on metformin, injection Trulicity which was increased to 4.5 mg previously 2.-dyslipidemia  3.-hypertension  4.-longstanding underlying psoriasis  5.-history of COVID-19.  6.-gastroesophageal reflux on pantoprazole  7.-recent prescription noted for Tremfya (guselkumab)-had seen Dr. Matheus Mcneil MD dermatology and also screened for potential infections including hepatitis-B panel and hepatitis-C.    Recent labs have shown an elevated potassium.  He has not on any potassium supplements.    Pictures taken by Dr. Mcneil indicates extensive psoriatic plaques.  Also cautery of a suspected lesion was performed    He has found significant relief with injection Tremfya.  The cost is another issue and probably there might be another alternative treatment at this point.  Most of his lesions have dissipated or minimized.              Control of diabetes has been fluctuant and patchy with some good phases in past and some equally bad phases in past.  Recent A1c level has shows a rise again indicating poor control    During his elier.  It was difficult to be aggressive for fear of hypoglycemia.    Currently he is on Trulicity at 3 mg per week.  He is also on metformin.  Previously he was on glipizide which was discontinued secondary to crossing age above 65 and further risks for this population.    He continues with atorvastatin cholesterol, amlodipine for blood pressure.  He is not on Ace or ARB.  Previous intolerance to losartan has been noted.  Hyperkalemia and elevated creatinine were noted.    He has also gone through phases of changes in insurance is and limited ability to afford brand name  medications.      Psoriasis:-this has been going on for several decades and is probably hereditary.  Thus far he has been avoiding any treatment.  Intermittently he itches which can be socially embarrassing.    Diabetes  He presents for his follow-up diabetic visit. He has type 2 diabetes mellitus. No MedicAlert identification noted. The initial diagnosis of diabetes was made 20 years ago. His disease course has been worsening (Recent hemoglobin A1c 6.8.). Pertinent negatives for hypoglycemia include no confusion, dizziness, headaches, nervousness/anxiousness, pallor, seizures or speech difficulty. Pertinent negatives for diabetes include no chest pain, no fatigue, no polydipsia, no polyphagia, no polyuria and no weakness. There are no hypoglycemic complications. Symptoms are worsening. Pertinent negatives for diabetic complications include no autonomic neuropathy, CVA or PVD. Risk factors for coronary artery disease include dyslipidemia, diabetes mellitus, male sex, hypertension and sedentary lifestyle. Current diabetic treatment includes oral agent (dual therapy) (Recently changed insurance and he had a gap between feeling for Trulicity..). He is compliant with treatment some of the time. His weight is increasing steadily (191-197 lbs). Meal planning includes avoidance of concentrated sweets. He has not had a previous visit with a dietitian. His home blood glucose trend is increasing steadily. His breakfast blood glucose range is generally 140-180 mg/dl. An ACE inhibitor/angiotensin II receptor blocker is contraindicated (Intolerant to Ace and ARB-hyperkalemia). He does not see a podiatrist.Eye exam is not current.   Hypertension  This is a chronic problem. The current episode started more than 1 year ago. The problem is unchanged. The problem is controlled. Pertinent negatives include no chest pain, headaches, malaise/fatigue, neck pain, palpitations or shortness of breath. There are no associated agents to  hypertension. Risk factors for coronary artery disease include male gender, dyslipidemia and diabetes mellitus. Past treatments include calcium channel blockers and diuretics. The current treatment provides moderate improvement. Compliance problems include psychosocial issues.  There is no history of angina, CVA, heart failure or PVD. There is no history of chronic renal disease, coarctation of the aorta, hyperaldosteronism, hypercortisolism, pheochromocytoma, renovascular disease or sleep apnea.   Hyperlipidemia  This is a chronic problem. The current episode started more than 1 year ago. He has no history of chronic renal disease, diabetes, hypothyroidism, liver disease or obesity. Pertinent negatives include no chest pain or shortness of breath. Current antihyperlipidemic treatment includes statins. The current treatment provides moderate improvement of lipids. Compliance problems include psychosocial issues.  Risk factors for coronary artery disease include hypertension, male sex, dyslipidemia, a sedentary lifestyle and diabetes mellitus.   Other  This is a recurrent (Hyperkalemia chronic and now hypercalcemia) problem. The current episode started more than 1 year ago. The problem has been gradually worsening. Associated symptoms include arthralgias (hand pains) and a rash (psoriasis). Pertinent negatives include no abdominal pain, chest pain, chills, congestion, coughing, diaphoresis, fatigue, fever, headaches, joint swelling, neck pain, numbness, vomiting or weakness.       Past Medical History:   Diagnosis Date    Allergy     pcn    Diabetes mellitus     Diabetes mellitus, type 2     Hyperlipidemia     Hypertension     Lab test positive for detection of COVID-19 virus 08/10/2021    Patient had tested himself at urgent care in [facility].  Minimally symptomatic at this point.  Continue with precautions.  Patient's son is positive with significant problems.    Screening for diabetic retinopathy  08/31/2023    Negative     Social History     Socioeconomic History    Marital status:      Spouse name: Sharyn Torre    Number of children: 2   Occupational History    Occupation:    Tobacco Use    Smoking status: Never    Smokeless tobacco: Never   Substance and Sexual Activity    Alcohol use: Yes    Drug use: No    Sexual activity: Yes     Partners: Female   Social History Narrative    Together 2 children- raised 6 total with Ms Coles     Social Determinants of Health     Financial Resource Strain: Medium Risk (6/23/2022)    Overall Financial Resource Strain (CARDIA)     Difficulty of Paying Living Expenses: Somewhat hard   Food Insecurity: No Food Insecurity (6/23/2022)    Hunger Vital Sign     Worried About Running Out of Food in the Last Year: Never true     Ran Out of Food in the Last Year: Never true   Transportation Needs: No Transportation Needs (6/23/2022)    PRAPARE - Transportation     Lack of Transportation (Medical): No     Lack of Transportation (Non-Medical): No   Physical Activity: Inactive (6/23/2022)    Exercise Vital Sign     Days of Exercise per Week: 0 days     Minutes of Exercise per Session: 0 min   Stress: No Stress Concern Present (6/23/2022)    [employer] [employer] - Occupational Stress Questionnaire     Feeling of Stress : Only a little   Social Connections: Moderately Integrated (6/23/2022)    Social Connection and Isolation Panel [NHANES]     Frequency of Communication with Friends and Family: Three times a week     Frequency of Social Gatherings with Friends and Family: Three times a week     Attends Mandaeism Services: 1 to 4 times per year     Active Member of Clubs or Organizations: No     Attends Club or Organization Meetings: Never     Marital Status:    Housing Stability: Low Risk  (6/23/2022)    Housing Stability Vital Sign     Unable to Pay for Housing in the Last Year: No     Number of Places Lived in the Last Year: 1      Unstable Housing in the Last Year: No     Past Surgical History:   Procedure Laterality Date    APPENDECTOMY      SPINE SURGERY       Family History   Problem Relation Age of Onset    Heart disease Father     Obesity Son        Review of Systems   Constitutional:  Positive for activity change (Retired  now.  Life is on a slower brendan now.). Negative for appetite change, chills, diaphoresis, fatigue, fever, malaise/fatigue and unexpected weight change (gained 5 lbs).   HENT:  Negative for congestion, hearing loss, rhinorrhea, sneezing and trouble swallowing.         Sinus problems.   Eyes:  Negative for pain, discharge, redness and visual disturbance.        Last eye checkup did not reveal any retinopathy.   Respiratory:  Negative for cough, chest tightness, shortness of breath and wheezing.    Cardiovascular:  Negative for chest pain, palpitations and leg swelling.        Borderline hypertension.   Gastrointestinal:  Negative for abdominal distention, abdominal pain, blood in stool, constipation, diarrhea and vomiting.   Endocrine: Negative for cold intolerance, heat intolerance, polydipsia, polyphagia and polyuria.        Diabetes mellitus.  Showing worsening with recent hemoglobin A1c of > 8.0   Genitourinary:  Negative for difficulty urinating, dysuria, frequency, hematuria and urgency.        No significant prostate symptoms   Musculoskeletal:  Positive for arthralgias (hand pains). Negative for back pain, gait problem, joint swelling and neck pain.        Patient is status post right-sided hip arthroplasty and is recovering gradually with acceptable range of motion and quality of life.   Skin:  Positive for rash (psoriasis). Negative for color change, pallor and wound.        Extensive psoriasis on the skin especially in the trunk and back.  Also has spots of vitiligo on the back and also on the hands..   Neurological:  Negative for dizziness, seizures, speech difficulty, weakness, numbness and  "headaches.        Last year he had complained of tingling and numbness in the left hand and he is doing okay now.  Probably it was due to prolonged .   Psychiatric/Behavioral:  Negative for agitation, behavioral problems, confusion and dysphoric mood. The patient is not nervous/anxious.         Euthymic mostly.         Objective:      Blood pressure 121/76, pulse 97, height 5' 8" (1.727 m), weight 83.5 kg (184 lb). Body mass index is 27.98 kg/m².  Physical Exam  Constitutional:       General: He is not in acute distress.     Appearance: He is well-developed. He is not ill-appearing, toxic-appearing or diaphoretic.      Comments: BMI is 28.43   HENT:      Head: Normocephalic and atraumatic.   Eyes:      General: No scleral icterus.  Neck:      Thyroid: No thyromegaly.      Vascular: No JVD.      Trachea: No tracheal deviation.   Cardiovascular:      Rate and Rhythm: Normal rate and regular rhythm.      Heart sounds: Normal heart sounds. No murmur heard.  Pulmonary:      Effort: No respiratory distress.      Breath sounds: Normal breath sounds. No stridor.   Abdominal:      General: There is no distension.      Palpations: Abdomen is soft.      Tenderness: There is no abdominal tenderness. There is no rebound.   Musculoskeletal:         General: No tenderness or deformity.      Cervical back: No tenderness.      Right lower leg: No edema.      Left lower leg: No edema.        Feet:    Feet:      Right foot:      Protective Sensation: 5 sites tested.  5 sites sensed.      Toenail Condition: Right toenails are long.      Left foot:      Protective Sensation: 5 sites tested.  5 sites sensed.      Toenail Condition: Left toenails are long.      Comments:   Slightly dystrophic nails on the left side.    Somewhat shiny skin on the toes.  Lymphadenopathy:      Cervical: No cervical adenopathy.   Skin:     General: Skin is dry.      Findings: Rash (psoriasis.  Seems to be getting better and fading away.) present. "      Comments: Extensive psoriasis noted on the skin in the trunk and back.  Vitiligo on the upper shoulder and upper back.  This runs in the family including mother and perhaps 1 of the sisters.  Also patient's son has it.   Neurological:      Mental Status: He is alert. Mental status is at baseline.   Psychiatric:         Behavior: Behavior normal.           Assessment:       No visits with results within 3 Month(s) from this visit.   Latest known visit with results is:   Office Visit on 11/14/2023   Component Date Value Ref Range Status    Cholesterol 03/08/2024 132  100 - 199 mg/dL Final    Triglycerides 03/08/2024 98  0 - 149 mg/dL Final    HDL 03/08/2024 39 (L)  >39 mg/dL Final    VLDL Cholesterol Dinesh 03/08/2024 18  5 - 40 mg/dL Final    LDL Calculated 03/08/2024 75  0 - 99 mg/dL Final    Hemoglobin A1c 03/08/2024 7.4 (H)  4.8 - 5.6 % Final    Glucose 03/08/2024 136 (H)  70 - 99 mg/dL Final    BUN 03/08/2024 16  8 - 27 mg/dL Final    Creatinine 03/08/2024 0.95  0.76 - 1.27 mg/dL Final    eGFR 03/08/2024 87  >59 mL/min/1.73 Final    BUN/Creatinine Ratio 03/08/2024 17  10 - 24 Final    Sodium 03/08/2024 142  134 - 144 mmol/L Final    Potassium 03/08/2024 5.6 (H)  3.5 - 5.2 mmol/L Final    Chloride 03/08/2024 101  96 - 106 mmol/L Final    CO2 03/08/2024 25  20 - 29 mmol/L Final    Calcium 03/08/2024 10.1  8.6 - 10.2 mg/dL Final    Protein, Total 03/08/2024 7.1  6.0 - 8.5 g/dL Final    Albumin 03/08/2024 4.7  3.9 - 4.9 g/dL Final    Globulin, Total 03/08/2024 2.4  1.5 - 4.5 g/dL Final    Albumin/Globulin Ratio 03/08/2024 2.0  1.2 - 2.2 Final    Total Bilirubin 03/08/2024 0.6  0.0 - 1.2 mg/dL Final    Alkaline Phosphatase 03/08/2024 75  44 - 121 IU/L Final    AST 03/08/2024 19  0 - 40 IU/L Final    ALT 03/08/2024 19  0 - 44 IU/L Final     Component Ref Range & Units 6 d ago  (3/8/24) 4 mo ago  (11/2/23) 7 mo ago  (7/19/23) 7 mo ago  (7/19/23) 11 mo ago  (4/12/23) 1 yr ago  (10/17/22) 1 yr ago  (5/20/22)    Hemoglobin A1c 4.8 - 5.6 % 7.4 High  8.1 High  CM 8.9 High  CM 8.9 Abnormal  R 6.9 High  CM 7.2 High  CM 6.8 High  CM   Comment:          Prediabetes: 5.7 - 6.4     1. Type 2 diabetes mellitus without complication, without long-term current use of insulin  Comments:  A1c level has come down to 7.4.  Trulicity 4.5, metformin 1000 b.i.d., Farxiga 10 mg    2. Essential hypertension  Comments:  Currently on amlodipine 10 mg.  ACE inhibitor caused hyperkalemia.  Overview:  Long-standing hypertension or diabetes    Orders:  -     Basic Metabolic Panel; Future; Expected date: 03/28/2024    3. Mixed dyslipidemia    4. Hypercalcemia  Comments:  Intermittent hypercalcemia.  Cause unknown.  Overview:  Intermittent hypercalcemia.  Cause unknown.      5. Hyperkalemia  Comments:  Intermittently his potassium level goes high.  He has not on any Ace or ARB.  Not on any potassium supplements or spironolactone.  Overview:  5.7    Orders:  -     Basic Metabolic Panel; Future; Expected date: 03/28/2024    6. Psoriasis  Comments:  Recently was prescribed Tremfya but could not afford the copayment of greater than 1000 dollars.    7. Adverse effect of angiotensin 2 receptor antagonist, subsequent encounter  Comments:  Hyperkalemia and creatinine elevation.  Reversed after stopping  Overview:  Hyperkalemia and creatinine elevation.  Reversed after stopping               Plan:   Type 2 diabetes mellitus without complication, without long-term current use of insulin  Comments:  A1c level has come down to 7.4.  Trulicity 4.5, metformin 1000 b.i.d., Farxiga 10 mg    Essential hypertension  Comments:  Currently on amlodipine 10 mg.  ACE inhibitor caused hyperkalemia.  Orders:  -     Basic Metabolic Panel; Future; Expected date: 03/28/2024    Mixed dyslipidemia    Hypercalcemia  Comments:  Intermittent hypercalcemia.  Cause unknown.    Hyperkalemia  Comments:  Intermittently his potassium level goes high.  He has not on any Ace or ARB.  Not on  any potassium supplements or spironolactone.  Orders:  -     Basic Metabolic Panel; Future; Expected date: 03/28/2024    Psoriasis  Comments:  Recently was prescribed Tremfya but could not afford the copayment of greater than 1000 dollars.    Adverse effect of angiotensin 2 receptor antagonist, subsequent encounter  Comments:  Hyperkalemia and creatinine elevation.  Reversed after stopping    The cause of his potassium elevation remains still unknown but it could be diabetes affecting the kidneys.    Prescribing medications like Lokelma might be prohibitively expensive.  Will continue to monitor his potassium levels and probably he might need a dose or 2 of Kayexalate intermittently.      Blood pressures are doing okay.    Psoriasis has been noted and it did well with Tremfya.  Howeve number the how are you doing on your r the cost is prohibitive.    His hemoglobin A1c has come down recently to 7.4    Hopefully the next number is better.      Advised Pt about age and season appropriate immunizations/ cancer screenings.  Also seasonal influenza vaccine, update on tetanus diphtheria vaccination every 10 years.  Patient has been advised to watch diet and exercise. Avoid fried and fatty food. Compliance to medications and follow up urged.  Advised Pt. to monitor Blood sugars at home and record them.  Advised Pt  for Anti reflux measures like small feequent meals, avoid spicy and greasy food. Head end up at night.  keep a close eye on feet and keep them clean. Annual eye examination. Annual influenza vaccine.  Monitor HgbA1c every 3 to 6 months. Monitor urine microalbumin every year.keep LDL less than 100. Monitor blood pressure and target blood pressure 120/70.  Please utilize precautions for current COVID-19 pandemic.  Try to avoid crowds or close contact with multiple people.  Minimize outside interaction.  Wash hands with soap for  frequently upon contact.Use face mask or cover.    Fup-4 months    Spent julee  36 minutes with patient which involved review of pts medical conditions, labs, medications and with 50% of time face-to-face discussion about medical problems, management and any applicable changes.  Follow up in about 4 months (around 7/14/2024), or if symptoms worsen or fail to improve, for Diabetes/HTN/Lipids.      Current Outpatient Medications:     amLODIPine (NORVASC) 10 MG tablet, TAKE 1 TABLET BY MOUTH EVERY DAY, Disp: 90 tablet, Rfl: 1    aspirin 81 MG Chew, 1 tablet once daily., Disp: , Rfl:     atorvastatin (LIPITOR) 40 MG tablet, TAKE 1 TABLET BY MOUTH EVERY MORNING, Disp: 90 tablet, Rfl: 3    cetirizine 10 mg Cap, Take 1 tablet by mouth once daily., Disp: , Rfl:     dulaglutide (TRULICITY) 4.5 mg/0.5 mL pen injector, Inject 4.5 mg into the skin every 7 days., Disp: 4 pen , Rfl: 11    FARXIGA 10 mg tablet, Take 10 mg by mouth every morning., Disp: , Rfl:     metFORMIN (GLUCOPHAGE) 500 MG tablet, Take 2 tablets (1,000 mg total) by mouth 2 (two) times daily., Disp: 360 tablet, Rfl: 3    pantoprazole (PROTONIX) 40 MG tablet, TAKE 1 TABLET BY MOUTH ONCE DAILY BEFORE BREAKFAST. DO not crush, chew, OR split, Disp: , Rfl:     triamcinolone acetonide 0.1% (KENALOG) 0.1 % cream, AAA bid, Disp: 454 g, Rfl: 3    turmeric root extract 500 mg Cap, Take 1 tablet by mouth 2 (two) times daily., Disp: , Rfl:     vit C/E/Zn/coppr/lutein/zeaxan (PRESERVISION AREDS-2 ORAL), , Disp: , Rfl:     Salazar Campbell    Reviewed best practice advisory suggestion.  It had indicated diabetes with nephropathy.  Perhaps on 1 occasion his creatinine level was elevated which could have been due to side effects of ACE inhibitors.  After that his creatinine levels have been okay.  He has no retinopathy also.

## 2024-03-15 DIAGNOSIS — E11.9 TYPE 2 DIABETES MELLITUS WITHOUT COMPLICATION, WITHOUT LONG-TERM CURRENT USE OF INSULIN: ICD-10-CM

## 2024-03-15 DIAGNOSIS — E11.22 DIABETES MELLITUS WITH STAGE 2 CHRONIC KIDNEY DISEASE: ICD-10-CM

## 2024-03-15 DIAGNOSIS — N18.9 ANEMIA IN CHRONIC RENAL DISEASE: ICD-10-CM

## 2024-03-15 DIAGNOSIS — I10 HYPERTENSION: ICD-10-CM

## 2024-03-15 DIAGNOSIS — N18.2 CHRONIC KIDNEY DISEASE, STAGE II (MILD): Primary | ICD-10-CM

## 2024-03-15 DIAGNOSIS — D63.1 ANEMIA IN CHRONIC RENAL DISEASE: ICD-10-CM

## 2024-03-15 DIAGNOSIS — N18.2 DIABETES MELLITUS WITH STAGE 2 CHRONIC KIDNEY DISEASE: ICD-10-CM

## 2024-03-18 RX ORDER — METFORMIN HYDROCHLORIDE 500 MG/1
1000 TABLET ORAL 2 TIMES DAILY
Qty: 360 TABLET | Refills: 3 | Status: SHIPPED | OUTPATIENT
Start: 2024-03-18

## 2024-03-20 ENCOUNTER — HOSPITAL ENCOUNTER (OUTPATIENT)
Dept: RADIOLOGY | Facility: HOSPITAL | Age: 69
Discharge: HOME | End: 2024-03-20
Payer: MEDICARE

## 2024-03-20 DIAGNOSIS — I10 HYPERTENSION: ICD-10-CM

## 2024-03-20 DIAGNOSIS — N18.2 DIABETES MELLITUS WITH STAGE 2 CHRONIC KIDNEY DISEASE: ICD-10-CM

## 2024-03-20 DIAGNOSIS — D63.1 ANEMIA IN CHRONIC RENAL DISEASE: ICD-10-CM

## 2024-03-20 DIAGNOSIS — E11.22 DIABETES MELLITUS WITH STAGE 2 CHRONIC KIDNEY DISEASE: ICD-10-CM

## 2024-03-20 DIAGNOSIS — N18.2 CHRONIC KIDNEY DISEASE, STAGE II (MILD): ICD-10-CM

## 2024-03-20 DIAGNOSIS — N18.9 ANEMIA IN CHRONIC RENAL DISEASE: ICD-10-CM

## 2024-03-20 PROCEDURE — 76775 US EXAM ABDO BACK WALL LIM: CPT | Mod: TC,PO

## 2024-04-16 RX ORDER — DAPAGLIFLOZIN 10 MG/1
10 TABLET, FILM COATED ORAL EVERY MORNING
Qty: 30 TABLET | Refills: 5 | Status: SHIPPED | OUTPATIENT
Start: 2024-04-16

## 2024-04-27 LAB
BUN SERPL-MCNC: 16 MG/DL (ref 8–27)
BUN/CREAT SERPL: 14 (ref 10–24)
CALCIUM SERPL-MCNC: 9.6 MG/DL (ref 8.6–10.2)
CHLORIDE SERPL-SCNC: 99 MMOL/L (ref 96–106)
CO2 SERPL-SCNC: 23 MMOL/L (ref 20–29)
CREAT SERPL-MCNC: 1.18 MG/DL (ref 0.76–1.27)
EST. GFR  (NO RACE VARIABLE): 67 ML/MIN/1.73
GLUCOSE SERPL-MCNC: 133 MG/DL (ref 70–99)
POTASSIUM SERPL-SCNC: 5 MMOL/L (ref 3.5–5.2)
SODIUM SERPL-SCNC: 137 MMOL/L (ref 134–144)

## 2024-06-02 DIAGNOSIS — I10 BENIGN ESSENTIAL HYPERTENSION: ICD-10-CM

## 2024-06-03 RX ORDER — AMLODIPINE BESYLATE 10 MG/1
10 TABLET ORAL
Qty: 90 TABLET | Refills: 1 | Status: SHIPPED | OUTPATIENT
Start: 2024-06-03

## 2024-07-20 NOTE — PROGRESS NOTES
Subjective:       Patient ID: Mg Torre is a 69 y.o. male.    Chief Complaint: Hypertension, Diabetes, Follow-up, Hyperlipidemia, and Dizziness (Lightheaded)    Mr. Mg Torre is a 69-year-old gentleman who comes for 3-4  months follow-up.  He is accompanied with his wife who tends to keep an eye on his medical as well as executive issues.    Patient was seen a few months back at that point his potassium levels were elevated. He was advised to follow up with the nephrology also.  His nephrologist Dr. Erika Aguayo has relocated which may make him difficult to get a follow-up.  His last potassium level was on the edge at 5.2.    Urine protein levels are OK    Medical issues are as below:-    1. Type 2 diabetes mellitus currently on metformin, injection Trulicity which was increased to 4.5 mg previously -weight loss has stabilized.  His highest weight was about 199 lb and currently it is 184 lb.  2.-dyslipidemia  3.-hypertension  4.-longstanding underlying psoriasis  5.-history of COVID-19.  6.-gastroesophageal reflux on pantoprazole  7.-recent prescription noted for Tremfya (guselkumab)-had seen Dr. Matheus Mcneil MD dermatology and also screened for potential infections including hepatitis-B panel and hepatitis-C.    Recent labs have shown an elevated potassium.  He has not on any potassium supplements.    Pictures taken by Dr. Mcneil indicates extensive psoriatic plaques.  Also cautery of a suspected lesion was performed    He has found significant relief with injection Tremfya.  The cost is another issue and probably there might be another alternative treatment at this point.  Most of his lesions have dissipated or minimized.    Another issue seems to be lightheaded episodes and dizzy episodes.  Especially when he comes back from the heat.    Off late he was started a Himalayan Tea as a supplements.  This is a cold T.              Control of diabetes has been fluctuant and patchy with some good  phases in past and some equally bad phases in past.  Recent A1c level has shows a rise again indicating poor control    During his elier.  It was difficult to be aggressive for fear of hypoglycemia.    Currently he is on Trulicity at 3 mg per week.  He is also on metformin.  Previously he was on glipizide which was discontinued secondary to crossing age above 65 and further risks for this population.    He continues with atorvastatin cholesterol, amlodipine for blood pressure.  He is not on Ace or ARB.  Previous intolerance to losartan has been noted.  Hyperkalemia and elevated creatinine were noted.    He has also gone through phases of changes in insurance is and limited ability to afford brand name medications.      Psoriasis:-this has been going on for several decades and is probably hereditary.  Thus far he has been avoiding any treatment.  Intermittently he itches which can be socially embarrassing.    Diabetes  He presents for his follow-up diabetic visit. He has type 2 diabetes mellitus. No MedicAlert identification noted. The initial diagnosis of diabetes was made 20 years ago. His disease course has been worsening (Recent hemoglobin A1c 6.8.). Hypoglycemia symptoms include dizziness. Pertinent negatives for hypoglycemia include no confusion, headaches, nervousness/anxiousness, pallor, seizures or speech difficulty. Pertinent negatives for diabetes include no chest pain, no fatigue, no polydipsia, no polyphagia, no polyuria and no weakness. There are no hypoglycemic complications. Symptoms are worsening. Pertinent negatives for diabetic complications include no autonomic neuropathy, CVA or PVD. Risk factors for coronary artery disease include dyslipidemia, diabetes mellitus, male sex, hypertension and sedentary lifestyle. Current diabetic treatment includes oral agent (dual therapy) (Recently changed insurance and he had a gap between feeling for Trulicity..). He is compliant with treatment some of the  time. His weight is increasing steadily (191-197 lbs). Meal planning includes avoidance of concentrated sweets. He has not had a previous visit with a dietitian. His home blood glucose trend is increasing steadily. His breakfast blood glucose range is generally 140-180 mg/dl. An ACE inhibitor/angiotensin II receptor blocker is contraindicated (Intolerant to Ace and ARB-hyperkalemia). He does not see a podiatrist.Eye exam is not current.   Hypertension  This is a chronic problem. The current episode started more than 1 year ago. The problem is unchanged. The problem is controlled. Pertinent negatives include no chest pain, headaches, malaise/fatigue, neck pain, palpitations or shortness of breath. There are no associated agents to hypertension. Risk factors for coronary artery disease include male gender, dyslipidemia and diabetes mellitus. Past treatments include calcium channel blockers and diuretics. The current treatment provides moderate improvement. Compliance problems include psychosocial issues.  There is no history of angina, CVA, heart failure or PVD. There is no history of chronic renal disease, coarctation of the aorta, hyperaldosteronism, hypercortisolism, pheochromocytoma, renovascular disease or sleep apnea.   Hyperlipidemia  This is a chronic problem. The current episode started more than 1 year ago. He has no history of chronic renal disease, diabetes, hypothyroidism, liver disease or obesity. Pertinent negatives include no chest pain or shortness of breath. Current antihyperlipidemic treatment includes statins. The current treatment provides moderate improvement of lipids. Compliance problems include psychosocial issues.  Risk factors for coronary artery disease include hypertension, male sex, dyslipidemia, a sedentary lifestyle and diabetes mellitus.   Other  This is a recurrent (Hyperkalemia chronic and now hypercalcemia) problem. The current episode started more than 1 year ago. The problem has  been gradually worsening. Associated symptoms include arthralgias (hand pains) and a rash (psoriasis). Pertinent negatives include no abdominal pain, chest pain, chills, congestion, coughing, diaphoresis, fatigue, fever, headaches, joint swelling, neck pain, numbness, vomiting or weakness.       Past Medical History:   Diagnosis Date    Allergy     pcn    Diabetes mellitus     Diabetes mellitus, type 2     Hyperlipidemia     Hypertension     Lab test positive for detection of COVID-19 virus 08/10/2021    Patient had tested himself at urgent care in [facility].  Minimally symptomatic at this point.  Continue with precautions.  Patient's son is positive with significant problems.    Screening for diabetic retinopathy 08/31/2023    Negative     Social History     Socioeconomic History    Marital status:      Spouse name: Sharyn Torre    Number of children: 2   Occupational History    Occupation:    Tobacco Use    Smoking status: Never    Smokeless tobacco: Never   Substance and Sexual Activity    Alcohol use: Yes    Drug use: No    Sexual activity: Yes     Partners: Female   Social History Narrative    Together 2 children- raised 6 total with Ms Coles     Social Determinants of Health     Financial Resource Strain: Medium Risk (6/23/2022)    Overall Financial Resource Strain (CARDIA)     Difficulty of Paying Living Expenses: Somewhat hard   Food Insecurity: No Food Insecurity (6/23/2022)    Hunger Vital Sign     Worried About Running Out of Food in the Last Year: Never true     Ran Out of Food in the Last Year: Never true   Transportation Needs: No Transportation Needs (6/23/2022)    PRAPARE - Transportation     Lack of Transportation (Medical): No     Lack of Transportation (Non-Medical): No   Physical Activity: Inactive (6/23/2022)    Exercise Vital Sign     Days of Exercise per Week: 0 days     Minutes of Exercise per Session: 0 min   Stress: No Stress Concern Present  "(6/23/2022)    [employer] [place] [employer] - Occupational Stress Questionnaire     Feeling of Stress : Only a little   Housing Stability: Low Risk  (6/23/2022)    Housing Stability Vital Sign     Unable to Pay for Housing in the Last Year: No     Number of Places Lived in the Last Year: 1     Unstable Housing in the Last Year: No     Past Surgical History:   Procedure Laterality Date    APPENDECTOMY      SPINE SURGERY       Family History   Problem Relation Name Age of Onset    Heart disease Father [place]     Obesity Son         Review of Systems   Constitutional:  Negative for activity change, chills, diaphoresis, fatigue, fever and malaise/fatigue.   HENT:  Negative for congestion.    Eyes:  Negative for pain, discharge and visual disturbance.   Respiratory:  Negative for cough, chest tightness and shortness of breath.    Cardiovascular:  Negative for chest pain, palpitations and leg swelling.   Gastrointestinal:  Negative for abdominal pain, blood in stool and vomiting.   Endocrine: Negative for polydipsia, polyphagia and polyuria.        Type 2 diabetes with reasonable control.   Genitourinary:  Negative for difficulty urinating and genital sores.   Musculoskeletal:  Positive for arthralgias (hand pains). Negative for joint swelling and neck pain.   Skin:  Positive for rash (psoriasis). Negative for pallor.   Neurological:  Positive for dizziness. Negative for seizures, speech difficulty, weakness, numbness and headaches.   Psychiatric/Behavioral:  Negative for confusion. The patient is not nervous/anxious.          Objective:      Blood pressure 130/76, pulse (P) 85, height 5' 8" (1.727 m), weight 83.5 kg (184 lb). Body mass index is 27.98 kg/m².    Blood pressure on sitting is 125/78 with a pulse of 82.      On standing:-116/75 with a pulse of 87.  Physical Exam  Constitutional:       General: He is not in acute distress.     Appearance: He is well-developed. He is not " ill-appearing, toxic-appearing or diaphoretic.      Comments: BMI is 27.98   HENT:      Head: Normocephalic and atraumatic.   Eyes:      General: No scleral icterus.  Neck:      Thyroid: No thyromegaly.      Vascular: No JVD.      Trachea: No tracheal deviation.   Cardiovascular:      Rate and Rhythm: Normal rate and regular rhythm.      Heart sounds: Normal heart sounds. No murmur heard.  Pulmonary:      Effort: No respiratory distress.      Breath sounds: Normal breath sounds. No stridor.   Abdominal:      General: There is no distension.      Palpations: Abdomen is soft.      Tenderness: There is no abdominal tenderness. There is no rebound.   Musculoskeletal:         General: No tenderness or deformity.      Cervical back: No tenderness.      Right lower leg: No edema.      Left lower leg: No edema.        Feet:    Feet:      Right foot:      Protective Sensation: 5 sites tested.  5 sites sensed.      Left foot:      Protective Sensation: 5 sites tested.  5 sites sensed.      Comments: The left 5th toenail is missing now.    Somewhat shiny skin on the toes.  Shiny skin.  Lymphadenopathy:      Cervical: No cervical adenopathy.   Skin:     General: Skin is dry.      Findings: Rash (psoriasis.  Seems to be getting better and fading away.) present.      Comments: Extensive psoriasis noted on the skin in the trunk and back.  Vitiligo on the upper shoulder and upper back.  This runs in the family including mother and perhaps 1 of the sisters.  Also patient's son has it.   Neurological:      Mental Status: He is alert. Mental status is at baseline.   Psychiatric:         Behavior: Behavior normal.           Assessment:       No visits with results within 3 Month(s) from this visit.   Latest known visit with results is:   Office Visit on 03/14/2024   Component Date Value Ref Range Status    Glucose 04/26/2024 133 (H)  70 - 99 mg/dL Final    BUN 04/26/2024 16  8 - 27 mg/dL Final    Creatinine 04/26/2024 1.18  0.76 - 1.27  mg/dL Final    eGFR 04/26/2024 67  >59 mL/min/1.73 Final    BUN/Creatinine Ratio 04/26/2024 14  10 - 24 Final    Sodium 04/26/2024 137  134 - 144 mmol/L Final    Potassium 04/26/2024 5.0  3.5 - 5.2 mmol/L Final    Chloride 04/26/2024 99  96 - 106 mmol/L Final    CO2 04/26/2024 23  20 - 29 mmol/L Final    Calcium 04/26/2024 9.6  8.6 - 10.2 mg/dL Final       1. Type 2 diabetes mellitus without complication, without long-term current use of insulin  Comments:  Check A1c levels.  Check microalbumin.  Orders:  -     Hemoglobin A1C; Future; Expected date: 07/24/2024  -     Basic Metabolic Panel; Future; Expected date: 07/24/2024    2. Benign essential hypertension  Comments:  Currently on amlodipine 10 mg and if blood pressures show lowering, will reduce it to 5 mg.  Ace/ARB cause hyperkalemia.  Orders:  -     Basic Metabolic Panel; Future; Expected date: 07/24/2024  -     amLODIPine (NORVASC) 5 MG tablet; Take 1 tablet (5 mg total) by mouth once daily.  Dispense: 90 tablet; Refill: 3    3. Mixed dyslipidemia  -     Basic Metabolic Panel; Future; Expected date: 07/24/2024    4. Hypercalcemia  Overview:  Intermittent hypercalcemia.  Cause unknown.      5. Hyperkalemia  Comments:  Stable at this point and will check labs again.  Off Ace and ARB.  Overview:  5.7    Orders:  -     Ambulatory referral/consult to Nephrology; Future; Expected date: 08/23/2024    6. Psoriasis    7. Adverse effect of angiotensin 2 receptor antagonist, subsequent encounter  Overview:  Hyperkalemia and creatinine elevation.  Reversed after stopping      8. Dizziness    9. Abnormal serum creatinine level  -     Ambulatory referral/consult to Nephrology; Future; Expected date: 08/23/2024    10. Screening for prostate cancer  -     PSA, SCREENING; Future; Expected date: 07/24/2024    11. Type 2 diabetes mellitus with stage 3a chronic kidney disease, without long-term current use of insulin           1 Result Note       1 Patient Communication       1  "HM Topic             Component Ref Range & Units 4 mo ago  (3/8/24) 8 mo ago  (11/2/23) 1 yr ago  (7/19/23) 1 yr ago  (7/19/23) 1 yr ago  (4/12/23) 1 yr ago  (10/17/22) 2 yr ago  (5/20/22)   Hemoglobin A1c 4.8 - 5.6 % 7.4 High  8.1 High  CM 8.9 High  CM 8.9 Abnormal  R 6.9 High  CM 7.2 High  CM 6.8 High             Plan:   Type 2 diabetes mellitus without complication, without long-term current use of insulin  Comments:  Check A1c levels.  Check microalbumin.  Orders:  -     Hemoglobin A1C; Future; Expected date: 07/24/2024  -     Basic Metabolic Panel; Future; Expected date: 07/24/2024    Benign essential hypertension  Comments:  Currently on amlodipine 10 mg and if blood pressures show lowering, will reduce it to 5 mg.  Ace/ARB cause hyperkalemia.  Orders:  -     Basic Metabolic Panel; Future; Expected date: 07/24/2024  -     amLODIPine (NORVASC) 5 MG tablet; Take 1 tablet (5 mg total) by mouth once daily.  Dispense: 90 tablet; Refill: 3    Mixed dyslipidemia  -     Basic Metabolic Panel; Future; Expected date: 07/24/2024    Hypercalcemia    Hyperkalemia  Comments:  Stable at this point and will check labs again.  Off Ace and ARB.  Orders:  -     Ambulatory referral/consult to Nephrology; Future; Expected date: 08/23/2024    Psoriasis    Adverse effect of angiotensin 2 receptor antagonist, subsequent encounter    Dizziness    Abnormal serum creatinine level  -     Ambulatory referral/consult to Nephrology; Future; Expected date: 08/23/2024    Screening for prostate cancer  -     PSA, SCREENING; Future; Expected date: 07/24/2024    Type 2 diabetes mellitus with stage 3a chronic kidney disease, without long-term current use of insulin      Overall "Cari" is doing okay.  His blood sugars are generally okay.  Blood pressures are also okay.      As far as dizziness is concerned, I did check his orthostatics and negative so far.  Mild expected drop in blood pressure but nothing major and there was no reproduction of " symptoms.    I also made him bend over to tie his shoe laces and then stood him up with head extended in different directions and except for mild off the kilter feeling, no major dizziness.    He does feel dizzy when he comes back from hot sun outside.  I have advised him that he is not a young 20-year-old anymore as much as he might like think, and take it easy especially with his medical problems.    Please get the eye examination done also.  Flu shot in 4 months time.    In 4 months will also see how his Himalayan Tea is benefitting him.  Advised Mr. Torre to monitor Blood sugars at home and record them.  Exercise, watch diet and loose weight.  keep a close eye on feet and keep them clean. Annual eye examination. Annual influenza vaccine.  Monitor HgbA1c every 3 to 6 months. Monitor urine microalbumin every year.keep LDL less than 100. Monitor blood pressure and target blood pressure 120/70.        Follow up in about 4 months (around 11/23/2024), or if symptoms worsen or fail to improve, for Diabetes/HTN/Lipids.      Current Outpatient Medications:     aspirin 81 MG Chew, 1 tablet once daily., Disp: , Rfl:     atorvastatin (LIPITOR) 40 MG tablet, TAKE 1 TABLET BY MOUTH EVERY MORNING, Disp: 90 tablet, Rfl: 3    cetirizine 10 mg Cap, Take 1 tablet by mouth once daily., Disp: , Rfl:     dapagliflozin propanediol (FARXIGA) 10 mg tablet, TAKE 1 TABLET BY MOUTH ONCE DAILY IN THE MORNING, Disp: 30 tablet, Rfl: 5    dulaglutide (TRULICITY) 4.5 mg/0.5 mL pen injector, Inject 4.5 mg into the skin every 7 days., Disp: 4 pen , Rfl: 11    metFORMIN (GLUCOPHAGE) 500 MG tablet, TAKE TWO TABLETS BY MOUTH TWICE DAILY, Disp: 360 tablet, Rfl: 3    pantoprazole (PROTONIX) 40 MG tablet, TAKE 1 TABLET BY MOUTH ONCE DAILY BEFORE BREAKFAST. DO not crush, chew, OR split, Disp: , Rfl:     triamcinolone acetonide 0.1% (KENALOG) 0.1 % cream, AAA bid, Disp: 454 g, Rfl: 3    turmeric root extract 500 mg Cap, Take 1 tablet by mouth 2 (two)  times daily., Disp: , Rfl:     vit C/E/Zn/coppr/lutein/zeaxan (PRESERVISION AREDS-2 ORAL), , Disp: , Rfl:     amLODIPine (NORVASC) 5 MG tablet, Take 1 tablet (5 mg total) by mouth once daily., Disp: 90 tablet, Rfl: 3    Salazar Campbell

## 2024-07-23 ENCOUNTER — OFFICE VISIT (OUTPATIENT)
Dept: FAMILY MEDICINE | Facility: CLINIC | Age: 69
End: 2024-07-23
Payer: MEDICARE

## 2024-07-23 VITALS
BODY MASS INDEX: 27.89 KG/M2 | HEIGHT: 68 IN | DIASTOLIC BLOOD PRESSURE: 76 MMHG | SYSTOLIC BLOOD PRESSURE: 130 MMHG | WEIGHT: 184 LBS

## 2024-07-23 DIAGNOSIS — I10 BENIGN ESSENTIAL HYPERTENSION: ICD-10-CM

## 2024-07-23 DIAGNOSIS — E78.2 MIXED DYSLIPIDEMIA: ICD-10-CM

## 2024-07-23 DIAGNOSIS — T46.5X5D ADVERSE EFFECT OF ANGIOTENSIN 2 RECEPTOR ANTAGONIST, SUBSEQUENT ENCOUNTER: ICD-10-CM

## 2024-07-23 DIAGNOSIS — R79.89 ABNORMAL SERUM CREATININE LEVEL: ICD-10-CM

## 2024-07-23 DIAGNOSIS — N18.31 TYPE 2 DIABETES MELLITUS WITH STAGE 3A CHRONIC KIDNEY DISEASE, WITHOUT LONG-TERM CURRENT USE OF INSULIN: ICD-10-CM

## 2024-07-23 DIAGNOSIS — Z12.5 SCREENING FOR PROSTATE CANCER: ICD-10-CM

## 2024-07-23 DIAGNOSIS — E11.22 TYPE 2 DIABETES MELLITUS WITH STAGE 3A CHRONIC KIDNEY DISEASE, WITHOUT LONG-TERM CURRENT USE OF INSULIN: ICD-10-CM

## 2024-07-23 DIAGNOSIS — E87.5 HYPERKALEMIA: ICD-10-CM

## 2024-07-23 DIAGNOSIS — L40.9 PSORIASIS: ICD-10-CM

## 2024-07-23 DIAGNOSIS — E83.52 HYPERCALCEMIA: ICD-10-CM

## 2024-07-23 DIAGNOSIS — E11.9 TYPE 2 DIABETES MELLITUS WITHOUT COMPLICATION, WITHOUT LONG-TERM CURRENT USE OF INSULIN: Primary | Chronic | ICD-10-CM

## 2024-07-23 DIAGNOSIS — R42 DIZZINESS: ICD-10-CM

## 2024-07-23 PROCEDURE — 3075F SYST BP GE 130 - 139MM HG: CPT | Mod: CPTII,S$GLB,, | Performed by: INTERNAL MEDICINE

## 2024-07-23 PROCEDURE — 1126F AMNT PAIN NOTED NONE PRSNT: CPT | Mod: CPTII,S$GLB,, | Performed by: INTERNAL MEDICINE

## 2024-07-23 PROCEDURE — 1160F RVW MEDS BY RX/DR IN RCRD: CPT | Mod: CPTII,S$GLB,, | Performed by: INTERNAL MEDICINE

## 2024-07-23 PROCEDURE — 3008F BODY MASS INDEX DOCD: CPT | Mod: CPTII,S$GLB,, | Performed by: INTERNAL MEDICINE

## 2024-07-23 PROCEDURE — 3078F DIAST BP <80 MM HG: CPT | Mod: CPTII,S$GLB,, | Performed by: INTERNAL MEDICINE

## 2024-07-23 PROCEDURE — 99999 PR PBB SHADOW E&M-EST. PATIENT-LVL III: CPT | Mod: PBBFAC,,, | Performed by: INTERNAL MEDICINE

## 2024-07-23 PROCEDURE — 3288F FALL RISK ASSESSMENT DOCD: CPT | Mod: CPTII,S$GLB,, | Performed by: INTERNAL MEDICINE

## 2024-07-23 PROCEDURE — 1101F PT FALLS ASSESS-DOCD LE1/YR: CPT | Mod: CPTII,S$GLB,, | Performed by: INTERNAL MEDICINE

## 2024-07-23 PROCEDURE — 1159F MED LIST DOCD IN RCRD: CPT | Mod: CPTII,S$GLB,, | Performed by: INTERNAL MEDICINE

## 2024-07-23 PROCEDURE — 99214 OFFICE O/P EST MOD 30 MIN: CPT | Mod: S$GLB,,, | Performed by: INTERNAL MEDICINE

## 2024-07-23 PROCEDURE — 3051F HG A1C>EQUAL 7.0%<8.0%: CPT | Mod: CPTII,S$GLB,, | Performed by: INTERNAL MEDICINE

## 2024-07-23 RX ORDER — AMLODIPINE BESYLATE 5 MG/1
5 TABLET ORAL DAILY
Qty: 90 TABLET | Refills: 3 | Status: SHIPPED | OUTPATIENT
Start: 2024-07-23 | End: 2025-07-23

## 2024-08-06 DIAGNOSIS — E11.9 TYPE 2 DIABETES MELLITUS WITHOUT COMPLICATION, WITHOUT LONG-TERM CURRENT USE OF INSULIN: Chronic | ICD-10-CM

## 2024-08-06 RX ORDER — DULAGLUTIDE 4.5 MG/.5ML
INJECTION, SOLUTION SUBCUTANEOUS
Qty: 4 PEN | Refills: 11 | Status: SHIPPED | OUTPATIENT
Start: 2024-08-06

## 2024-08-07 ENCOUNTER — TELEPHONE (OUTPATIENT)
Dept: NEPHROLOGY | Facility: CLINIC | Age: 69
End: 2024-08-07
Payer: MEDICARE

## 2024-08-07 NOTE — TELEPHONE ENCOUNTER
----- Message from Geri Rodriguez sent at 8/7/2024 10:50 AM CDT -----  non-specific  Pt is trying to make appt but I cannot find anything open  Please call to advise  [phone]

## 2024-08-07 NOTE — TELEPHONE ENCOUNTER
Patient needs an earlier appointment time around 10 am. I will send this message to Eve for scheduling.Patient will check my chart

## 2024-08-12 ENCOUNTER — TELEPHONE (OUTPATIENT)
Dept: FAMILY MEDICINE | Facility: CLINIC | Age: 69
End: 2024-08-12
Payer: MEDICARE

## 2024-08-12 NOTE — TELEPHONE ENCOUNTER
----- Message from Salazar Campbell MD sent at 8/11/2024  7:44 AM CDT -----   Please notify patient that his potassium level is high.  Hemoglobin A1c is okay at 7.2.  Drink lot of water and remain hydrated and will have to check the labs again.  High potassium can cause problems.  Including heart rhythm problems.  If he was eating a lot of nuts or fruits, he needs to cut down.

## 2024-08-30 ENCOUNTER — TELEPHONE (OUTPATIENT)
Dept: FAMILY MEDICINE | Facility: CLINIC | Age: 69
End: 2024-08-30
Payer: MEDICARE

## 2024-08-30 DIAGNOSIS — I10 BENIGN ESSENTIAL HYPERTENSION: ICD-10-CM

## 2024-08-30 DIAGNOSIS — E11.9 TYPE 2 DIABETES MELLITUS WITHOUT COMPLICATION, WITHOUT LONG-TERM CURRENT USE OF INSULIN: ICD-10-CM

## 2024-08-30 DIAGNOSIS — E87.5 HYPERKALEMIA: Primary | ICD-10-CM

## 2024-08-31 NOTE — TELEPHONE ENCOUNTER
Quentin Aguayo     Hope you are doing okay. I have heard your moved into the [facility] system    This is a gentleman seems to have persistent hyperkalemia intermittently.  He was not on any potassium supplements or Ace/ARB.  I had discontinued the Ace/ARB in past.    While I suspect he might have distal RTA and I am checking also on renin/aldosterone, I would like to have your input on managing his hyperkalemia especially if it requires long-term newer medications.    Would soda bicarb or low-dose of Lasix perhaps 2 or 3 times a week be helpful? .    Certainly I can send a formal referral for evaluation and follow-up consultation from you also.    Dr. Shannan GARCIA PT    (Type 4 RTA) is indeed an important consideration, especially in the context of diabetes, and should have been included in the initial differential diagnosis. Thank you for pointing that out. Let's integrate this consideration into the comprehensive evaluation and management plan for the patient.  Revised Evaluation and Management Plan Including Type 4 RTA  Potential Causes of Persistent Hyperkalemia:  Renal Impairment:  Diabetic Nephropathy: Chronic kidney disease (CKD) due to diabetic nephropathy.  Assessment: Check serum creatinine, estimated glomerular filtration rate (eGFR), and urine albumin-to-creatinine ratio (ACR).  Poor Diabetes Control:  Hyperglycemia: High blood glucose levels can lead to osmotic diuresis and intracellular potassium shifting to the extracellular space.  Assessment: Monitor blood glucose, HbA1c, and consider arterial blood gas (ABG) or venous blood gas (VBG) to evaluate for acidosis.  Distal Renal Tubular Acidosis (Type 4 RTA):  Mechanism: Often due to aldosterone deficiency or resistance, leading to impaired potassium and hydrogen ion excretion.  Clinical Features: Hyperkalemia, mild metabolic acidosis, normal anion gap.  Assessment: Serum electrolytes, ABG, urine pH, aldosterone and renin  levels.  Adrenal Insufficiency:  Greenbriers Disease: Though less common, adrenal insufficiency can cause hyperkalemia.  Assessment: Serum cortisol and adrenocorticotropic hormone (ACTH) levels.  Dietary Potassium Intake:  High-Potassium Foods: Excessive intake of potassium-rich foods.  Assessment: Detailed dietary history and dietary counseling to limit high-potassium foods.  Medication Review:  Other Medications: Comprehensive review of all current medications for potential hyperkalemia risks.  Pseudohyperkalemia:  Laboratory Error: Ensure proper blood sample handling to rule out pseudohyperkalemia.  Recommended Evaluation and Management:  Laboratory Tests:  Renal Function: Serum creatinine, eGFR, BUN, urine ACR.  Electrolytes: Repeat serum potassium, sodium, bicarbonate, chloride.  Diabetes Control: Blood glucose, HbA1c.  Acidosis: ABG or VBG to assess for metabolic acidosis.  Endocrine Function: Serum cortisol and ACTH levels if clinical suspicion for adrenal insufficiency.  Type 4 RTA Specific: Aldosterone and renin levels, urine pH.  Dietary and Medication Review:  Diet: Review and  on dietary potassium intake.  Medications: Comprehensive review of all current medications and supplements.  Hydration and Monitoring:  Hydration: Ensure adequate hydration as dehydration can exacerbate hyperkalemia.  Frequent Monitoring: Regular monitoring of potassium levels and renal function.  Management of Type 4 RTA:  Fludrocortisone: To increase potassium excretion.  Sodium Bicarbonate or Sodium Citrate: To correct metabolic acidosis.  Diuretics: Thiazide or loop diuretics to enhance potassium excretion.  Immediate Management of Severe Hyperkalemia:  Acute Treatment: For a potassium level of 6.5 mmol/L, immediate measures may be necessary:  Calcium Gluconate: To stabilize cardiac membranes.  Insulin and Glucose: To drive potassium into cells.  Sodium Bicarbonate: If metabolic acidosis is present.  Diuretics: Such as  furosemide to enhance renal potassium excretion.  Potassium Binders: Such as sodium polystyrene sulfonate or patiromer.  Conclusion  Incorporating the possibility of Type 4 RTA into the differential diagnosis is essential, especially given the patient's diabetic status and persistent hyperkalemia. A thorough evaluation and tailored management plan will help address the underlying causes and mitigate the risks associated with hyperkalemia.  Dr. Salazar Campbell MD Internal Medicine [address], [address] Phone: [phone] Fax: [phone]      For a straightforward and reliable lab workup to diagnose Distal Renal Tubular Acidosis (Type 4 RTA), the following tests are recommended:  Laboratory Workup for Type 4 RTA  Serum Electrolytes:  Potassium (K+): Typically elevated (hyperkalemia).  Sodium (Na+), Chloride (Cl-), and Bicarbonate (HCO3-): To assess for metabolic acidosis and electrolyte imbalances.  Anion Gap: Calculate to differentiate between normal and high anion gap metabolic acidosis. Type 4 RTA usually presents with a normal anion gap.  Arterial Blood Gas (ABG) or Venous Blood Gas (VBG):  pH: To confirm metabolic acidosis.  HCO3-: Typically low, indicating metabolic acidosis.  pCO2: To evaluate respiratory compensation.  Serum Aldosterone and Renin Levels:  Aldosterone: Often low in Type 4 RTA.  Renin: Can be low or normal, depending on the underlying cause (e.g., diabetic nephropathy often shows low renin).  Urine Electrolytes and pH:  Urine pH: Usually >5.5, indicating impaired acidification ability of the distal nephron.  Urine Potassium: To evaluate renal potassium handling.  Urine Sodium and Chloride: May help in assessing overall renal function.  Serum Cortisol and ACTH Levels (if adrenal insufficiency is suspected):  Cortisol: To rule out Silsbees disease or other forms of adrenal insufficiency.  ACTH: To differentiate between primary and secondary adrenal insufficiency.  Summary of  the Workup:  Serum Electrolytes:  Potassium (K+)  Sodium (Na+)  Chloride (Cl-)  Bicarbonate (HCO3-)  Anion Gap  Blood Gas Analysis:  Arterial Blood Gas (ABG) or Venous Blood Gas (VBG)  pH  HCO3-  pCO2  Serum Aldosterone and Renin Levels.  Urine Studies:  Urine pH  Urine Potassium  Urine Sodium and Chloride  Additional Tests (if needed):  Serum Cortisol  ACTH  Conclusion  This lab workup provides a comprehensive assessment to diagnose Type 4 RTA, especially focusing on the key features of hyperkalemia, metabolic acidosis, and impaired renal acidification. By combining these tests, you can effectively confirm the diagnosis and understand the underlying pathophysiology to guide appropriate management.  Dr. Salazar Campbell MD  Internal Medicine  [address] [place] [address], [address]  Phone: [phone]  Fax: [phone] jer[place]

## 2024-10-07 RX ORDER — DAPAGLIFLOZIN 10 MG/1
10 TABLET, FILM COATED ORAL EVERY MORNING
Qty: 30 TABLET | Refills: 5 | Status: SHIPPED | OUTPATIENT
Start: 2024-10-07

## 2024-11-08 ENCOUNTER — TELEPHONE (OUTPATIENT)
Dept: FAMILY MEDICINE | Facility: CLINIC | Age: 69
End: 2024-11-08
Payer: MEDICARE

## 2024-11-08 NOTE — TELEPHONE ENCOUNTER
"RE: patient's medical condition  Received: 1 month ago  Salazar Campbell MD sent to David Aguayo MD  Cc: Salazar Campbell MD   Thanks Nikolas.  Glad to reconnect with you.  How busy are you?          Previous Messages       ----- Message -----  From: David Aguayo MD  Sent: 9/9/2024  12:00 PM CDT  To: Salazar Campbell MD  Subject: RE: patient's medical condition                  Good afternoon! Yes I moved into the [facility] system over here in [place]. It has been much easier for me in terms of traveling time.  Hope you are well!    I do see he has had elevated potassium levels intermittently over the past few weeks/months.  I do not see any offending medications.  I agree, it would be interesting to see an aldosterone level on him.  Any suspicion for adrenal insufficiency otherwise? .    I see he may be a diabetic?  Is he a difficult stick for the phlebotomist in terms of pseudo hyperkalemia.    Sodium bicarbonate 650 mg twice a day is a reasonable thing to do in the interim.  As well as 20 mg Lasix Monday Wednesday Friday.  I have done both before in the past.    I would also check in with him to make sure he is not using "salt substitute".  Lot of the salt substitutes use potassium chloride instead of sodium chloride for seasoning.    Hope this helps!  Nikolas Aguayo  ----- Message -----  From: Salazar Campbell MD  Sent: 8/31/2024   2:34 PM CDT  To: David Aguayo MD  Subject: patient's medical condition                       Hello Dr. Aguayo    Hope you are doing okay. I have heard your moved into the [facility] system    This is a gentleman seems to have persistent hyperkalemia intermittently.  He was not on any potassium supplements or Ace/ARB.  I had discontinued the Ace/ARB in past.    While I suspect he might have distal RTA and I am checking also on renin/aldosterone, I would like to have your input on managing his hyperkalemia especially if it requires long-term newer medications.    Would soda " bicarb or low-dose of Lasix perhaps 2 or 3 times a week be helpful? .    Certainly I can send a formal referral for evaluation and follow-up consultation from you also.    Dr. Shannan JOHNSON

## 2024-11-26 ENCOUNTER — OFFICE VISIT (OUTPATIENT)
Dept: FAMILY MEDICINE | Facility: CLINIC | Age: 69
End: 2024-11-26
Payer: MEDICARE

## 2024-11-26 ENCOUNTER — PATIENT MESSAGE (OUTPATIENT)
Dept: FAMILY MEDICINE | Facility: CLINIC | Age: 69
End: 2024-11-26

## 2024-11-26 VITALS
WEIGHT: 179 LBS | BODY MASS INDEX: 27.13 KG/M2 | HEIGHT: 68 IN | HEART RATE: 96 BPM | SYSTOLIC BLOOD PRESSURE: 108 MMHG | DIASTOLIC BLOOD PRESSURE: 74 MMHG

## 2024-11-26 DIAGNOSIS — Z23 NEED FOR INFLUENZA VACCINATION: ICD-10-CM

## 2024-11-26 DIAGNOSIS — L40.9 PSORIASIS: ICD-10-CM

## 2024-11-26 DIAGNOSIS — E11.9 TYPE 2 DIABETES MELLITUS WITHOUT COMPLICATION, WITHOUT LONG-TERM CURRENT USE OF INSULIN: Primary | Chronic | ICD-10-CM

## 2024-11-26 DIAGNOSIS — N48.1 BALANITIS: ICD-10-CM

## 2024-11-26 DIAGNOSIS — E83.52 HYPERCALCEMIA: ICD-10-CM

## 2024-11-26 DIAGNOSIS — E87.5 HYPERKALEMIA: Chronic | ICD-10-CM

## 2024-11-26 DIAGNOSIS — I10 BENIGN ESSENTIAL HYPERTENSION: Chronic | ICD-10-CM

## 2024-11-26 DIAGNOSIS — E78.2 MIXED DYSLIPIDEMIA: Chronic | ICD-10-CM

## 2024-11-26 PROCEDURE — 1126F AMNT PAIN NOTED NONE PRSNT: CPT | Mod: CPTII,S$GLB,, | Performed by: INTERNAL MEDICINE

## 2024-11-26 PROCEDURE — 99999 PR PBB SHADOW E&M-EST. PATIENT-LVL III: CPT | Mod: PBBFAC,,, | Performed by: INTERNAL MEDICINE

## 2024-11-26 PROCEDURE — 3051F HG A1C>EQUAL 7.0%<8.0%: CPT | Mod: CPTII,S$GLB,, | Performed by: INTERNAL MEDICINE

## 2024-11-26 PROCEDURE — 90653 IIV ADJUVANT VACCINE IM: CPT | Mod: S$GLB,,, | Performed by: INTERNAL MEDICINE

## 2024-11-26 PROCEDURE — G0008 ADMIN INFLUENZA VIRUS VAC: HCPCS | Mod: S$GLB,,, | Performed by: INTERNAL MEDICINE

## 2024-11-26 PROCEDURE — 1101F PT FALLS ASSESS-DOCD LE1/YR: CPT | Mod: CPTII,S$GLB,, | Performed by: INTERNAL MEDICINE

## 2024-11-26 PROCEDURE — 3008F BODY MASS INDEX DOCD: CPT | Mod: CPTII,S$GLB,, | Performed by: INTERNAL MEDICINE

## 2024-11-26 PROCEDURE — 1160F RVW MEDS BY RX/DR IN RCRD: CPT | Mod: CPTII,S$GLB,, | Performed by: INTERNAL MEDICINE

## 2024-11-26 PROCEDURE — 3078F DIAST BP <80 MM HG: CPT | Mod: CPTII,S$GLB,, | Performed by: INTERNAL MEDICINE

## 2024-11-26 PROCEDURE — 3074F SYST BP LT 130 MM HG: CPT | Mod: CPTII,S$GLB,, | Performed by: INTERNAL MEDICINE

## 2024-11-26 PROCEDURE — 99213 OFFICE O/P EST LOW 20 MIN: CPT | Mod: S$GLB,,, | Performed by: INTERNAL MEDICINE

## 2024-11-26 PROCEDURE — 3288F FALL RISK ASSESSMENT DOCD: CPT | Mod: CPTII,S$GLB,, | Performed by: INTERNAL MEDICINE

## 2024-11-26 PROCEDURE — 1159F MED LIST DOCD IN RCRD: CPT | Mod: CPTII,S$GLB,, | Performed by: INTERNAL MEDICINE

## 2024-11-26 RX ORDER — NYSTATIN 100000 U/G
OINTMENT TOPICAL 2 TIMES DAILY
Qty: 15 G | Refills: 0 | Status: SHIPPED | OUTPATIENT
Start: 2024-11-26

## 2024-11-26 NOTE — PROGRESS NOTES
Subjective:       Patient ID: Mg Torre is a 69 y.o. male.    Chief Complaint: Hypertension, Diabetes, Labs Only, and genital infection    History of Present Illness    CHIEF COMPLAINT:  Mg presents with concerns about penile discomfort, redness, and a possible fungal infection.  This is a somewhat abbreviated visit given delay of patient's visit to the office.  Patient was offered visit on another day close by but he chose for an abbreviated visit today citing an important issue with genital infection.  HPI:  Mg reports redness and discomfort on his penis. The affected area was previously severely red and painful, making foreskin retraction difficult. He notes improvement, as he is now able to retract the foreskin, but the glans remains red. Mg expresses concern about the prolonged nature of the condition.    Mg and his caregiver have been managing the condition at home by applying baby ointment to the affected area. They also tried using Azo, which reportedly alleviated the burning sensation.    Mg has ongoing issues with high calcium and potassium levels, persisting despite dietary modifications. The caregiver reports closely monitoring the patient's diet, including reducing consumption of high-potassium foods such as apples, bananas, and oranges. They also discontinued Himalayan tea, a newly introduced item, in an attempt to address the high potassium levels.    Mg is scheduled to see Dr. Aguayo in January for follow-up on these ongoing issues.    Mg denies chest pain, tightness, or shortness of breath. He reports a runny nose but no cough or phlegm.    MEDICATIONS:  Mg is on Trulicity 4.5 mg, which is the maximum dose, for diabetes. He is also on Metformin for diabetes management. He is taking Farxiga for diabetes control and kidney protection.    MEDICAL HISTORY:  Mg has a history of diabetes and balanitis. He received a flu vaccine during the visit.    TEST  RESULTS:  Mg's calcium and potassium levels are high. An eye exam is due in January.      ROS:  Cardiovascular: -chest pain  Respiratory: -cough, -shortness of breath  Skin: -rash, -itching         Past Medical History:   Diagnosis Date    Allergy     pcn    Diabetes mellitus     Diabetes mellitus, type 2     Hyperlipidemia     Hypertension     Lab test positive for detection of COVID-19 virus 08/10/2021    Patient had tested himself at urgent care in [facility].  Minimally symptomatic at this point.  Continue with precautions.  Patient's son is positive with significant problems.    Screening for diabetic retinopathy 08/31/2023    Negative     Social History     Socioeconomic History    Marital status:      Spouse name: Sharyn Torre    Number of children: 2   Occupational History    Occupation:    Tobacco Use    Smoking status: Never    Smokeless tobacco: Never   Substance and Sexual Activity    Alcohol use: Yes    Drug use: No    Sexual activity: Yes     Partners: Female   Social History Narrative    Together 2 children- raised 6 total with Ms Coles     Social [employer]     Financial Resource Strain: Medium Risk (6/23/2022)    Overall Financial Resource Strain (CARDIA)     Difficulty of Paying Living Expenses: Somewhat hard   Food Insecurity: No Food Insecurity (6/23/2022)    Hunger Vital Sign     Worried About Running Out of Food in the Last Year: Never true     Ran Out of Food in the Last Year: Never true   Transportation Needs: No Transportation Needs (6/23/2022)    PRAPARE - Transportation     Lack of Transportation (Medical): No     Lack of Transportation (Non-Medical): No   Physical Activity: Inactive (6/23/2022)    Exercise Vital Sign     Days of Exercise per Week: 0 days     Minutes of Exercise per Session: 0 min   Stress: No Stress Concern Present (6/23/2022)    [employer] - Occupational Stress Questionnaire     Feeling of  "Stress : Only a little   Housing Stability: Low Risk  (6/23/2022)    Housing Stability Vital Sign     Unable to Pay for Housing in the Last Year: No     Number of Places Lived in the Last Year: 1     Unstable Housing in the Last Year: No     Past Surgical History:   Procedure Laterality Date    APPENDECTOMY      SPINE SURGERY       Family History   Problem Relation Name Age of Onset    Heart disease Father [place]     Obesity Son         Objective:      Physical Exam  Genitourinary:     Penis: Phimosis and erythema present. No tenderness or discharge.       Comments: Erythema in the glans penis and the prepuce      Blood pressure 108/74, pulse 96, height 5' 8" (1.727 m), weight 81.2 kg (179 lb). Body mass index is 27.22 kg/m².  Physical Exam    General: No acute distress. Well-developed. Well-nourished.  Eyes: EOMI. Sclerae anicteric.  HENT: Normocephalic. Atraumatic. Nares patent. Moist oral mucosa.  Ears: Bilateral TMs clear. Bilateral EACs clear.  Cardiovascular: Regular rate. Regular rhythm. No murmurs. No rubs. No gallops. Normal S1, S2.  Respiratory: Normal respiratory effort. Clear to auscultation bilaterally. No rales. No rhonchi. No wheezing.  Abdomen: Soft. Non-tender. Non-distended. Normoactive bowel sounds.  Musculoskeletal: No  obvious deformity.  Extremities: No lower extremity edema.  Neurological: Alert & oriented x3. No slurred speech. Normal gait.  Psychiatric: Normal mood. Normal affect. Good insight. Good judgment.  Skin: Warm. Dry. No rash.  Male Genitourinary: Penis skin discoloration.              Assessment:       Telephone on 08/30/2024   Component Date Value Ref Range Status    Glucose 11/22/2024 153 (H)  70 - 99 mg/dL Final    BUN 11/22/2024 21  8 - 27 mg/dL Final    Creatinine 11/22/2024 1.08  0.76 - 1.27 mg/dL Final    eGFR 11/22/2024 74  >59 mL/min/1.73 Final    BUN/Creatinine Ratio 11/22/2024 19  10 - 24 Final    Sodium 11/22/2024 135  134 - 144 mmol/L Final    Potassium " 11/22/2024 5.7 (H)  3.5 - 5.2 mmol/L Final    Chloride 11/22/2024 98  96 - 106 mmol/L Final    CO2 11/22/2024 24  20 - 29 mmol/L Final    Calcium 11/22/2024 10.3 (H)  8.6 - 10.2 mg/dL Final    Aldosterone 11/22/2024 WILL FOLLOW   Preliminary    Renin Activity,Plasma 11/22/2024 WILL FOLLOW   Preliminary    ALDOS/RENIN RATIO 11/22/2024 WILL FOLLOW   Preliminary    Hemoglobin A1c 11/22/2024 7.4 (H)  4.8 - 5.6 % Final    PSA - [facility] 11/22/2024 0.8  0.0 - 4.0 ng/mL Final       Assessment & Plan    Diagnosed balanitis (yeast infection of the penis)  Considered elevated potassium levels, but unable to determine cause despite dietary restrictions  Continued Farxiga despite potential side effects due to its kidney-protective properties  Decided against prescribing Lisinopril or Losartan due to risk of further elevating potassium levels  Assessed for cardiac symptoms and respiratory issues    N48.1 BALANITIS:  - Explained balanitis as a yeast infection of the penis, similar to vaginal yeast infections in women.  - Educated on the delicate nature of penile skin and mucous membranes.  - Started antifungal cream for balanitis: Apply 2 times daily for 5 days, pulling back foreskin and allowing absorption before replacing.  Nystatin cream.    E11.9 TYPE 2 DIABETES MELLITUS WITHOUT COMPLICATIONS:  - Continued Trulicity 4.5 mg (maximum dose) for diabetes management.  - Continued Metformin for diabetes management.  - Continued Farxiga.  - Ordered labs: vitamin D, intact PTH, and microalbumin.  - Referred to Dr. Gusman for diabetic eye exam before year-end.    E83.52 HYPERCALCEMIA:  - Follow up with Dr. Aguayo in January regarding persistently elevated calcium levels.    E87.5 HYPERKALEMIA:  - Follow up with Dr. Aguayo in January regarding persistently elevated potassium levels.    LIFESTYLE CHANGES:  - Discussed the importance of proper genital hygiene.  - Discussed the potential impact of dietary choices and flatulence on  genital health.  - Mg to pull back foreskin after urination to ensure complete drying.  - Mg to use paper towel or toilet paper to thoroughly dry penis after urination.  - Recommend keeping genital area dry, using a hair dryer if necessary (without direct contact).  - Mg to maintain proper hygiene, especially after bowel movements.  - Recommend using a moist towel for cleaning after defecation.  - Mg to get [facility] anytime from tomorrow onwards.         Plan:   Type 2 diabetes mellitus without complication, without long-term current use of insulin  Comments:  Current A1c of7.4 indicating only a modest can highest dose of Trulicity at 4.5.Also Farxiga at 10 mgm/metformin 1000 BID  Orders:  -     Microalbumin/Creatinine Ratio, Urine; Future; Expected date: 11/27/2024  -     Ambulatory referral/consult to Optometry; Future; Expected date: 12/03/2024    Benign essential hypertension  Comments:  Currently on amlodipine 5 mg, and off Ace or ARB because of hyperkalemia    Mixed dyslipidemia  Comments:  Cholesterol levels are controlled by atorvastatin 40 mg in morning    Hyperkalemia  Comments:  Persistent hyperkalemia.  Not on Ace or ARB.  Awaiting green in angiotensin and check with Nephrology    Hypercalcemia  Comments:  Check intact PTH, vitamin-D level and check with Nephrology.  Orders:  -     PTH, Intact; Future; Expected date: 11/27/2024  -     Vitamin D; Future; Expected date: 11/27/2024    Psoriasis    Need for influenza vaccination  -     influenza (adjuvanted) (Fluad) 45 mcg/0.5 mL IM vaccine (> or = 66 yo) 0.5 mL    Balanitis  -     nystatin (MYCOSTATIN) ointment; Apply topically 2 (two) times daily.  Dispense: 15 g; Refill: 0    Patient's medical conditions have been reviewed with at best modest control of diabetes on maximum medications.  Consideration for insulin which will be resisted for more tighter control.  Hyperkalemia and hypercalcemia has been noted.  Awaiting  renin/aldosterone activity  Check intact PTH and vitamin-D levels also.  Psoriasis control  Needs eye check up this year  Follow up in about 4 months (around 3/26/2025), or if symptoms worsen or fail to improve, for Diabetes/HTN/Lipids.      Current Outpatient Medications:     amLODIPine (NORVASC) 5 MG tablet, Take 1 tablet (5 mg total) by mouth once daily., Disp: 90 tablet, Rfl: 3    aspirin 81 MG Chew, 1 tablet once daily., Disp: , Rfl:     atorvastatin (LIPITOR) 40 MG tablet, TAKE 1 TABLET BY MOUTH EVERY MORNING, Disp: 90 tablet, Rfl: 3    cetirizine 10 mg Cap, Take 1 tablet by mouth once daily., Disp: , Rfl:     dulaglutide (TRULICITY) 4.5 mg/0.5 mL pen injector, INJECT 4.5mg INTO THE SKIN EVERY 7 DAYS, Disp: 4 Pen, Rfl: 11    FARXIGA 10 mg tablet, TAKE 1 TABLET BY MOUTH ONCE DAILY IN THE MORNING, Disp: 30 tablet, Rfl: 5    metFORMIN (GLUCOPHAGE) 500 MG tablet, TAKE TWO TABLETS BY MOUTH TWICE DAILY, Disp: 360 tablet, Rfl: 3    pantoprazole (PROTONIX) 40 MG tablet, TAKE 1 TABLET BY MOUTH ONCE DAILY BEFORE BREAKFAST. DO not crush, chew, OR split, Disp: , Rfl:     triamcinolone acetonide 0.1% (KENALOG) 0.1 % cream, AAA bid, Disp: 454 g, Rfl: 3    turmeric root extract 500 mg Cap, Take 1 tablet by mouth 2 (two) times daily., Disp: , Rfl:     vit C/E/Zn/coppr/lutein/zeaxan (PRESERVISION AREDS-2 ORAL), , Disp: , Rfl:     nystatin (MYCOSTATIN) ointment, Apply topically 2 (two) times daily., Disp: 15 g, Rfl: 0  No current facility-administered medications for this visit.    This note was generated with the assistance of ambient listening technology. Verbal consent was obtained by the patient and accompanying visitor(s) for the recording of patient appointment to facilitate this note. I attest to having reviewed and edited the generated note for accuracy, though some syntax or spelling errors may persist. Please contact the author of this note for any clarification.      Salazar Campbell

## 2024-12-03 ENCOUNTER — OFFICE VISIT (OUTPATIENT)
Dept: HOME HEALTH SERVICES | Facility: CLINIC | Age: 69
End: 2024-12-03
Payer: MEDICARE

## 2024-12-03 VITALS
SYSTOLIC BLOOD PRESSURE: 154 MMHG | DIASTOLIC BLOOD PRESSURE: 85 MMHG | WEIGHT: 179 LBS | BODY MASS INDEX: 27.13 KG/M2 | HEART RATE: 89 BPM | OXYGEN SATURATION: 98 % | HEIGHT: 68 IN

## 2024-12-03 DIAGNOSIS — Z00.00 ENCOUNTER FOR MEDICARE ANNUAL WELLNESS EXAM: Primary | ICD-10-CM

## 2024-12-03 DIAGNOSIS — L40.9 PSORIASIS: ICD-10-CM

## 2024-12-03 DIAGNOSIS — Z00.00 ENCOUNTER FOR PREVENTIVE HEALTH EXAMINATION: ICD-10-CM

## 2024-12-03 DIAGNOSIS — I10 ESSENTIAL HYPERTENSION: ICD-10-CM

## 2024-12-03 DIAGNOSIS — E11.22 TYPE 2 DIABETES MELLITUS WITH STAGE 3A CHRONIC KIDNEY DISEASE, WITHOUT LONG-TERM CURRENT USE OF INSULIN: ICD-10-CM

## 2024-12-03 DIAGNOSIS — E66.3 OVERWEIGHT WITH BODY MASS INDEX (BMI) OF 27 TO 27.9 IN ADULT: ICD-10-CM

## 2024-12-03 DIAGNOSIS — E78.2 MIXED DYSLIPIDEMIA: ICD-10-CM

## 2024-12-03 DIAGNOSIS — N18.31 TYPE 2 DIABETES MELLITUS WITH STAGE 3A CHRONIC KIDNEY DISEASE, WITHOUT LONG-TERM CURRENT USE OF INSULIN: ICD-10-CM

## 2024-12-03 PROCEDURE — 3077F SYST BP >= 140 MM HG: CPT | Mod: CPTII,S$GLB,, | Performed by: NURSE PRACTITIONER

## 2024-12-03 PROCEDURE — G0439 PPPS, SUBSEQ VISIT: HCPCS | Mod: S$GLB,,, | Performed by: NURSE PRACTITIONER

## 2024-12-03 PROCEDURE — 1101F PT FALLS ASSESS-DOCD LE1/YR: CPT | Mod: CPTII,S$GLB,, | Performed by: NURSE PRACTITIONER

## 2024-12-03 PROCEDURE — 1126F AMNT PAIN NOTED NONE PRSNT: CPT | Mod: CPTII,S$GLB,, | Performed by: NURSE PRACTITIONER

## 2024-12-03 PROCEDURE — 3079F DIAST BP 80-89 MM HG: CPT | Mod: CPTII,S$GLB,, | Performed by: NURSE PRACTITIONER

## 2024-12-03 PROCEDURE — 1159F MED LIST DOCD IN RCRD: CPT | Mod: CPTII,S$GLB,, | Performed by: NURSE PRACTITIONER

## 2024-12-03 PROCEDURE — 1158F ADVNC CARE PLAN TLK DOCD: CPT | Mod: CPTII,S$GLB,, | Performed by: NURSE PRACTITIONER

## 2024-12-03 PROCEDURE — 3051F HG A1C>EQUAL 7.0%<8.0%: CPT | Mod: CPTII,S$GLB,, | Performed by: NURSE PRACTITIONER

## 2024-12-03 PROCEDURE — 3288F FALL RISK ASSESSMENT DOCD: CPT | Mod: CPTII,S$GLB,, | Performed by: NURSE PRACTITIONER

## 2024-12-03 PROCEDURE — 1160F RVW MEDS BY RX/DR IN RCRD: CPT | Mod: CPTII,S$GLB,, | Performed by: NURSE PRACTITIONER

## 2024-12-09 ENCOUNTER — PATIENT MESSAGE (OUTPATIENT)
Dept: FAMILY MEDICINE | Facility: CLINIC | Age: 69
End: 2024-12-09
Payer: MEDICARE

## 2024-12-09 PROBLEM — E11.9 ENCOUNTER FOR DIABETIC FOOT EXAM: Status: RESOLVED | Noted: 2019-05-24 | Resolved: 2024-12-09

## 2024-12-09 PROBLEM — E66.9 OBESITY WITH BODY MASS INDEX 30 OR GREATER: Status: RESOLVED | Noted: 2017-08-18 | Resolved: 2024-12-09

## 2024-12-09 PROBLEM — E78.2 MULTIPLE-TYPE HYPERLIPIDEMIA: Status: RESOLVED | Noted: 2017-08-18 | Resolved: 2024-12-09

## 2024-12-09 PROBLEM — E66.3 OVERWEIGHT WITH BODY MASS INDEX (BMI) OF 27 TO 27.9 IN ADULT: Status: ACTIVE | Noted: 2024-12-09

## 2024-12-09 PROBLEM — Z23 INFLUENZA VACCINE ADMINISTERED: Status: RESOLVED | Noted: 2018-10-26 | Resolved: 2024-12-09

## 2024-12-09 PROBLEM — Z92.89 HISTORY OF EKG: Status: RESOLVED | Noted: 2017-09-20 | Resolved: 2024-12-09

## 2024-12-09 PROBLEM — U07.1 LAB TEST POSITIVE FOR DETECTION OF COVID-19 VIRUS: Status: RESOLVED | Noted: 2021-08-10 | Resolved: 2024-12-09

## 2024-12-09 NOTE — PATIENT INSTRUCTIONS
Counseling and Referral of Other Preventative  (Italic type indicates deductible and co-insurance are waived)    Patient Name: Mg Torre  Today's Date: 12/8/2024    Health Maintenance       Date Due Completion Date    RSV Vaccine (Age 60+ and Pregnant patients) (1 - Risk 60-74 years 1-dose series) Never done ---    Shingles Vaccine (3 of 3) 11/16/2023 9/21/2023    Eye Exam 08/31/2024 8/31/2023    Override on 7/13/2015: Done    COVID-19 Vaccine (4 - 2024-25 season) 09/01/2024 12/29/2021    Diabetes Urine Screening 11/02/2024 11/2/2023    Lipid Panel 03/08/2025 3/8/2024    Hemoglobin A1c 05/22/2025 11/22/2024    Foot Exam 07/23/2025 7/23/2024 (Done)    Override on 7/23/2024: Done    Override on 7/27/2023: Done    Override on 6/23/2022: Done    Override on 2/24/2022: Done    Override on 10/8/2021: Done    Override on 4/23/2021: Done    Override on 5/24/2019: Done    PROSTATE-SPECIFIC ANTIGEN 11/22/2025 11/22/2024    Low Dose Statin 11/26/2025 11/26/2024    Colorectal Cancer Screening 04/16/2029 4/16/2019    TETANUS VACCINE 04/26/2029 4/26/2019        No orders of the defined types were placed in this encounter.      The following information is provided to all patients.  This information is to help you find resources for any of the problems found today that may be affecting your health:                  Living healthy guide: www.[facility].[place].gov      Understanding Diabetes: www.diabetes.org      Eating healthy: www.cdc.gov/healthyweight      CDC home safety checklist: www.cdc.gov/steadi/patient.html      Agency on Aging: www.goea.[place].gov      Alcoholics anonymous (AA): www.aa.org      Physical Activity: www.brayan.nih.gov/yv3sioi      Tobacco use: www.quitwithusla.org

## 2024-12-09 NOTE — PROGRESS NOTES
"  Mg Nicho presented for an initial Medicare AWV today. The following components were reviewed and updated:    Medical history  Family History  Social history  Allergies and Current Medications  Health Risk Assessment  Health Maintenance  Care Team    **See Completed Assessments for Annual Wellness visit with in the encounter summary    The following assessments were completed:  Depression Screening  Cognitive function Screening  Timed Get Up Test  Whisper Test      Opioid documentation:      Patient does not have a current opioid prescription.          Vitals:    12/03/24 1003 12/03/24 1013   BP: (!) 156/85 (!) 154/85   Patient Position: Sitting    Pulse: 89    SpO2: 98%    Weight: 81.2 kg (179 lb)    Height: 5' 8" (1.727 m)      Body mass index is 27.22 kg/m².       Physical Exam  Constitutional:       Appearance: Normal appearance.   HENT:      Head: Normocephalic and atraumatic.      Nose: Nose normal.      Mouth/Throat:      Mouth: Mucous membranes are moist.   Eyes:      Extraocular Movements: Extraocular movements intact.      Pupils: Pupils are equal, round, and reactive to light.   Cardiovascular:      Rate and Rhythm: Normal rate and regular rhythm.   Pulmonary:      Effort: Pulmonary effort is normal.      Breath sounds: Normal breath sounds.   Abdominal:      General: Bowel sounds are normal.      Palpations: Abdomen is soft.   Musculoskeletal:         General: Normal range of motion.      Cervical back: Normal range of motion and neck supple.   Skin:     General: Skin is warm and dry.   Neurological:      General: No focal deficit present.      Mental Status: He is alert and oriented to person, place, and time.   Psychiatric:         Mood and Affect: Mood normal.         Behavior: Behavior normal.           Diagnoses and health risks identified today and associated recommendations/orders:  1. Encounter for Medicare annual wellness exam  - Ambulatory Referral/Consult to Enhanced Annual Wellness " Visit (eAWV)    2. Encounter for preventive health examination  Awv completed      3. Mixed dyslipidemia  On statin      4. Essential hypertension  On meds- BP high today - monitor at home and report to PCP    5. Type 2 diabetes mellitus with stage 3a chronic kidney disease, without long-term current use of insulin  On meds - stable      6. Psoriasis  Topical meds - PRN  Was doing well on injectables but cost increased        Provided Mg with a 5-10 year written screening schedule and personal prevention plan. Recommendations were developed using the USPSTF age appropriate recommendations. Education, counseling, and referrals were provided as needed.  After Visit Summary printed and given to patient which includes a list of additional screenings\tests needed.    Fu in 1 yr for awv            Kayley Dale, LARISA, APRN

## 2024-12-20 LAB
LEFT EYE DM RETINOPATHY: NEGATIVE
RIGHT EYE DM RETINOPATHY: NEGATIVE

## 2024-12-26 ENCOUNTER — HOSPITAL ENCOUNTER (OUTPATIENT)
Dept: PREADMISSION TESTING | Facility: HOSPITAL | Age: 69
Discharge: HOME | End: 2024-12-26
Attending: INTERNAL MEDICINE
Payer: MEDICARE

## 2024-12-26 VITALS
BODY MASS INDEX: 27.13 KG/M2 | WEIGHT: 179 LBS | SYSTOLIC BLOOD PRESSURE: 152 MMHG | OXYGEN SATURATION: 98 % | HEART RATE: 86 BPM | RESPIRATION RATE: 16 BRPM | DIASTOLIC BLOOD PRESSURE: 85 MMHG | HEIGHT: 68 IN | TEMPERATURE: 99 F

## 2024-12-26 DIAGNOSIS — Z01.818 PRE-OP EXAM: Primary | ICD-10-CM

## 2024-12-26 LAB
ANION GAP SERPL CALC-SCNC: 7 MMOL/L (ref 8–16)
BUN SERPL-MCNC: 17 MG/DL (ref 8–23)
CALCIUM SERPL-MCNC: 10.2 MG/DL (ref 8.7–10.5)
CHLORIDE SERPL-SCNC: 101 MMOL/L (ref 95–110)
CO2 SERPL-SCNC: 31 MMOL/L (ref 23–29)
CREAT SERPL-MCNC: 1 MG/DL (ref 0.5–1.4)
ERYTHROCYTE [DISTWIDTH] IN BLOOD BY AUTOMATED COUNT: 13.3 % (ref 11.5–14.5)
EST. GFR  (NO RACE VARIABLE): >60 ML/MIN/1.73 M^2
GLUCOSE SERPL-MCNC: 157 MG/DL (ref 70–110)
HCT VFR BLD AUTO: 44.5 % (ref 40–54)
HGB BLD-MCNC: 14.7 G/DL (ref 14–18)
MCH RBC QN AUTO: 27.7 PG (ref 27–31)
MCHC RBC AUTO-ENTMCNC: 33 G/DL (ref 32–36)
MCV RBC AUTO: 84 FL (ref 82–98)
OHS QRS DURATION: 80 MS
OHS QTC CALCULATION: 432 MS
PLATELET # BLD AUTO: 276 K/UL (ref 150–450)
PMV BLD AUTO: 10.7 FL (ref 9.2–12.9)
POTASSIUM SERPL-SCNC: 5.2 MMOL/L (ref 3.5–5.1)
RBC # BLD AUTO: 5.3 M/UL (ref 4.6–6.2)
SODIUM SERPL-SCNC: 139 MMOL/L (ref 136–145)
WBC # BLD AUTO: 7.07 K/UL (ref 3.9–12.7)

## 2024-12-26 PROCEDURE — 85027 COMPLETE CBC AUTOMATED: CPT | Performed by: ANESTHESIOLOGY

## 2024-12-26 PROCEDURE — 80048 BASIC METABOLIC PNL TOTAL CA: CPT | Performed by: ANESTHESIOLOGY

## 2024-12-26 NOTE — DISCHARGE INSTRUCTIONS
A NURSE FROM THE ENDOSCOPY DEPARTMENT WILL CALL YOU THE DAY BEFORE YOUR PROCEDURE AND LET YOUR KNOW YOUR ARRIVAL TIME.     PARK IN THE PARKING GARAGE AND CHECK IN AT REGISTRATION THEN PROCEED TO THE SAME DAY SURGERY DEPARTMENT ON THE FIRST FLOOR.    DO NOT EAT OR DRINK AFTER MIDNIGHT EXCEPT A SMALL SIP OF WATER WITH MEDICATIONS    IF YOU ARE HAVING A COLONOSCOPY BE SURE TO FOLLOW YOUR COLON PREP FROM YOUR DOCTORS OFFICE.    HOLD ALL ASPIRIN CONTAINING PRODUCTS 7 DAYS PRIOR TO YOUR PROCEDURE UNLESS YOUR DOCTOR INSTRUCTS YOU OTHERWISE.     YOU MAY TAKE ACETAMINOPHEN IF NEEDED FOR PAIN.      DO NOT WEAR ANY JEWELRY, LEAVE ALL VALUABLES AT HOME.     IF YOU WEAR GLASSES, HEARING AIDES OR DENTURES PLEASE BRING A CASE FOR THEM.    WEAR LOOSE COMFORTABLE CLOTHES TO GO HOME IN.    BE SURE TO HAVE SOMEONE HERE TO DRIVE YOU HOME AFTER YOUR PROCEDURE.     IF YOU HAVE ANY QUESTIONS REGARDING YOUR INSTRUCTIONS YOU CAN CALL OUR ENDOSCOPY DEPARTMENT@ [phone]

## 2024-12-27 ENCOUNTER — HOSPITAL ENCOUNTER (OUTPATIENT)
Facility: HOSPITAL | Age: 69
Discharge: HOME | End: 2024-12-27
Attending: INTERNAL MEDICINE | Admitting: INTERNAL MEDICINE
Payer: MEDICARE

## 2024-12-27 ENCOUNTER — ANESTHESIA EVENT (OUTPATIENT)
Dept: SURGERY | Facility: HOSPITAL | Age: 69
End: 2024-12-27
Payer: MEDICARE

## 2024-12-27 ENCOUNTER — ANESTHESIA (OUTPATIENT)
Dept: SURGERY | Facility: HOSPITAL | Age: 69
End: 2024-12-27
Payer: MEDICARE

## 2024-12-27 ENCOUNTER — PATIENT OUTREACH (OUTPATIENT)
Dept: ADMINISTRATIVE | Facility: HOSPITAL | Age: 69
End: 2024-12-27
Payer: MEDICARE

## 2024-12-27 VITALS
DIASTOLIC BLOOD PRESSURE: 73 MMHG | SYSTOLIC BLOOD PRESSURE: 142 MMHG | HEART RATE: 84 BPM | TEMPERATURE: 99 F | OXYGEN SATURATION: 98 % | RESPIRATION RATE: 18 BRPM

## 2024-12-27 VITALS
OXYGEN SATURATION: 99 % | SYSTOLIC BLOOD PRESSURE: 119 MMHG | DIASTOLIC BLOOD PRESSURE: 68 MMHG | RESPIRATION RATE: 16 BRPM | HEART RATE: 86 BPM

## 2024-12-27 DIAGNOSIS — R69 DIAGNOSIS UNKNOWN: ICD-10-CM

## 2024-12-27 PROCEDURE — 25000003 PHARM REV CODE 250: Performed by: INTERNAL MEDICINE

## 2024-12-27 PROCEDURE — G0105 COLORECTAL SCRN; HI RISK IND: HCPCS | Performed by: INTERNAL MEDICINE

## 2024-12-27 PROCEDURE — 25000003 PHARM REV CODE 250: Performed by: NURSE ANESTHETIST, CERTIFIED REGISTERED

## 2024-12-27 PROCEDURE — 63600175 PHARM REV CODE 636 W HCPCS: Performed by: NURSE ANESTHETIST, CERTIFIED REGISTERED

## 2024-12-27 PROCEDURE — 37000008 HC ANESTHESIA 1ST 15 MINUTES: Performed by: INTERNAL MEDICINE

## 2024-12-27 PROCEDURE — 37000009 HC ANESTHESIA EA ADD 15 MINS: Performed by: INTERNAL MEDICINE

## 2024-12-27 RX ORDER — PROPOFOL 10 MG/ML
VIAL (ML) INTRAVENOUS
Status: DISCONTINUED | OUTPATIENT
Start: 2024-12-27 | End: 2024-12-27

## 2024-12-27 RX ORDER — SODIUM CHLORIDE 0.9 % (FLUSH) 0.9 %
10 SYRINGE (ML) INJECTION
Status: DISCONTINUED | OUTPATIENT
Start: 2024-12-27 | End: 2024-12-27 | Stop reason: HOSPADM

## 2024-12-27 RX ADMIN — PROPOFOL 50 MG: 10 INJECTION, EMULSION INTRAVENOUS at 07:12

## 2024-12-27 RX ADMIN — SODIUM CHLORIDE: 0.9 INJECTION, SOLUTION INTRAVENOUS at 07:12

## 2024-12-27 NOTE — TRANSFER OF CARE
Anesthesia Transfer of Care Note    Patient: Mg Torre    Procedure(s) Performed: Procedure(s) (LRB):  COLONOSCOPY (N/A)    Patient location: GI    Anesthesia Type: general    Transport from OR: Transported from OR on room air with adequate spontaneous ventilation    Post pain: adequate analgesia    Post assessment: no apparent anesthetic complications and tolerated procedure well    Post vital signs: stable    Level of consciousness: awake    Nausea/Vomiting: no nausea/vomiting    Complications: none    Transfer of care protocol was followed      Last vitals: Visit Vitals  BP (!) 169/77   Pulse 89   Temp 37.1 °C (98.7 °F)   Resp 18   SpO2 98%

## 2024-12-27 NOTE — ANESTHESIA PREPROCEDURE EVALUATION
12/27/2024  Mg Torre is a 69 y.o., male.      Patient Active Problem List   Diagnosis    Essential hypertension    Well controlled diabetes mellitus    Non-smoker    Psoriasis    Hyperkalemia    Type 2 diabetes mellitus with stage 3a chronic kidney disease, without long-term current use of insulin    Mixed dyslipidemia    Adverse reaction to angiotensin 2 receptor antagonist    Hypercalcemia    Overweight with body mass index (BMI) of 27 to 27.9 in adult       Past Surgical History:   Procedure Laterality Date    APPENDECTOMY      HIP ARTHROPLASTY Right         Tobacco Use:  The patient  reports that he has never smoked. He has never used smokeless tobacco.     Results for orders placed or performed during the hospital encounter of 12/26/24   EKG 12-lead    Collection Time: 12/26/24  6:54 AM   Result Value Ref Range    QRS Duration 80 ms    OHS QTC Calculation 432 ms    Narrative    Test Reason : Z01.818,    Vent. Rate :  82 BPM     Atrial Rate :  82 BPM     P-R Int : 158 ms          QRS Dur :  80 ms      QT Int : 370 ms       P-R-T Axes :  53  31  57 degrees    QTcB Int : 432 ms    Normal sinus rhythm  Normal ECG  No previous ECGs available    Referred By:            Confirmed By:              Lab Results   Component Value Date    WBC 7.07 12/26/2024    HGB 14.7 12/26/2024    HCT 44.5 12/26/2024    MCV 84 12/26/2024     12/26/2024     BMP  Lab Results   Component Value Date     12/26/2024    K 5.2 (H) 12/26/2024     12/26/2024    CO2 31 (H) 12/26/2024    BUN 17 12/26/2024    CREATININE 1.0 12/26/2024    CALCIUM 10.2 12/26/2024    ANIONGAP 7 (L) 12/26/2024     (H) 12/26/2024     (H) 11/22/2024     (H) 08/09/2024       No results found for this or any previous visit.         Pre-op Assessment    I have reviewed the Patient Summary Reports.     I have reviewed  the Nursing Notes. I have reviewed the NPO Status.   I have reviewed the Medications.     Review of Systems  Anesthesia Hx:  No problems with previous Anesthesia   Neg history of prior surgery.          Denies Family Hx of Anesthesia complications.    Denies Personal Hx of Anesthesia complications.                    Social:  Non-Smoker, Alcohol Use       Hematology/Oncology:  Hematology Normal   Oncology Normal                                   EENT/Dental:  EENT/Dental Normal           Cardiovascular:     Hypertension                                    Hypertension         Pulmonary:  Pulmonary Normal                       Renal/:  Chronic Renal Disease        Kidney Function/Disease             Hepatic/GI:  Hepatic/GI Normal                    Musculoskeletal:  Musculoskeletal Normal                Neurological:  Neurology Normal                                      Endocrine:  Diabetes, type 2    Diabetes                      Dermatological:  Skin Normal    Psych:  Psychiatric Normal                    Physical Exam  General: Well nourished and Alert    Airway:  Mallampati: II   Mouth Opening: Normal  TM Distance: Normal  Tongue: Normal  Neck ROM: Normal ROM    Dental:  Intact    Chest/Lungs:  Clear to auscultation, Normal Respiratory Rate    Heart:  Rate: Normal  Rhythm: Regular Rhythm  Sounds: Normal        Anesthesia Plan  Type of Anesthesia, risks & benefits discussed:    Anesthesia Type: Gen Natural Airway  Intra-op Monitoring Plan: Standard ASA Monitors  Post Op Pain Control Plan:   (medical reason for not using multimodal pain management)  Induction:  IV  Informed Consent: Informed consent signed with the Patient and all parties understand the risks and agree with anesthesia plan.  All questions answered. Patient consented to blood products? Yes  ASA Score: 3    Ready For Surgery From Anesthesia Perspective.     .

## 2024-12-27 NOTE — ANESTHESIA POSTPROCEDURE EVALUATION
Anesthesia Post Evaluation    Patient: Mg Torre    Procedure(s) Performed: Procedure(s) (LRB):  COLONOSCOPY (N/A)    Final Anesthesia Type: general      Patient location during evaluation: GI PACU  Patient participation: Yes- Able to Participate  Level of consciousness: awake and alert and oriented  Post-procedure vital signs: reviewed and stable  Pain management: adequate  Airway patency: patent    PONV status at discharge: No PONV  Anesthetic complications: no      Cardiovascular status: blood pressure returned to baseline, hemodynamically stable and stable  Respiratory status: unassisted, spontaneous ventilation and room air  Hydration status: euvolemic  Follow-up not needed.              Vitals Value Taken Time   /88 12/27/24 0841   Temp 36.7 C 12/27/24 0904   Pulse 83 12/27/24 0857   Resp 16 12/27/24 0904   SpO2 95 % 12/27/24 0857   Vitals shown include unfiled device data.      Event Time   Out of Recovery 08:25:17         Pain/Jihan Score: No data recorded

## 2024-12-27 NOTE — [employer] PATIENT INSTRUCTIONS
Discharge Summary/Instructions after an Endoscopic Procedure  Patient Name: Mg Torre  Patient MRN: [id number]  Patient YOB: 1955  Friday, December 27, 2024  Frank Chan MD  RESTRICTIONS:  During your procedure today, you received medications for sedation.  These   medications may affect your judgment, balance and coordination.  Therefore,   for 24 hours, you have the following restrictions:   - DO NOT drive a car, operate machinery, make legal/financial decisions,   sign important papers or drink alcohol.    ACTIVITY:  Today: no heavy lifting, straining or running due to procedural   sedation/anesthesia.  The following day: return to full activity including work.  DIET:  Eat and drink normally unless instructed otherwise.     TREATMENT FOR COMMON SIDE EFFECTS:  - Mild abdominal pain, nausea, belching, bloating or excessive gas:  rest,   eat lightly and use a heating pad.  - Sore Throat: treat with throat lozenges and/or gargle with warm salt   water.  - Because air was used during the procedure, expelling large amounts of air   from your rectum or belching is normal.  - If a bowel prep was taken, you may not have a bowel movement for 1-3 days.    This is normal.  SYMPTOMS TO WATCH FOR AND REPORT TO YOUR PHYSICIAN:  1. Abdominal pain or bloating, other than gas cramps.  2. Chest pain.  3. Back pain.  4. Signs of infection such as: chills or fever occurring within 24 hours   after the procedure.  5. Rectal bleeding, which would show as bright red, maroon, or black stools.   (A tablespoon of blood from the rectum is not serious, especially if   hemorrhoids are present.)  6. Vomiting.  7. Weakness or dizziness.  GO DIRECTLY TO THE NEAREST EMERGENCY ROOM IF YOU HAVE ANY OF THE FOLLOWING:      Difficulty breathing              Chills and/or fever over 101 F   Persistent vomiting and/or vomiting blood   Severe abdominal pain   Severe chest pain   Black, tarry stools   Bleeding- more than one  tablespoon   Any other symptom or condition that you feel may need urgent attention  Your doctor recommends these additional instructions:  If any biopsies were taken, your doctors clinic will contact you in 1 to 2   weeks with any results.  - Patient has a contact number available for emergencies.  The signs and   symptoms of potential delayed complications were discussed with the   patient.  Return to normal activities tomorrow.  Written discharge   instructions were provided to the patient.   - Repeat colonoscopy in 5 years for surveillance.   - Resume previous diet.   - Discharge patient to home (with escort).   - Continue present medications.  For questions, problems or results please call your physician - Frank Chan MD at Work:  [phone].  [facility], EMERGENCY ROOM PHONE NUMBER: [phone]  IF A COMPLICATION OR EMERGENCY SITUATION ARISES AND YOU ARE UNABLE TO REACH   YOUR PHYSICIAN - GO DIRECTLY TO THE EMERGENCY ROOM.  MD Frank Tsang MD  12/27/2024 10:12:48 AM  This report has been verified and signed electronically.  Dear patient,  As a result of recent federal legislation (The Federal Cures Act), you may   receive lab or pathology results from your procedure in your [facility]   account before your physician is able to contact you. Your physician or   their representative will relay the results to you with their   recommendations at their soonest availability.  Thank you,  [employer]

## 2024-12-27 NOTE — H&P
GASTROENTEROLOGY PRE-PROCEDURE H&P NOTE  Patient Name: Mg Torre  Patient MRN: [id number]  Patient : 1955    Service date: 2024    PCP: Salazar Campbell MD    No chief complaint on file.      HPI: Patient is a 69 y.o. male with PMHx as below here for evaluation of for cscope given h/o polyps 2019    Past Medical History:  Past Medical History:   Diagnosis Date    Allergy     pcn    Diabetes mellitus     Diabetes mellitus, type 2     Hyperkalemia     Hyperlipidemia     Hypertension     Lab test positive for detection of COVID-19 virus 08/10/2021    Patient had tested himself at urgent care in [facility].  Minimally symptomatic at this point.  Continue with precautions.  Patient's son is positive with significant problems.    Screening for diabetic retinopathy 2023    Negative        Past Surgical History:  Past Surgical History:   Procedure Laterality Date    APPENDECTOMY      HIP ARTHROPLASTY Right         Home Medications:  Medications Prior to Admission   Medication Sig Dispense Refill Last Dose/Taking    amLODIPine (NORVASC) 5 MG tablet Take 1 tablet (5 mg total) by mouth once daily. 90 tablet 3     aspirin 81 MG Chew Take 1 tablet by mouth once daily.       cetirizine 10 mg Cap Take 1 tablet by mouth once daily.       dulaglutide (TRULICITY) 4.5 mg/0.5 mL pen injector INJECT 4.5mg INTO THE SKIN EVERY 7 DAYS (Patient taking differently: Inject 4.5 mg into the skin every 7 days. Last injection on  holding until after procedure on ) 4 Pen 11     FARXIGA 10 mg tablet TAKE 1 TABLET BY MOUTH ONCE DAILY IN THE MORNING 30 tablet 5     metFORMIN (GLUCOPHAGE) 500 MG tablet TAKE TWO TABLETS BY MOUTH TWICE DAILY 360 tablet 3     nystatin (MYCOSTATIN) ointment Apply topically 2 (two) times daily. 15 g 0     pantoprazole (PROTONIX) 40 MG tablet TAKE 1 TABLET BY MOUTH ONCE DAILY BEFORE BREAKFAST. DO not crush, chew, OR split (Patient taking differently: Take 40 mg by mouth daily  as needed (reflux).)       triamcinolone acetonide 0.1% (KENALOG) 0.1 % cream AAA bid 454 g 3     turmeric root extract 500 mg Cap Take 1 tablet by mouth 2 (two) times daily.       vit C/E/Zn/coppr/lutein/zeaxan (PRESERVISION AREDS-2 ORAL)                 Current Facility-Administered Medications:     sodium chloride 0.9%, 10 mL, Intravenous, PRN    Review of patient's allergies indicates:   Allergen Reactions    Losartan Other (See Comments)     Hyperkalemia in past and elevated cr  Hyperkalemia in past and elevated cr  Hyperkalemia in past and elevated cr    Penicillins        Social History:   Social History     Occupational History    Occupation:    Tobacco Use    Smoking status: Never    Smokeless tobacco: Never   Substance and Sexual Activity    Alcohol use: Yes     Comment: rarely    Drug use: No    Sexual activity: Yes     Partners: Female       Family History:   Family History   Problem Relation Name Age of Onset    Heart disease Father [place] Nicho     Obesity Son         Review of Systems:  A 10 point review of systems was performed and was normal, except as mentioned in the HPI, including constitutional, HEENT, heme, lymph, cardiovascular, respiratory, gastrointestinal, genitourinary, neurologic, endocrine, psychiatric and musculoskeletal.      OBJECTIVE:    Physical Exam:  24 Hour Vital Sign Ranges: Temp:  [98.7 °F (37.1 °C)] 98.7 °F (37.1 °C)  Pulse:  [89] 89  Resp:  [18] 18  SpO2:  [98 %] 98 %  BP: (169)/(77) 169/77  Most recent vitals: BP (!) 169/77   Pulse 89   Temp 98.7 °F (37.1 °C)   Resp 18   SpO2 98%    GEN: well-developed, well-nourished, awake and alert, non-toxic appearing adult  HEENT: PERRL, sclera anicteric, oral mucosa pink and moist without lesion  NECK: trachea midline; Good ROM  CV: regular rate and rhythm, no murmurs or gallops  RESP: clear to auscultation bilaterally, no wheezes, rhonci or rales  ABD: soft, non-tender, non-distended, normal bowel  "sounds  EXT: no swelling or edema, 2+ pulses distally  SKIN: no rashes or jaundice  PSYCH: normal affect    Labs:   Recent Labs     12/26/24 0727   WBC 7.07   MCV 84        Recent Labs     12/26/24 0727      K 5.2*      CO2 31*   BUN 17   *     No results for input(s): "ALB" in the last 72 hours.    Invalid input(s): "ALKP", "SGOT", "SGPT", "TBIL", "DBIL", "TPRO"  No results for input(s): "PT", "INR", "PTT" in the last 72 hours.      IMPRESSION / RECOMMENDATIONS:  H/o polyps  -cscope  with interventions as warranted.   RIsks, benefits, alternatives discussed in detail regarding upcoming procedures and sedation. Some of the more common endoscopic complications include but not limited to immediate or delayed perforation, bleeding, infections, pain, inadvertent injury to surrounding tissue / organs and possible need for surgical evaluation. Patient expressed understanding, all questions answered and will proceed with procedure as planned.     Frank Chan  12/27/2024  7:23 AM      "

## 2025-01-08 ENCOUNTER — OFFICE VISIT (OUTPATIENT)
Dept: NEPHROLOGY | Facility: CLINIC | Age: 70
End: 2025-01-08
Payer: MEDICARE

## 2025-01-08 VITALS
SYSTOLIC BLOOD PRESSURE: 126 MMHG | OXYGEN SATURATION: 99 % | BODY MASS INDEX: 27.22 KG/M2 | HEART RATE: 87 BPM | DIASTOLIC BLOOD PRESSURE: 70 MMHG | HEIGHT: 68 IN

## 2025-01-08 DIAGNOSIS — R79.89 ABNORMAL SERUM CREATININE LEVEL: ICD-10-CM

## 2025-01-08 DIAGNOSIS — I10 ESSENTIAL HYPERTENSION: ICD-10-CM

## 2025-01-08 DIAGNOSIS — E11.9 WELL CONTROLLED DIABETES MELLITUS: ICD-10-CM

## 2025-01-08 DIAGNOSIS — E87.5 HYPERKALEMIA: ICD-10-CM

## 2025-01-08 DIAGNOSIS — E78.2 MIXED DYSLIPIDEMIA: ICD-10-CM

## 2025-01-08 DIAGNOSIS — N25.89 PSEUDOHYPOALDOSTERONISM, TYPE 2: Primary | ICD-10-CM

## 2025-01-08 DIAGNOSIS — E11.22 TYPE 2 DIABETES MELLITUS WITH STAGE 2 CHRONIC KIDNEY DISEASE, WITHOUT LONG-TERM CURRENT USE OF INSULIN: ICD-10-CM

## 2025-01-08 DIAGNOSIS — N18.2 TYPE 2 DIABETES MELLITUS WITH STAGE 2 CHRONIC KIDNEY DISEASE, WITHOUT LONG-TERM CURRENT USE OF INSULIN: ICD-10-CM

## 2025-01-08 DIAGNOSIS — Z78.9 NON-SMOKER: ICD-10-CM

## 2025-01-08 PROCEDURE — 99999 PR PBB SHADOW E&M-EST. PATIENT-LVL III: CPT | Mod: PBBFAC,,, | Performed by: STUDENT IN AN ORGANIZED HEALTH CARE EDUCATION/TRAINING PROGRAM

## 2025-01-08 PROCEDURE — 3066F NEPHROPATHY DOC TX: CPT | Mod: CPTII,S$GLB,, | Performed by: STUDENT IN AN ORGANIZED HEALTH CARE EDUCATION/TRAINING PROGRAM

## 2025-01-08 PROCEDURE — 1159F MED LIST DOCD IN RCRD: CPT | Mod: CPTII,S$GLB,, | Performed by: STUDENT IN AN ORGANIZED HEALTH CARE EDUCATION/TRAINING PROGRAM

## 2025-01-08 PROCEDURE — 3074F SYST BP LT 130 MM HG: CPT | Mod: CPTII,S$GLB,, | Performed by: STUDENT IN AN ORGANIZED HEALTH CARE EDUCATION/TRAINING PROGRAM

## 2025-01-08 PROCEDURE — 99204 OFFICE O/P NEW MOD 45 MIN: CPT | Mod: S$GLB,,, | Performed by: STUDENT IN AN ORGANIZED HEALTH CARE EDUCATION/TRAINING PROGRAM

## 2025-01-08 PROCEDURE — 3078F DIAST BP <80 MM HG: CPT | Mod: CPTII,S$GLB,, | Performed by: STUDENT IN AN ORGANIZED HEALTH CARE EDUCATION/TRAINING PROGRAM

## 2025-01-08 PROCEDURE — 3008F BODY MASS INDEX DOCD: CPT | Mod: CPTII,S$GLB,, | Performed by: STUDENT IN AN ORGANIZED HEALTH CARE EDUCATION/TRAINING PROGRAM

## 2025-01-08 RX ORDER — INDAPAMIDE 2.5 MG/1
2.5 TABLET ORAL DAILY
Qty: 30 TABLET | Refills: 11 | Status: SHIPPED | OUTPATIENT
Start: 2025-01-08 | End: 2026-01-08

## 2025-01-08 RX ORDER — OMEPRAZOLE 20 MG/1
20 CAPSULE, DELAYED RELEASE ORAL DAILY
COMMUNITY

## 2025-01-08 NOTE — PROGRESS NOTES
[facility]  Nephrology Clinic      Subjective:       HPI ID: Mg Torre is a 69 y.o. male who presents for new patient evaluation for hyperkalemia.    Mg Torre is referred by Salazar Campbell MD to be evaluated for hyperkalemia.  He has ongoing conditions of hypertension, hyperlipidemia, diabetes mellitus type 2.  We reviewed his recent chemistry panels at chair side.  Last chemistry panel was from 12/26/2024 featuring a serum creatinine creatinine of 1.0 mg/dL which appears consistent with his trends over the last year, and potassium 5.2 mEq per L. his serum potassium over the last 3 years ranges from 4.3-6.2 mEq per L with most values in the 5s.    In terms of his renal history, he denies hospitalizations for prior MALINDA or MALINDA requiring RRT. Denies history of nephrolithiasis or hematuria episodes. No reported history of frequent or recurrent use of NSAIDs/COXI. No pertinent rheumatologic or AI disease history. Denies any pertinent family history of known kidney disease, or family members diagnosed with ESRD requiring dialysis.  Smoking Hx: denies  Other pertinent urologic history: denies  Fluid intake per 24hr: 40 oz per day.     He has no uremic or urinary symptoms and is in his usual state of health.      During this visit, the patient and I reviewed his lab trends, discussed CKD epidemiology and risk factors, as well as general lifestyle and risk factor modifications to reduce his risk of progression to ESRD.       Review of Systems   Constitutional:  Negative for chills, diaphoresis and fever.   Respiratory:  Negative for cough and shortness of breath.    Cardiovascular:  Negative for chest pain and leg swelling.   Gastrointestinal:  Negative for abdominal pain, diarrhea, nausea and vomiting.   Genitourinary:  Negative for difficulty urinating, dysuria and hematuria.   Musculoskeletal:  Negative for myalgias.   Neurological:  Negative for headaches.   Hematological:  Does  not bruise/bleed easily.       The past medical, family and social histories were reviewed for this encounter.     Past Medical History:   Diagnosis Date    Allergy     pcn    Diabetes mellitus     Diabetes mellitus, type 2     Hyperkalemia     Hyperlipidemia     Hypertension     Lab test positive for detection of COVID-19 virus 08/10/2021    Patient had tested himself at urgent care [facility].  Minimally symptomatic at this point.  Continue with precautions.  Patient's son is positive with significant problems.    Screening for diabetic retinopathy 08/31/2023    Negative     Past Surgical History:   Procedure Laterality Date    APPENDECTOMY      COLONOSCOPY N/A 12/27/2024    Procedure: COLONOSCOPY;  Surgeon: Frank Chan MD;  Location: [facility];  Service: Endoscopy;  Laterality: N/A;    HIP ARTHROPLASTY Right      Social History     Socioeconomic History    Marital status:      Spouse name: Sharyn Torre    Number of children: 2   Occupational History    Occupation:    Tobacco Use    Smoking status: Never    Smokeless tobacco: Never   Substance and Sexual Activity    Alcohol use: Yes     Comment: rarely    Drug use: No    Sexual activity: Yes     Partners: Female   Social History Narrative    Together 2 children- raised 6 total with Ms Coles     Social Drivers of Health     Financial Resource Strain: Low Risk  (12/3/2024)    Overall Financial Resource Strain (CARDIA)     Difficulty of Paying Living Expenses: Not very hard   Food Insecurity: No Food Insecurity (12/3/2024)    Hunger Vital Sign     Worried About Running Out of Food in the Last Year: Never true     Ran Out of Food in the Last Year: Never true   Transportation Needs: No Transportation Needs (12/3/2024)    PRAPARE - Transportation     Lack of Transportation (Medical): No     Lack of Transportation (Non-Medical): No   Physical Activity: Inactive (12/3/2024)    Exercise Vital Sign     Days of Exercise per Week:  "0 days     Minutes of Exercise per Session: 0 min   Stress: No Stress Concern Present (12/3/2024)    [employer] - Occupational Stress Questionnaire     Feeling of Stress : Not at all   Housing Stability: Low Risk  (12/3/2024)    Housing Stability Vital Sign     Unable to Pay for Housing in the Last Year: No     Homeless in the Last Year: No     Current Outpatient Medications   Medication Sig    aspirin 81 MG Chew Take 1 tablet by mouth once daily.    cetirizine 10 mg Cap Take 1 tablet by mouth once daily.    dulaglutide (TRULICITY) 4.5 mg/0.5 mL pen injector INJECT 4.5mg INTO THE SKIN EVERY 7 DAYS (Patient taking differently: Inject 4.5 mg into the skin every 7 days. Last injection on 12/14 holding until after procedure on 12/27)    FARXIGA 10 mg tablet TAKE 1 TABLET BY MOUTH ONCE DAILY IN THE MORNING    metFORMIN (GLUCOPHAGE) 500 MG tablet TAKE TWO TABLETS BY MOUTH TWICE DAILY    nystatin (MYCOSTATIN) ointment Apply topically 2 (two) times daily.    omeprazole (PRILOSEC) 20 MG capsule Take 20 mg by mouth once daily.    turmeric root extract 500 mg Cap Take 1 tablet by mouth 2 (two) times daily.    vit C/E/Zn/coppr/lutein/zeaxan (PRESERVISION AREDS-2 ORAL)     indapamide (LOZOL) 2.5 MG Tab Take 1 tablet (2.5 mg total) by mouth once daily.    triamcinolone acetonide 0.1% (KENALOG) 0.1 % cream AAA bid (Patient not taking: Reported on 1/8/2025)     No current facility-administered medications for this visit.         Objective:   /70 (BP Location: Right arm, Patient Position: Sitting)   Pulse 87   Ht 5' 8" (1.727 m)   SpO2 99%   BMI 27.22 kg/m²      Physical Exam  Vitals reviewed.   Constitutional:       General: He is not in acute distress.     Appearance: Normal appearance.   HENT:      Head: Normocephalic and atraumatic.   Eyes:      General: No scleral icterus.     Extraocular Movements: Extraocular movements intact.   Cardiovascular:      Pulses: Normal pulses.      Heart " "sounds: Normal heart sounds.      No friction rub.   Pulmonary:      Effort: Pulmonary effort is normal. No respiratory distress.      Breath sounds: Normal breath sounds.   Abdominal:      General: Abdomen is flat.      Palpations: Abdomen is soft.   Musculoskeletal:         General: No swelling.      Cervical back: Normal range of motion. No tenderness.      Right lower leg: No edema.      Left lower leg: No edema.   Neurological:      General: No focal deficit present.      Mental Status: He is oriented to person, place, and time.      Motor: No weakness.   Psychiatric:         Mood and Affect: Mood normal.         Behavior: Behavior normal.         Assessment:     Lab Results   Component Value Date    CREATININE 1.0 12/26/2024    BUN 17 12/26/2024     12/26/2024    K 5.2 (H) 12/26/2024     12/26/2024    CO2 31 (H) 12/26/2024     Lab Results   Component Value Date    PTH 27 12/03/2024    CALCIUM 10.2 12/26/2024    CAION 5.2 04/12/2023    PHOS 4.1 11/02/2023     Lab Results   Component Value Date    HCT 44.5 12/26/2024     No results found for: "UTPCR"    No results found for: "MICALBCREAT"        1. Pseudohypoaldosteronism, type 2    2. Hyperkalemia    3. Abnormal serum creatinine level    4. Essential hypertension    5. Well controlled diabetes mellitus    6. Non-smoker    7. Mixed dyslipidemia    8. Type 2 diabetes mellitus with stage 2 chronic kidney disease, without long-term current use of insulin        Plan:   Return to clinic in 2 months  Labs for next visit include Jose, uK, uOsm, sOsm, RFP, Mag.  Baseline creatinine is 0.9-1.1mg/dL.    Hyperkalemia  -renin/allison reviewed  -suspect pseudohypoaldo type 2> type1  -defer genetic testing for now  -extensively reviewed Low K diet with patient -> do no suspect dietary source  -add thz diuretic; helpful for reversing the likely issue and increasing distal Na delivery  -increase free water intake to 80oz per day    Hypertension  -hold amlodipine and " add indapamide; monitor BP trends; can restart CCB if his BP rises    DM type II without long term use of insulin and CKD G2  -UACR pending  -not on RASI d/t recurrent hyperkalemia issues  -f/u albuminuria to restage  -on SGLT2-I for CKD-MACE risk reduction, proteinuria reduction and eric-protection      __________________________  David Aguayo MD  [facility]    Part of this note has been created using [employer] dictation system. Errors in transcription may not be completely avoided.    Computed KFRE 2-Year unavailable. One or more values for this score either were not found within the given timeframe or did not fit some other criterion.    Computed KFRE 5-Year unavailable. One or more values for this score either were not found within the given timeframe or did not fit some other criterion.

## 2025-02-27 DIAGNOSIS — E78.2 MULTIPLE-TYPE HYPERLIPIDEMIA: ICD-10-CM

## 2025-02-27 RX ORDER — ATORVASTATIN CALCIUM 40 MG/1
40 TABLET, FILM COATED ORAL EVERY MORNING
Qty: 90 TABLET | Refills: 3 | OUTPATIENT
Start: 2025-02-27

## 2025-02-27 NOTE — TELEPHONE ENCOUNTER
No care due was identified.  [facility] Embedded Care Due Messages. Reference number: [id number].   2/27/2025 11:17:51 AM CST

## 2025-02-27 NOTE — TELEPHONE ENCOUNTER
Refill Decision Note   Mg Torre  is requesting a refill authorization.  Brief Assessment and Rationale for Refill:  Quick Discontinue     Medication Therapy Plan:  ATORVASTATIN 40MG WAS DISCONTINUED ON 12/26/24      Comments:     Note composed:12:03 PM 02/27/2025

## 2025-03-05 ENCOUNTER — LAB VISIT (OUTPATIENT)
Dept: LAB | Facility: HOSPITAL | Age: 70
End: 2025-03-05
Attending: STUDENT IN AN ORGANIZED HEALTH CARE EDUCATION/TRAINING PROGRAM
Payer: MEDICARE

## 2025-03-05 DIAGNOSIS — E87.5 HYPERKALEMIA: ICD-10-CM

## 2025-03-05 DIAGNOSIS — N25.89 PSEUDOHYPOALDOSTERONISM, TYPE 2: ICD-10-CM

## 2025-03-05 DIAGNOSIS — I10 ESSENTIAL HYPERTENSION: ICD-10-CM

## 2025-03-05 DIAGNOSIS — N18.2 TYPE 2 DIABETES MELLITUS WITH STAGE 2 CHRONIC KIDNEY DISEASE, WITHOUT LONG-TERM CURRENT USE OF INSULIN: ICD-10-CM

## 2025-03-05 DIAGNOSIS — E11.22 TYPE 2 DIABETES MELLITUS WITH STAGE 2 CHRONIC KIDNEY DISEASE, WITHOUT LONG-TERM CURRENT USE OF INSULIN: ICD-10-CM

## 2025-03-05 LAB
ALBUMIN SERPL BCP-MCNC: 4.4 G/DL (ref 3.5–5.2)
ALBUMIN/CREAT UR: NORMAL UG/MG (ref 0–30)
ANION GAP SERPL CALC-SCNC: 10 MMOL/L (ref 8–16)
BUN SERPL-MCNC: 19 MG/DL (ref 8–23)
CALCIUM SERPL-MCNC: 9.8 MG/DL (ref 8.7–10.5)
CHLORIDE SERPL-SCNC: 92 MMOL/L (ref 95–110)
CO2 SERPL-SCNC: 29 MMOL/L (ref 23–29)
CREAT SERPL-MCNC: 1 MG/DL (ref 0.5–1.4)
CREAT UR-MCNC: 49 MG/DL (ref 23–375)
EST. GFR  (NO RACE VARIABLE): >60 ML/MIN/1.73 M^2
GLUCOSE SERPL-MCNC: 157 MG/DL (ref 70–110)
MAGNESIUM SERPL-MCNC: 1.7 MG/DL (ref 1.6–2.6)
MICROALBUMIN UR DL<=1MG/L-MCNC: <7 UG/ML
OSMOLALITY SERPL: 288 MOSM/KG (ref 280–300)
OSMOLALITY UR: 529 MOSM/KG (ref 50–1200)
PHOSPHATE SERPL-MCNC: 3.4 MG/DL (ref 2.7–4.5)
POTASSIUM SERPL-SCNC: 4 MMOL/L (ref 3.5–5.1)
POTASSIUM UR-SCNC: 29 MMOL/L (ref 15–95)
SODIUM SERPL-SCNC: 131 MMOL/L (ref 136–145)
SODIUM UR-SCNC: 76 MMOL/L (ref 20–250)

## 2025-03-05 PROCEDURE — 83735 ASSAY OF MAGNESIUM: CPT | Performed by: STUDENT IN AN ORGANIZED HEALTH CARE EDUCATION/TRAINING PROGRAM

## 2025-03-05 PROCEDURE — 83935 ASSAY OF URINE OSMOLALITY: CPT | Performed by: STUDENT IN AN ORGANIZED HEALTH CARE EDUCATION/TRAINING PROGRAM

## 2025-03-05 PROCEDURE — 82043 UR ALBUMIN QUANTITATIVE: CPT | Performed by: STUDENT IN AN ORGANIZED HEALTH CARE EDUCATION/TRAINING PROGRAM

## 2025-03-05 PROCEDURE — 83930 ASSAY OF BLOOD OSMOLALITY: CPT | Performed by: STUDENT IN AN ORGANIZED HEALTH CARE EDUCATION/TRAINING PROGRAM

## 2025-03-05 PROCEDURE — 80069 RENAL FUNCTION PANEL: CPT | Performed by: STUDENT IN AN ORGANIZED HEALTH CARE EDUCATION/TRAINING PROGRAM

## 2025-03-05 PROCEDURE — 84300 ASSAY OF URINE SODIUM: CPT | Performed by: STUDENT IN AN ORGANIZED HEALTH CARE EDUCATION/TRAINING PROGRAM

## 2025-03-05 PROCEDURE — 36415 COLL VENOUS BLD VENIPUNCTURE: CPT | Performed by: STUDENT IN AN ORGANIZED HEALTH CARE EDUCATION/TRAINING PROGRAM

## 2025-03-05 PROCEDURE — 84133 ASSAY OF URINE POTASSIUM: CPT | Performed by: STUDENT IN AN ORGANIZED HEALTH CARE EDUCATION/TRAINING PROGRAM

## 2025-03-11 ENCOUNTER — OFFICE VISIT (OUTPATIENT)
Dept: NEPHROLOGY | Facility: CLINIC | Age: 70
End: 2025-03-11
Payer: MEDICARE

## 2025-03-11 DIAGNOSIS — E11.22 TYPE 2 DIABETES MELLITUS WITH STAGE 2 CHRONIC KIDNEY DISEASE, WITHOUT LONG-TERM CURRENT USE OF INSULIN: ICD-10-CM

## 2025-03-11 DIAGNOSIS — I10 ESSENTIAL HYPERTENSION: ICD-10-CM

## 2025-03-11 DIAGNOSIS — N18.2 TYPE 2 DIABETES MELLITUS WITH STAGE 2 CHRONIC KIDNEY DISEASE, WITHOUT LONG-TERM CURRENT USE OF INSULIN: ICD-10-CM

## 2025-03-11 DIAGNOSIS — E11.9 WELL CONTROLLED DIABETES MELLITUS: ICD-10-CM

## 2025-03-11 DIAGNOSIS — E78.2 MIXED DYSLIPIDEMIA: ICD-10-CM

## 2025-03-11 DIAGNOSIS — E87.5 HYPERKALEMIA: Primary | ICD-10-CM

## 2025-03-11 DIAGNOSIS — N25.89 PSEUDOHYPOALDOSTERONISM, TYPE 2: ICD-10-CM

## 2025-03-11 DIAGNOSIS — E87.1 HYPONATREMIA: ICD-10-CM

## 2025-03-11 RX ORDER — INDAPAMIDE 1.25 MG/1
1.25 TABLET ORAL DAILY
Qty: 30 TABLET | Refills: 11 | Status: SHIPPED | OUTPATIENT
Start: 2025-03-11 | End: 2026-03-11

## 2025-03-11 NOTE — PROGRESS NOTES
The patient location is:  Home  The chief complaint leading to consultation is: who returns for ongoing evaluation and management of CKD.       Visit type: audiovisual    Face to Face time with patient: 14min  22 minutes of total time spent on the encounter, which includes face to face time and non-face to face time preparing to see the patient (eg, review of tests), Obtaining and/or reviewing separately obtained history, Documenting clinical information in the electronic or other health record, Independently interpreting results (not separately reported) and communicating results to the patient/family/caregiver, or Care coordination (not separately reported).         Each patient to whom he or she provides medical services by telemedicine is:  (1) informed of the relationship between the physician and patient and the respective role of any other health care provider with respect to management of the patient; and (2) notified that he or she may decline to receive medical services by telemedicine and may withdraw from such care at any time.    Notes:       [facility]  Nephrology Clinic      Subjective:       HPI ID: Mg Torre is a 70 y.o. male who returns for ongoing evaluation and management of CKD.     Mg Torre is referred by Salazar Campbell MD to be evaluated for hyperkalemia.  He has ongoing conditions of hypertension, hyperlipidemia, diabetes mellitus type 2.  We reviewed his recent chemistry panels at chair side.  Last chemistry panel was from 12/26/2024 featuring a serum creatinine creatinine of 1.0 mg/dL which appears consistent with his trends over the last year, and potassium 5.2 mEq per L. his serum potassium over the last 3 years ranges from 4.3-6.2 mEq per L with most values in the 5s.    In terms of his renal history, he denies hospitalizations for prior MALINDA or MALINDA requiring RRT. Denies history of nephrolithiasis or hematuria episodes. No reported history of  frequent or recurrent use of NSAIDs/COXI. No pertinent rheumatologic or AI disease history. Denies any pertinent family history of known kidney disease, or family members diagnosed with ESRD requiring dialysis.  Smoking Hx: denies  Other pertinent urologic history: denies  Fluid intake per 24hr: 40 oz per day.     Interval history:  3/11/25 - here for f/u today. Started on 2.5mg indapamide (THZ) in the interim and now with resolution of hyperkalemia. He does have increased episodes of dizziness on standing since starting the medication.  We discussed alternatives but settled on reducing the dose of indapamide for now.  He is also well maintained on SGLT2 inhibitor.  We discussed adding more protein/solute in the diet to offset lower sodium trend on his chemistry.  (his hyponatremia is isotonic based on available labs)      Review of Systems   Constitutional:  Negative for chills, diaphoresis and fever.   Respiratory:  Negative for cough and shortness of breath.    Cardiovascular:  Negative for chest pain and leg swelling.   Gastrointestinal:  Negative for abdominal pain, diarrhea, nausea and vomiting.   Genitourinary:  Negative for difficulty urinating, dysuria and hematuria.   Musculoskeletal:  Negative for myalgias.   Neurological:  Negative for headaches.   Hematological:  Does not bruise/bleed easily.       The past medical, family and social histories were reviewed for this encounter.     Past Medical History:   Diagnosis Date    Allergy     pcn    Diabetes mellitus     Diabetes mellitus, type 2     Hyperkalemia     Hyperlipidemia     Hypertension     Lab test positive for detection of COVID-19 virus 08/10/2021    Patient had tested himself at urgent care in [facility].  Minimally symptomatic at this point.  Continue with precautions.  Patient's son is positive with significant problems.    Screening for diabetic retinopathy 08/31/2023    Negative       Current Outpatient Medications   Medication  "Sig    aspirin 81 MG Chew Take 1 tablet by mouth once daily.    cetirizine 10 mg Cap Take 1 tablet by mouth once daily.    dulaglutide (TRULICITY) 4.5 mg/0.5 mL pen injector INJECT 4.5mg INTO THE SKIN EVERY 7 DAYS (Patient taking differently: Inject 4.5 mg into the skin every 7 days. Last injection on 12/14 holding until after procedure on 12/27)    FARXIGA 10 mg tablet TAKE 1 TABLET BY MOUTH ONCE DAILY IN THE MORNING    indapamide (LOZOL) 1.25 MG Tab Take 1 tablet (1.25 mg total) by mouth once daily.    metFORMIN (GLUCOPHAGE) 500 MG tablet TAKE TWO TABLETS BY MOUTH TWICE DAILY    nystatin (MYCOSTATIN) ointment Apply topically 2 (two) times daily.    omeprazole (PRILOSEC) 20 MG capsule Take 20 mg by mouth once daily.    triamcinolone acetonide 0.1% (KENALOG) 0.1 % cream AAA bid (Patient not taking: Reported on 1/8/2025)    turmeric root extract 500 mg Cap Take 1 tablet by mouth 2 (two) times daily.    vit C/E/Zn/coppr/lutein/zeaxan (PRESERVISION AREDS-2 ORAL)      No current facility-administered medications for this visit.         Objective:   There were no vitals taken for this visit.     Physical Exam    Assessment:     Lab Results   Component Value Date    CREATININE 1.0 03/05/2025    BUN 19 03/05/2025     (L) 03/05/2025    K 4.0 03/05/2025    CL 92 (L) 03/05/2025    CO2 29 03/05/2025     Lab Results   Component Value Date    PTH 27 12/03/2024    CALCIUM 9.8 03/05/2025    CAION 5.2 04/12/2023    PHOS 3.4 03/05/2025     Lab Results   Component Value Date    HCT 44.5 12/26/2024     No results found for: "UTPCR"    Lab Results   Component Value Date    MICALBCREAT Unable to calculate 03/05/2025           1. Hyperkalemia    2. Pseudohypoaldosteronism, type 2    3. Well controlled diabetes mellitus    4. Essential hypertension    5. Mixed dyslipidemia    6. Type 2 diabetes mellitus with stage 2 chronic kidney disease, without long-term current use of insulin    7. Hyponatremia          Plan:   Return to clinic " in 3 months  Labs for next visit include Jose, uK, uOsm, sOsm, RFP, Mag.  Baseline creatinine is 0.9-1.1mg/dL.    Hyperkalemia - improved  -renin/allison reviewed, acceptable.   -check cortisol   -pseudo hypoaldo? ?type II. Possible but less likely without original met alkalosis  -defer genetic testing for now  -extensively reviewed Low K diet with patient -> do no suspect dietary source  -improved conidition with THZ diuretic class.   -increase free water intake to 80oz per day    Hypertension  -120s/70s at home but having symptoms of dizziness/lightheadness more frequently. Reduce dose of THZ diuretic today. Can stop if symptoms persist    DM type II without long term use of insulin and CKD G2  -UACR pending  -not on RASI d/t recurrent hyperkalemia issues  -albuminuria controlled.   -on SGLT2-I for CKD-MACE risk reduction, proteinuria reduction and eric-protection    Hyponatremia, isotonic  -serum osm 288 = isotonic. No directed corrective treatment needed.   -likely related to THZ use, induced IVVD.  -reduce dose of THZ diuretic today  -increase protein in the diet, target >50g of protein per day.  -maintain good hydration      __________________________  David Aguayo MD  [facility]    Part of this note has been created using [employer] dictation system. Errors in transcription may not be completely avoided.    Computed KFRE 2-Year unavailable. One or more values for this score either were not found within the given timeframe or did not fit some other criterion.    Computed KFRE 5-Year unavailable. One or more values for this score either were not found within the given timeframe or did not fit some other criterion.

## 2025-03-14 DIAGNOSIS — E11.9 TYPE 2 DIABETES MELLITUS WITHOUT COMPLICATION, WITHOUT LONG-TERM CURRENT USE OF INSULIN: ICD-10-CM

## 2025-03-14 NOTE — TELEPHONE ENCOUNTER
Care Due:                  Date            Visit Type   Department     Provider  --------------------------------------------------------------------------------                                             [facility]   [address]  Last Visit: 11-      PATIENT      FAMILY Yvonne Campbell                               -         [facility] [address]  Next Visit: 04-      CARE ([facility])   FAMILY Russell  Shannan                                                            Last  Test          Frequency    Reason                     Performed    Due Date  --------------------------------------------------------------------------------    HBA1C.......  6 months...  FARXIGA, dulaglutide,      12- 05-                             metFORMIN................    [employer] Embedded Care Due Messages. Reference number: [id number].   3/14/2025 12:38:06 PM CDT

## 2025-03-14 NOTE — TELEPHONE ENCOUNTER
Refill Routing Note   Medication(s) are not appropriate for processing by [facility] for the following reason(s):        Drug-disease interaction    [facility] action(s):  Defer        [facility]    Pharmacist review requested: Yes     Appointments  past 12m or future 3m with PCP    Date Provider   Last Visit   11/26/2024 Salazar Campbell MD   Next Visit   4/10/2025 Salazar Campbell MD   ED visits in past 90 days: 0        Note composed:12:39 PM 03/14/2025

## 2025-03-14 NOTE — TELEPHONE ENCOUNTER
Refill Routing Note   Medication(s) are not appropriate for processing by [facility] for the following reason(s):        Drug-disease interaction    [facility] action(s):  Defer      Medication Therapy Plan: FLOS; PSEUDOHYPOALDOSTERONISM    Pharmacist review requested: Yes     Appointments  past 12m or future 3m with PCP    Date Provider   Last Visit   11/26/2024 Salazar Campbell MD   Next Visit   4/10/2025 Salazar Campbell MD   ED visits in past 90 days: 0        Note composed:12:56 PM 03/14/2025

## 2025-03-15 RX ORDER — METFORMIN HYDROCHLORIDE 500 MG/1
1000 TABLET ORAL 2 TIMES DAILY
Qty: 360 TABLET | Refills: 0 | Status: SHIPPED | OUTPATIENT
Start: 2025-03-15

## 2025-03-20 ENCOUNTER — TELEPHONE (OUTPATIENT)
Dept: PHARMACY | Facility: CLINIC | Age: 70
End: 2025-03-20
Payer: MEDICARE

## 2025-03-20 NOTE — TELEPHONE ENCOUNTER
[facility]/Population Health Chart Review & Patient Outreach Details For Medication Adherence Project    Reason for Outreach Encounter: 3rd Party payor non-compliance report ([employer], [employer], [facility], etc)  2.  Patient Outreach Method: Reviewed patient chart   3.   Medication in question:    Diabetes Medications              dulaglutide (TRULICITY) 4.5 mg/0.5 mL pen injector INJECT 4.5mg INTO THE SKIN EVERY 7 DAYS    FARXIGA 10 mg tablet TAKE 1 TABLET BY MOUTH ONCE DAILY IN THE MORNING    metFORMIN (GLUCOPHAGE) 500 MG tablet Take 2 tablets (1,000 mg total) by mouth 2 (two) times daily.              Hypertension Medications              indapamide (LOZOL) 1.25 MG Tab Take 1 tablet (1.25 mg total) by mouth once daily.                 Trulicity and Farxiga LF 28 ds 37/25  Metformin LF 90 ds 3/15/25        4.  Reviewed and or Updates Made To: Patient Chart  5. Outreach Outcomes and/or actions taken: Patient filled medication and is on track to be adherent  Additional Notes:   Pt not on a statin

## 2025-04-03 RX ORDER — DAPAGLIFLOZIN 10 MG/1
10 TABLET, FILM COATED ORAL EVERY MORNING
Qty: 90 TABLET | Refills: 0 | Status: SHIPPED | OUTPATIENT
Start: 2025-04-03

## 2025-04-03 NOTE — TELEPHONE ENCOUNTER
No care due was identified.  [facility] Embedded Care Due Messages. Reference number: [id number].   4/03/2025 8:04:06 AM CDT

## 2025-04-03 NOTE — TELEPHONE ENCOUNTER
Refill Decision Note   Mg Torre  is requesting a refill authorization.  Brief Assessment and Rationale for Refill:  Approve     Medication Therapy Plan:         Comments:     Note composed:9:13 AM 04/03/2025

## 2025-04-10 ENCOUNTER — OFFICE VISIT (OUTPATIENT)
Dept: FAMILY MEDICINE | Facility: CLINIC | Age: 70
End: 2025-04-10
Payer: MEDICARE

## 2025-04-10 VITALS
SYSTOLIC BLOOD PRESSURE: 125 MMHG | BODY MASS INDEX: 27.3 KG/M2 | RESPIRATION RATE: 17 BRPM | WEIGHT: 180.13 LBS | HEIGHT: 68 IN | HEART RATE: 82 BPM | OXYGEN SATURATION: 97 % | DIASTOLIC BLOOD PRESSURE: 79 MMHG | TEMPERATURE: 98 F

## 2025-04-10 DIAGNOSIS — L40.9 PSORIASIS: ICD-10-CM

## 2025-04-10 DIAGNOSIS — E87.1 LOW SODIUM LEVELS: ICD-10-CM

## 2025-04-10 DIAGNOSIS — E11.9 TYPE 2 DIABETES MELLITUS WITHOUT COMPLICATION, WITHOUT LONG-TERM CURRENT USE OF INSULIN: Primary | Chronic | ICD-10-CM

## 2025-04-10 DIAGNOSIS — I10 BENIGN ESSENTIAL HYPERTENSION: Chronic | ICD-10-CM

## 2025-04-10 DIAGNOSIS — E87.5 HYPERKALEMIA: ICD-10-CM

## 2025-04-10 DIAGNOSIS — E78.2 MULTIPLE-TYPE HYPERLIPIDEMIA: ICD-10-CM

## 2025-04-10 PROCEDURE — 99999 PR PBB SHADOW E&M-EST. PATIENT-LVL IV: CPT | Mod: PBBFAC,,, | Performed by: INTERNAL MEDICINE

## 2025-04-10 RX ORDER — ATORVASTATIN CALCIUM 40 MG/1
40 TABLET, FILM COATED ORAL DAILY
Qty: 90 TABLET | Refills: 3 | Status: SHIPPED | OUTPATIENT
Start: 2025-04-10 | End: 2026-04-10

## 2025-04-10 NOTE — PROGRESS NOTES
Subjective:       Patient ID: Mg Torre is a 70 y.o. male.    Chief Complaint: Follow-up (Bp, cholesterol, dm), Diabetes, Hypertension, Hyperlipidemia, and Gastroesophageal Reflux    Mr. Tico Torre is a 70-year-old gentleman who comes for follow-up.  Underlying medical issues include the following:-    1.-type 2 diabetes mellitus with variable control and currently on 3 medications including Farxiga, Trulicity and  2.-hypertension  3.-hyperlipidemia  4.-intermittent episodes of hyperkalemia easy explanation    History of Present Illness    CHIEF COMPLAINT:  Mg presents for a follow-up visit to discuss recent lab results and kidney function concerns.    HPI:  Mg was recently evaluated by Dr. David Aguayo, a nephrologist, via virtual consultation due to concerns about his potassium and sodium levels. His potassium was previously high but is now reported as low at 4.0. His sodium level was noted to be low at 131. Mg has been diagnosed with chronic kidney disease stage 2, which the doctor states is not a serious concern. Mg's magnesium level was slightly low at 1.7.    His blood sugar was 160 this morning, noting that it is consistently elevated in the morning. He acknowledges inconsistent blood sugar monitoring in the afternoon and evening.    Mg mentions he has psoriasis, which had previously improved after seeing a doctor but is now recurring with increased appearance of spots. He reports it has been over a year since his last dermatology visit.    Regarding urinary symptoms, he reports intermittent difficulty with urination and nocturia, but states these symptoms are variable.    Mg also mentions a history of COVID-19 infection, describing the recovery process as challenging.    He denies any muscle aches or joint pain related to statin use. He denies any current difficulty with blood flow or neuropathy in his feet.    MEDICATIONS:  Mg is on Metformin, taking 2 tablets twice  daily for diabetes. He is also on Trulicity 4.5 mg, administered weekly on Saturdays for diabetes management. Farxiga (dapagliflozin) is prescribed for both diabetes and kidney function. Mg is on Indapamide for blood pressure control. He has discontinued atorvastatin, which was previously used for cholesterol management. Farxiga (dapagliflozin) was started at some point and has helped bring his potassium levels down.    MEDICAL HISTORY:  Mg has a history of chronic kidney disease (Stage 2), diabetes, psoriasis, and hypertension. Mg received the Pneumonia vaccine (Prevnar) on 04/12/2023 and a flu vaccine on 11/26/2022.    TEST RESULTS:  On March 5th, the patient's sodium level was low at 131, while potassium was 4.0, which was a significant improvement from previous measurements. His blood sugar was 157, BUN 19, creatinine 1, and GFR >60. Magnesium was slightly low at 1.7. Urine tests showed potassium at 29, sodium at 76, and osmolality within range at 529. Urine protein was normal. Mg's cholesterol was last checked in March of the previous year.    SOCIAL HISTORY:  Denies current smoking Occupation: Retired, former       ROS:  General: -fever, -chills, -fatigue, -weight gain, -weight loss  Cardiovascular: -chest pain, -palpitations, -lower extremity edema  Respiratory: -cough, -shortness of breath  Gastrointestinal: -abdominal pain, -nausea, -vomiting, -diarrhea, -constipation, -blood in stool  Skin: -rash, +lesion  Neurological: -headache, -dizziness, -numbness, -tingling  Male Genitourinary: +frequency  Musculoskeletal: +joint stiffness, +muscle stiffness       Recently had consulted Dr. David Aguayo on a virtual platform for long-term management of hyperkalemia.  He is not considered to be using long-term NSAIDs or Hatch inhibitors.  No history of acute kidney injury.    He is known to be on SGLT2 .  He is also advised to increase protein and solute intake all sets lower sodium trend in  his chemistry hyponatremia was considered to be based upon available labs.    Final impression was hyperkalemia and pseudo hypoaldosteronism type 2 in his setting of well-controlled diabetes, hypertension, hyperlipidemia and CKD stage due.  Natremia also has been noted    Your labs include checking for a.m. cortisol level    Postponement of genetic testing.  Past Medical History:   Diagnosis Date    Allergy     pcn    Diabetes mellitus     Diabetes mellitus, type 2     Hyperkalemia     Hyperlipidemia     Hypertension     Lab test positive for detection of COVID-19 virus 08/10/2021    Patient had tested himself at urgent care in [facility].  Minimally symptomatic at this point.  Continue with precautions.  Patient's son is positive with significant problems.    Screening for diabetic retinopathy 08/31/2023    Negative     Social History[1]  Past Surgical History:   Procedure Laterality Date    APPENDECTOMY      COLONOSCOPY N/A 12/27/2024    Procedure: COLONOSCOPY;  Surgeon: Frank Chan MD;  Location: [facility];  Service: Endoscopy;  Laterality: N/A;    HIP ARTHROPLASTY Right      Family History   Problem Relation Name Age of Onset    Heart disease Father [place]     Obesity Son         Objective:      Physical Exam  Cardiovascular:      Pulses:           Dorsalis pedis pulses are 1+ on the right side and 1+ on the left side.   Musculoskeletal:      Right foot: Normal range of motion. No deformity.      Left foot: Normal range of motion. No deformity.   Feet:      Right foot:      Protective Sensation: 5 sites tested.  5 sites sensed.      Skin integrity: No ulcer or blister.      Toenail Condition: Right toenails are long.      Left foot:      Protective Sensation: 5 sites tested.  5 sites sensed.      Skin integrity: No ulcer or blister.      Toenail Condition: Left toenails are long.     Blood pressure 125/79, pulse 82, temperature 97.7 °F (36.5 °C), temperature source Oral, resp.  "rate 17, height 5' 8" (1.727 m), weight 81.7 kg (180 lb 1.9 oz), SpO2 97%. Body mass index is 27.39 kg/m².  Physical Exam    General: No acute distress. Well-developed. Well-nourished.  Eyes: EOMI. Sclerae anicteric.  Cardiovascular: Regular rate. Regular rhythm. No murmurs. No rubs. No gallops. Normal S1, S2. Dorsalis pedis pulse 1+ bilaterally.  Respiratory: Normal respiratory effort. Clear to auscultation bilaterally. No rales. No rhonchi. No wheezing.  Musculoskeletal: No  obvious deformity.  Extremities: No lower extremity edema.  Neurological: Alert & oriented to situation and contact  Psychiatric:  Generally jovial and euthymic.  Skin: Warm.  Chronic changes of psoriasis Dry. No skin breakdown. No ulceration. Nails are slightly long. No callus.  Diabetic Foot: Monofilament sensations intact. Temperature sensations intact.              Assessment:       Lab Visit on 03/05/2025   Component Date Value Ref Range Status    Glucose 03/05/2025 157 (H)  70 - 110 mg/dL Final    Sodium 03/05/2025 131 (L)  136 - 145 mmol/L Final    Potassium 03/05/2025 4.0  3.5 - 5.1 mmol/L Final    Chloride 03/05/2025 92 (L)  95 - 110 mmol/L Final    CO2 03/05/2025 29  23 - 29 mmol/L Final    BUN 03/05/2025 19  8 - 23 mg/dL Final    Calcium 03/05/2025 9.8  8.7 - 10.5 mg/dL Final    Creatinine 03/05/2025 1.0  0.5 - 1.4 mg/dL Final    Albumin 03/05/2025 4.4  3.5 - 5.2 g/dL Final    Phosphorus 03/05/2025 3.4  2.7 - 4.5 mg/dL Final    eGFR 03/05/2025 >60.0  >60 mL/min/1.73 m^2 Final    Anion Gap 03/05/2025 10  8 - 16 mmol/L Final    Potassium, Urine 03/05/2025 29  15 - 95 mmol/L Final    Sodium, Urine 03/05/2025 76  20 - 250 mmol/L Final    Magnesium 03/05/2025 1.7  1.6 - 2.6 mg/dL Final    Osmolality 03/05/2025 288  280 - 300 mOsm/kg Final    Osmolality, Urine 03/05/2025 529  50 - 1200 mOsm/kg Final    Microalbumin, Urine 03/05/2025 <7.0  <19.9 ug/mL Final    Creatinine, Urine 03/05/2025 49.0  23.0 - 375.0 mg/dL Final    Microalb/Creat " Ratio 03/05/2025 Unable to calculate  0.0 - 30.0 ug/mg Final       Assessment & Plan    N18.2 Chronic kidney disease, stage 2 (mild)  E11.9 Type 2 diabetes mellitus without complications  I10 Essential (primary) hypertension  L40.9 Psoriasis, unspecified  E87.1 Hypo-osmolality and hyponatremia  E83.40 Disorders of magnesium metabolism, unspecified  R35.1 Nocturia  U09.9 Post COVID-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  - Noted recent potassium level at 4.0, following up with nephrology for further workup of suspected pseudo hypoaldosteronism.  - Observed low sodium levels.  - Memorialized intolerance to ACE inhibitors or ARBs due to risk of dangerous hyperkalemia.  - Conducted foot exam.  - Noted psoriasis recurrence.    N18.2 CHRONIC KIDNEY DISEASE, STAGE 2 (MILD):  - Continued Farxiga (dapagliflozin) for kidney protection.  - Ordered morning renal function tests.  - Reviewed recent lab results: BUN 19, creatinine 1, and normal urine protein.  - Noted GFR greater than 60.  - Ordered a comprehensive panel of tests for future kidney function assessment, including morning tests and urine tests for sodium and potassium.  - Confirmed nephrologist's diagnosis of chronic kidney disease stage 2, with no serious concerns at present.    E11.9 TYPE 2 DIABETES MELLITUS WITHOUT COMPLICATIONS:  - Continued metformin 2 tablets twice daily, Trulicity 4.5 mg injection every Saturday, and Farxiga for diabetes management.  - Ordered A1C test.  - Noted recent blood sugar of 157 and morning blood sugar of 160.  - Instructed the patient to perform regular blood sugar checks at various times throughout the day.    I10 ESSENTIAL (PRIMARY) HYPERTENSION:  - Continued indapamide for blood pressure control.  - Noted that Farxiga may also assist  with blood pressure management.  - Acknowledged patient's intolerance to ACE inhibitors or ARBs due to risk of hyperkalemia.    L40.9 PSORIASIS, UNSPECIFIED:  - Referred the patient to a dermatologist for psoriasis management.  - Discussed alternative treatments due to cost constraints.  - Noted that psoriasis is recurring with more spots appearing.  - Advised the patient to follow up with the dermatologist for treatment.    E87.1 HYPO-OSMOLALITY AND HYPONATREMIA:  - Recommend slightly increasing salt consumption.  - Ordered urine sodium and potassium tests.  - Noted recent lab results showing low sodium levels of 131.  - Advised the patient to increase protein intake and consider adding more salt to diet.    E83.40 DISORDERS OF MAGNESIUM METABOLISM, UNSPECIFIED:  - Noted recent lab results showing slightly low magnesium level at 1.7.    R35.1 NOCTURIA:  - Discussed the relationship between adequate hydration and urinary symptoms.  - Noted patient's report of occasional need to urinate in the middle of the night.  - Suggested inadequate water intake and excessive soft drink consumption as potential causes.    U09.9 POST COVID-19 CONDITION, UNSPECIFIED:  - Noted patient's report of feeling sick since having COVID-19 and experiencing difficulty in recuperating.    Z86.16 PERSONAL HISTORY OF COVID-19:  - Documented patient's history of COVID-19 infection.         Plan:   Type 2 diabetes mellitus without complication, without long-term current use of insulin  Comments:  Currently he is on Trulicity 4.5 Q weekly, metformin 1000 b.i.d. and Farxiga.  Check A1c level.  Continue same meds.  Continue to watch diet  Orders:  -     Hemoglobin A1C; Future; Expected date: 04/11/2025  -     atorvastatin (LIPITOR) 40 MG tablet; Take 1 tablet (40 mg total) by mouth once daily.  Dispense: 90 tablet; Refill: 3    Benign essential hypertension  Comments:  He is currently not on any Ace or ARB due to concerns about hyperkalemia.He is  currently on indapamide.  Farxiga may also help with some blood pressure control.    Multiple-type hyperlipidemia  Comments:  For some reason atorvastatin was discontinued., he does not resolve recall any muscle aches or abnormalities and liver.  Will resume it now.  Orders:  -     Lipid Panel; Future; Expected date: 04/11/2025  -     atorvastatin (LIPITOR) 40 MG tablet; Take 1 tablet (40 mg total) by mouth once daily.  Dispense: 90 tablet; Refill: 3    Psoriasis  Comments:  Psoriasis is coming back apparently with the mentions. He can not afford expensive medications and he will check with a dermatologist for alternative treatment.    Hyperkalemia  Comments:  Thankfully the recent potassium level is 4.0.  He is following up with the Nephrology for further workup and it is considered to be pseudo hypoaldosteronism.    Low sodium levels  Comments:  Low sodium levels has been noted.  He has been advised to increase his protein intake and perhaps a little more salt.  He does drink a lot of water.    Patient's medical conditions noted.  Control of diabetes to be checked again with A1c level  Check lipid panel also.  Resume atorvastatin.  Discussed about RSV vaccination and information given.  Intolerance to Ace or ARB will be memorial lysed at this point given hyperkalemia which could be dangerous.  Continue with the exercise and walking.  If all goes well, will see him back in 4 months time.    Foot examination done today.    He is updated on eye checkup    Follow up in about 4 months (around 8/10/2025), or if symptoms worsen or fail to improve, for Diabetes/HTN/Lipids.    Current Medications[2]    This note was generated with the assistance of ambient listening technology. Verbal consent was obtained by the patient and accompanying visitor(s) for the recording of patient appointment to facilitate this note. I attest to having reviewed and edited the generated note for accuracy, though some syntax or spelling errors  may persist. Please contact the author of this note for any clarification.      Salazar Campbell    Visit today included increased complexity associated with the care of the episodic problem HTN,Lipids,DM addressed and managing the longitudinal care of the patient due to the serious and/or complex managed problem(s) DM, Lipids.          [1]   Social History  Socioeconomic History    Marital status:      Spouse name: Sharyn Torre    Number of children: 2   Occupational History    Occupation:    Tobacco Use    Smoking status: Never    Smokeless tobacco: Never   Substance and Sexual Activity    Alcohol use: Yes     Comment: rarely    Drug use: No    Sexual activity: Yes     Partners: Female   Social History Narrative    Together 2 children- raised 6 total with Ms Coles     Social Drivers of Health     Financial Resource Strain: Low Risk  (12/3/2024)    Overall Financial Resource Strain (CARDIA)     Difficulty of Paying Living Expenses: Not very hard   Food Insecurity: No Food Insecurity (12/3/2024)    Hunger Vital Sign     Worried About Running Out of Food in the Last Year: Never true     Ran Out of Food in the Last Year: Never true   Transportation Needs: No Transportation Needs (12/3/2024)    PRAPARE - Transportation     Lack of Transportation (Medical): No     Lack of Transportation (Non-Medical): No   Physical Activity: Inactive (12/3/2024)    Exercise Vital Sign     Days of Exercise per Week: 0 days     Minutes of Exercise per Session: 0 min   Stress: No Stress Concern Present (12/3/2024)    [employer] [place] [employer] - Occupational Stress Questionnaire     Feeling of Stress : Not at all   Housing Stability: Low Risk  (4/10/2025)    Housing Stability Vital Sign     Unable to Pay for Housing in the Last Year: No     Number of Times Moved in the Last Year: 1     Homeless in the Last Year: No   [2]   Current Outpatient Medications:     aspirin 81 MG Chew, Take 1 tablet by mouth once  daily., Disp: , Rfl:     cetirizine 10 mg Cap, Take 1 tablet by mouth once daily., Disp: , Rfl:     dulaglutide (TRULICITY) 4.5 mg/0.5 mL pen injector, INJECT 4.5mg INTO THE SKIN EVERY 7 DAYS, Disp: 4 Pen, Rfl: 11    FARXIGA 10 mg tablet, TAKE 1 TABLET BY MOUTH ONCE DAILY IN THE MORNING, Disp: 90 tablet, Rfl: 0    indapamide (LOZOL) 1.25 MG Tab, Take 1 tablet (1.25 mg total) by mouth once daily., Disp: 30 tablet, Rfl: 11    metFORMIN (GLUCOPHAGE) 500 MG tablet, Take 2 tablets (1,000 mg total) by mouth 2 (two) times daily., Disp: 360 tablet, Rfl: 0    nystatin (MYCOSTATIN) ointment, Apply topically 2 (two) times daily., Disp: 15 g, Rfl: 0    omeprazole (PRILOSEC) 20 MG capsule, Take 20 mg by mouth once daily., Disp: , Rfl:     turmeric root extract 500 mg Cap, Take 1 tablet by mouth 2 (two) times daily., Disp: , Rfl:     vit C/E/Zn/coppr/lutein/zeaxan (PRESERVISION AREDS-2 ORAL), , Disp: , Rfl:     atorvastatin (LIPITOR) 40 MG tablet, Take 1 tablet (40 mg total) by mouth once daily., Disp: 90 tablet, Rfl: 3    triamcinolone acetonide 0.1% (KENALOG) 0.1 % cream, AAA bid (Patient not taking: Reported on 4/10/2025), Disp: 454 g, Rfl: 3

## 2025-05-07 ENCOUNTER — TELEPHONE (OUTPATIENT)
Dept: FAMILY MEDICINE | Facility: CLINIC | Age: 70
End: 2025-05-07
Payer: MEDICARE

## 2025-05-07 NOTE — TELEPHONE ENCOUNTER
----- Message from Med Assistant Wesley sent at 5/7/2025 11:09 AM CDT -----  [facility] Billing would like a call back to verify the patients insurance [id number] number [id number]

## 2025-05-23 ENCOUNTER — TELEPHONE (OUTPATIENT)
Dept: PHARMACY | Facility: CLINIC | Age: 70
End: 2025-05-23
Payer: MEDICARE

## 2025-05-23 NOTE — TELEPHONE ENCOUNTER
[facility]/Population Health Chart Review & Patient Outreach Details For Medication Adherence Project    Reason for Outreach Encounter: 3rd Party payor non-compliance report ([employer], [employer], [employer], etc)  2.  Patient Outreach Method: Reviewed Patient Chart  3.   Medication in question: trulicity   LAST FILLED: 4/28/25 for 28 day supply  Diabetes Medications              dulaglutide (TRULICITY) 4.5 mg/0.5 mL pen injector INJECT 4.5mg INTO THE SKIN EVERY 7 DAYS    FARXIGA 10 mg tablet TAKE 1 TABLET BY MOUTH ONCE DAILY IN THE MORNING    metFORMIN (GLUCOPHAGE) 500 MG tablet Take 2 tablets (1,000 mg total) by mouth 2 (two) times daily.              4.  Reviewed and or Updates Made To: Patient Chart  5. Outreach Outcomes and/or actions taken: Patient filled medication and is on track to be adherent

## 2025-06-13 ENCOUNTER — RESULTS FOLLOW-UP (OUTPATIENT)
Dept: FAMILY MEDICINE | Facility: CLINIC | Age: 70
End: 2025-06-13

## 2025-06-13 ENCOUNTER — LAB VISIT (OUTPATIENT)
Dept: LAB | Facility: HOSPITAL | Age: 70
End: 2025-06-13
Attending: STUDENT IN AN ORGANIZED HEALTH CARE EDUCATION/TRAINING PROGRAM
Payer: MEDICARE

## 2025-06-13 DIAGNOSIS — E87.1 HYPONATREMIA: ICD-10-CM

## 2025-06-13 DIAGNOSIS — E11.22 TYPE 2 DIABETES MELLITUS WITH STAGE 2 CHRONIC KIDNEY DISEASE, WITHOUT LONG-TERM CURRENT USE OF INSULIN: ICD-10-CM

## 2025-06-13 DIAGNOSIS — E78.2 MULTIPLE-TYPE HYPERLIPIDEMIA: ICD-10-CM

## 2025-06-13 DIAGNOSIS — E87.5 HYPERKALEMIA: ICD-10-CM

## 2025-06-13 DIAGNOSIS — N18.2 TYPE 2 DIABETES MELLITUS WITH STAGE 2 CHRONIC KIDNEY DISEASE, WITHOUT LONG-TERM CURRENT USE OF INSULIN: ICD-10-CM

## 2025-06-13 DIAGNOSIS — E11.9 TYPE 2 DIABETES MELLITUS WITHOUT COMPLICATION, WITHOUT LONG-TERM CURRENT USE OF INSULIN: Chronic | ICD-10-CM

## 2025-06-13 LAB
ABSOLUTE EOSINOPHIL (SMH): 0 K/UL
ABSOLUTE MONOCYTE (SMH): 0.49 K/UL (ref 0.3–1)
ABSOLUTE NEUTROPHIL COUNT (SMH): 4.7 K/UL (ref 1.8–7.7)
ALBUMIN SERPL-MCNC: 4.4 G/DL (ref 3.5–5.2)
ALBUMIN/CREAT UR: NORMAL
ANION GAP (SMH): 8 MMOL/L (ref 8–16)
BACTERIA #/AREA URNS AUTO: NORMAL /HPF
BASOPHILS # BLD AUTO: 0.01 K/UL
BASOPHILS NFR BLD AUTO: 0.2 %
BILIRUB UR QL STRIP.AUTO: NEGATIVE
BUN SERPL-MCNC: 18 MG/DL (ref 8–23)
CALCIUM SERPL-MCNC: 9.9 MG/DL (ref 8.7–10.5)
CHLORIDE SERPL-SCNC: 93 MMOL/L (ref 95–110)
CHOLEST SERPL-MCNC: 125 MG/DL (ref 120–199)
CHOLEST/HDLC SERPL: 3 {RATIO} (ref 2–5)
CLARITY UR: CLEAR
CO2 SERPL-SCNC: 30 MMOL/L (ref 23–29)
COLOR UR AUTO: COLORLESS
CORTIS SERPL-MCNC: 17 UG/DL (ref 4.3–22.4)
CREAT SERPL-MCNC: 1.1 MG/DL (ref 0.5–1.4)
CREAT UR-MCNC: 34.1 MG/DL (ref 23–375)
CREAT UR-MCNC: 34.1 MG/DL (ref 23–375)
EAG (SMH): 166 MG/DL (ref 68–131)
ERYTHROCYTE [DISTWIDTH] IN BLOOD BY AUTOMATED COUNT: 13.2 % (ref 11.5–14.5)
GFR SERPLBLD CREATININE-BSD FMLA CKD-EPI: >60 ML/MIN/1.73/M2
GLUCOSE SERPL-MCNC: 150 MG/DL (ref 70–110)
GLUCOSE UR QL STRIP: ABNORMAL
HBA1C MFR BLD: 7.4 % (ref 4.5–6.2)
HCT VFR BLD AUTO: 46.3 % (ref 40–54)
HDLC SERPL-MCNC: 41 MG/DL (ref 40–75)
HDLC SERPL: 32.8 % (ref 20–50)
HGB BLD-MCNC: 15.5 GM/DL (ref 14–18)
HGB UR QL STRIP: NEGATIVE
IMM GRANULOCYTES # BLD AUTO: 0.04 K/UL (ref 0–0.04)
IMM GRANULOCYTES NFR BLD AUTO: 0.7 % (ref 0–0.5)
KETONES UR QL STRIP: NEGATIVE
LDLC SERPL CALC-MCNC: 61.8 MG/DL (ref 63–159)
LEUKOCYTE ESTERASE UR QL STRIP: NEGATIVE
LYMPHOCYTES # BLD AUTO: 0.84 K/UL (ref 1–4.8)
MCH RBC QN AUTO: 27 PG (ref 27–31)
MCHC RBC AUTO-ENTMCNC: 33.5 G/DL (ref 32–36)
MCV RBC AUTO: 81 FL (ref 82–98)
MICROALBUMIN UR-MCNC: <7 UG/ML
MICROSCOPIC COMMENT: NORMAL
NITRITE UR QL STRIP: NEGATIVE
NONHDLC SERPL-MCNC: 84 MG/DL
NUCLEATED RBC (/100WBC) (SMH): 0 /100 WBC
OSMOLALITY SERPL: 286 MOSM/KG (ref 280–300)
OSMOLALITY UR: 426 MOSM/KG (ref 50–1200)
PH UR STRIP: 8 [PH]
PHOSPHATE SERPL-MCNC: 3.4 MG/DL (ref 2.7–4.5)
PLATELET # BLD AUTO: 274 K/UL (ref 150–450)
PMV BLD AUTO: 10 FL (ref 9.2–12.9)
POTASSIUM SERPL-SCNC: 4.4 MMOL/L (ref 3.5–5.1)
POTASSIUM UR-SCNC: 42 MMOL/L (ref 15–95)
PROT UR QL STRIP: NEGATIVE
PROT UR-MCNC: 5 MG/DL
PROT/CREAT UR: 0.15 MG/G{CREAT}
PTH-INTACT SERPL-MCNC: 46.1 PG/ML (ref 9–77)
RBC # BLD AUTO: 5.75 M/UL (ref 4.6–6.2)
RBC #/AREA URNS AUTO: 1 /HPF
RELATIVE EOSINOPHIL (SMH): 0 % (ref 0–8)
RELATIVE LYMPHOCYTE (SMH): 13.9 % (ref 18–48)
RELATIVE MONOCYTE (SMH): 8.1 % (ref 4–15)
RELATIVE NEUTROPHIL (SMH): 77.1 % (ref 38–73)
SODIUM SERPL-SCNC: 131 MMOL/L (ref 136–145)
SODIUM UR-SCNC: 52 MMOL/L (ref 20–250)
SP GR UR STRIP: 1.01
TRIGL SERPL-MCNC: 111 MG/DL (ref 30–150)
UROBILINOGEN UR STRIP-ACNC: NEGATIVE EU/DL
WBC # BLD AUTO: 6.03 K/UL (ref 3.9–12.7)
WBC #/AREA URNS AUTO: 0 /HPF
YEAST UR QL AUTO: NORMAL

## 2025-06-13 PROCEDURE — 82043 UR ALBUMIN QUANTITATIVE: CPT

## 2025-06-13 PROCEDURE — 80061 LIPID PANEL: CPT

## 2025-06-13 PROCEDURE — 83970 ASSAY OF PARATHORMONE: CPT

## 2025-06-13 PROCEDURE — 83935 ASSAY OF URINE OSMOLALITY: CPT

## 2025-06-13 PROCEDURE — 83036 HEMOGLOBIN GLYCOSYLATED A1C: CPT

## 2025-06-13 PROCEDURE — 80069 RENAL FUNCTION PANEL: CPT

## 2025-06-13 PROCEDURE — 36415 COLL VENOUS BLD VENIPUNCTURE: CPT

## 2025-06-13 PROCEDURE — 84133 ASSAY OF URINE POTASSIUM: CPT

## 2025-06-13 PROCEDURE — 83930 ASSAY OF BLOOD OSMOLALITY: CPT

## 2025-06-13 PROCEDURE — 82533 TOTAL CORTISOL: CPT

## 2025-06-13 PROCEDURE — 81003 URINALYSIS AUTO W/O SCOPE: CPT

## 2025-06-13 PROCEDURE — 82570 ASSAY OF URINE CREATININE: CPT | Mod: 59

## 2025-06-13 PROCEDURE — 84300 ASSAY OF URINE SODIUM: CPT

## 2025-06-13 PROCEDURE — 85025 COMPLETE CBC W/AUTO DIFF WBC: CPT

## 2025-06-18 ENCOUNTER — OFFICE VISIT (OUTPATIENT)
Dept: NEPHROLOGY | Facility: CLINIC | Age: 70
End: 2025-06-18
Payer: MEDICARE

## 2025-06-18 DIAGNOSIS — E87.5 HYPERKALEMIA: ICD-10-CM

## 2025-06-18 DIAGNOSIS — E87.1 HYPONATREMIA: ICD-10-CM

## 2025-06-18 DIAGNOSIS — N25.89 PSEUDOHYPOALDOSTERONISM, TYPE 2: ICD-10-CM

## 2025-06-18 DIAGNOSIS — N18.2 TYPE 2 DIABETES MELLITUS WITH STAGE 2 CHRONIC KIDNEY DISEASE, WITHOUT LONG-TERM CURRENT USE OF INSULIN: Primary | ICD-10-CM

## 2025-06-18 DIAGNOSIS — I10 ESSENTIAL HYPERTENSION: ICD-10-CM

## 2025-06-18 DIAGNOSIS — E11.22 TYPE 2 DIABETES MELLITUS WITH STAGE 2 CHRONIC KIDNEY DISEASE, WITHOUT LONG-TERM CURRENT USE OF INSULIN: Primary | ICD-10-CM

## 2025-06-18 RX ORDER — DAPAGLIFLOZIN 10 MG/1
10 TABLET, FILM COATED ORAL EVERY MORNING
Qty: 30 TABLET | Refills: 11 | Status: SHIPPED | OUTPATIENT
Start: 2025-06-18 | End: 2026-06-13

## 2025-06-18 NOTE — PROGRESS NOTES
The patient location is:  Home  The chief complaint leading to consultation is: who returns for ongoing evaluation and management of CKD.       Visit type: audiovisual    Face to Face time with patient: 8min  20 minutes of total time spent on the encounter, which includes face to face time and non-face to face time preparing to see the patient (eg, review of tests), Obtaining and/or reviewing separately obtained history, Documenting clinical information in the electronic or other health record, Independently interpreting results (not separately reported) and communicating results to the patient/family/caregiver, or Care coordination (not separately reported).         Each patient to whom he or she provides medical services by telemedicine is:  (1) informed of the relationship between the physician and patient and the respective role of any other health care provider with respect to management of the patient; and (2) notified that he or she may decline to receive medical services by telemedicine and may withdraw from such care at any time.    Notes:       [facility]ph[facility]    Subjective:       HPI ID: Mg Torre is a 70 y.o. male who returns for ongoing evaluation and management of CKD.     Mg Torre is referred by Salazar Campbell MD to be evaluated for hyperkalemia.  He has ongoing conditions of hypertension, hyperlipidemia, diabetes mellitus type 2.  We reviewed his recent chemistry panels at chair side.  Last chemistry panel was from 12/26/2024 featuring a serum creatinine creatinine of 1.0 mg/dL which appears consistent with his trends over the last year, and potassium 5.2 mEq per L. his serum potassium over the last 3 years ranges from 4.3-6.2 mEq per L with most values in the 5s.    In terms of his renal history, he denies hospitalizations for prior MALINDA or MALINDA requiring RRT. Denies history of nephrolithiasis or hematuria episodes. No reported history of  frequent or recurrent use of NSAIDs/COXI. No pertinent rheumatologic or AI disease history. Denies any pertinent family history of known kidney disease, or family members diagnosed with ESRD requiring dialysis.  Smoking Hx: denies  Other pertinent urologic history: denies  Fluid intake per 24hr: 40 oz per day.     Interval history:  3/11/25 - here for f/u today. Started on 2.5mg indapamide (THZ) in the interim and now with resolution of hyperkalemia. He does have increased episodes of dizziness on standing since starting the medication.  We discussed alternatives but settled on reducing the dose of indapamide for now.  He is also well maintained on SGLT2 inhibitor.  We discussed adding more protein/solute in the diet to offset lower sodium trend on his chemistry.  (his hyponatremia is isotonic based on available labs)    6/18/25 - here for f/u via telemed. Feels well. Only has seldom dizziness episodes but much better since last visit. We reviewed recent labs. Denies other complaints at this time.       Review of Systems   Constitutional:  Negative for chills, diaphoresis and fever.   Respiratory:  Negative for cough and shortness of breath.    Cardiovascular:  Negative for chest pain and leg swelling.   Gastrointestinal:  Negative for abdominal pain, diarrhea, nausea and vomiting.   Genitourinary:  Negative for difficulty urinating, dysuria and hematuria.   Musculoskeletal:  Negative for myalgias.   Neurological:  Negative for headaches.   Hematological:  Does not bruise/bleed easily.       The past medical, family and social histories were reviewed for this encounter.     Past Medical History:   Diagnosis Date    Allergy     pcn    Diabetes mellitus     Diabetes mellitus, type 2     Hyperkalemia     Hyperlipidemia     Hypertension     Lab test positive for detection of COVID-19 virus 08/10/2021    Patient had tested himself at urgent care in [facility].  Minimally symptomatic at this point.   "Continue with precautions.  Patient's son is positive with significant problems.    Screening for diabetic retinopathy 08/31/2023    Negative       Current Outpatient Medications   Medication Sig    aspirin 81 MG Chew Take 1 tablet by mouth once daily.    atorvastatin (LIPITOR) 40 MG tablet Take 1 tablet (40 mg total) by mouth once daily.    cetirizine 10 mg Cap Take 1 tablet by mouth once daily.    dulaglutide (TRULICITY) 4.5 mg/0.5 mL pen injector INJECT 4.5mg INTO THE SKIN EVERY 7 DAYS    FARXIGA 10 mg tablet TAKE 1 TABLET BY MOUTH ONCE DAILY IN THE MORNING    indapamide (LOZOL) 1.25 MG Tab Take 1 tablet (1.25 mg total) by mouth once daily.    metFORMIN (GLUCOPHAGE) 500 MG tablet Take 2 tablets (1,000 mg total) by mouth 2 (two) times daily.    nystatin (MYCOSTATIN) ointment Apply topically 2 (two) times daily.    omeprazole (PRILOSEC) 20 MG capsule Take 20 mg by mouth once daily.    triamcinolone acetonide 0.1% (KENALOG) 0.1 % cream AAA bid (Patient not taking: Reported on 4/10/2025)    turmeric root extract 500 mg Cap Take 1 tablet by mouth 2 (two) times daily.    vit C/E/Zn/coppr/lutein/zeaxan (PRESERVISION AREDS-2 ORAL)      No current facility-administered medications for this visit.         Objective:   There were no vitals taken for this visit.     Physical Exam    Assessment:     Lab Results   Component Value Date    CREATININE 1.1 06/13/2025    BUN 18 06/13/2025     (L) 06/13/2025    K 4.4 06/13/2025    CL 93 (L) 06/13/2025    CO2 30 (H) 06/13/2025     Lab Results   Component Value Date    PTH 46.1 06/13/2025    CALCIUM 9.9 06/13/2025    CAION 5.2 04/12/2023    PHOS 3.4 06/13/2025     Lab Results   Component Value Date    HCT 46.3 06/13/2025     No results found for: "UTPCR"    Lab Results   Component Value Date    MICALBCREAT Unable to calculate 03/05/2025           No diagnosis found.        Plan:   Return to clinic in 3 months  Baseline creatinine is 0.9-1.1mg/dL.  Orders Placed This Encounter "   Procedures    Renal Function Panel    Sodium, Random Urine    Potassium, Random Urine    Osmolality, Urine    Osmolality, Serum       Hyperkalemia - improved  -renin/allison reviewed, acceptable.   -cortisol wnl.   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   -defer genetic testing for now  -extensively reviewed Low K diet with patient -> do no suspect dietary source  -improved condition with THZ diuretic class, though reducing dose as may be too strong for him  -increase free water intake to 80oz per day    Hypertension  -120s/70s at home but having symptoms of dizziness/lightheadness, now less frequently. Reduce dose of THZ diuretic today to every other day. Can stop if symptoms of dizziness/metabolic alkalosis persist persist    DM type II without long term use of insulin and CKD G2  -not on RASI d/t recurrent hyperkalemia issues  -albuminuria controlled.   -on SGLT2-I for CKD-MACE risk reduction, proteinuria reduction and eric-protection    Hyponatremia, isotonic  -serum osm = isotonic. No directed corrective treatment needed.   -likely related to THZ use, induced IVVD.  -again reduce dose of THZ diuretic to every other day  -increase protein in the diet, target >50g of protein per day.  -maintain good hydration      __________________________  David Aguayo MD  [facility]    Part of this note has been created using Simple-Fill dictation system. Errors in transcription may not be completely avoided.    Computed KFRE 2-Year unavailable. One or more values for this score either were not found within the given timeframe or did not fit some other criterion.    Computed KFRE 5-Year unavailable. One or more values for this score either were not found within the given timeframe or did not fit some other criterion.

## 2025-06-26 ENCOUNTER — TELEPHONE (OUTPATIENT)
Dept: PHARMACY | Facility: CLINIC | Age: 70
End: 2025-06-26
Payer: MEDICARE

## 2025-06-26 NOTE — TELEPHONE ENCOUNTER
[facility]/Population Health Chart Review & Patient Outreach Details For Medication Adherence Project    Reason for Outreach Encounter: 3rd Party payor non-compliance report ([employer], [employer], C, etc)  2.  Patient Outreach Method: Reviewed Patient Chart  3.   Medication in question: farxiga   LAST FILLED: 6/24/25 for 30 day supply  Diabetes Medications              dapagliflozin propanediol (FARXIGA) 10 mg tablet Take 1 tablet (10 mg total) by mouth every morning.    dulaglutide (TRULICITY) 4.5 mg/0.5 mL pen injector INJECT 4.5mg INTO THE SKIN EVERY 7 DAYS    metFORMIN (GLUCOPHAGE) 500 MG tablet Take 2 tablets (1,000 mg total) by mouth 2 (two) times daily.              4.  Reviewed and or Updates Made To: Patient Chart  5. Outreach Outcomes and/or actions taken: Patient filled medication and is on track to be adherent

## 2025-06-28 DIAGNOSIS — E11.9 TYPE 2 DIABETES MELLITUS WITHOUT COMPLICATION, WITHOUT LONG-TERM CURRENT USE OF INSULIN: ICD-10-CM

## 2025-06-28 NOTE — TELEPHONE ENCOUNTER
Care Due:                  Date            Visit Type   Department     Provider  --------------------------------------------------------------------------------                                EP - [facility]      [address]  Last Visit: 04-      CARE ([facility])   FAMILY Yvonne Aldricha EP - [facility] [address]  Next Visit: 08-      CARE ([facility])   FAMILY Russell  [facility]                                                            Last  Test          Frequency    Reason                     Performed    Due Date  --------------------------------------------------------------------------------    CMP.........  12 months..  atorvastatin.............  03- 03-    Health [facility] Embedded Care Due Messages. Reference number: [id number].   6/28/2025 9:25:17 AM CDT

## 2025-06-29 NOTE — TELEPHONE ENCOUNTER
Refill Routing Note   Medication(s) are not appropriate for processing by [facility] for the following reason(s):        Drug-disease interaction: metFORMIN and Type 2 diabetes mellitus with stage 2 chronic kidney disease, without long-term current use of insulin/Pseudohypoaldosteronism, type 2    [facility] action(s):  Defer     Requires labs : Yes             Appointments  past 12m or future 3m with PCP    Date Provider   Last Visit   4/10/2025 Salazar Campbell MD   Next Visit   8/14/2025 Salazar Campbell MD   ED visits in past 90 days: 0        Note composed:2:27 PM 06/29/2025

## 2025-06-30 RX ORDER — METFORMIN HYDROCHLORIDE 500 MG/1
1000 TABLET ORAL 2 TIMES DAILY
Qty: 360 TABLET | Refills: 1 | Status: SHIPPED | OUTPATIENT
Start: 2025-06-30

## 2025-07-15 ENCOUNTER — TELEPHONE (OUTPATIENT)
Dept: NEPHROLOGY | Facility: CLINIC | Age: 70
End: 2025-07-15
Payer: MEDICARE

## 2025-07-19 DIAGNOSIS — E11.9 TYPE 2 DIABETES MELLITUS WITHOUT COMPLICATION, WITHOUT LONG-TERM CURRENT USE OF INSULIN: Chronic | ICD-10-CM

## 2025-07-19 RX ORDER — DULAGLUTIDE 4.5 MG/.5ML
INJECTION, SOLUTION SUBCUTANEOUS
Qty: 12 PEN | Refills: 1 | Status: SHIPPED | OUTPATIENT
Start: 2025-07-19

## 2025-07-19 NOTE — TELEPHONE ENCOUNTER
No care due was identified.  Health [facility] Embedded Care Due Messages. Reference number: [id number].   7/19/2025 1:42:20 PM CDT

## 2025-07-19 NOTE — TELEPHONE ENCOUNTER
Refill Decision Note   Mg Torre  is requesting a refill authorization.  Brief Assessment and Rationale for Refill:  Approve     Medication Therapy Plan:        Comments:     Note composed:1:48 PM 07/19/2025

## 2025-08-14 ENCOUNTER — PATIENT MESSAGE (OUTPATIENT)
Dept: FAMILY MEDICINE | Facility: CLINIC | Age: 70
End: 2025-08-14

## 2025-08-14 ENCOUNTER — OFFICE VISIT (OUTPATIENT)
Dept: FAMILY MEDICINE | Facility: CLINIC | Age: 70
End: 2025-08-14
Payer: MEDICARE

## 2025-08-14 VITALS
BODY MASS INDEX: 27.74 KG/M2 | HEIGHT: 68 IN | DIASTOLIC BLOOD PRESSURE: 85 MMHG | WEIGHT: 183 LBS | HEART RATE: 89 BPM | SYSTOLIC BLOOD PRESSURE: 134 MMHG

## 2025-08-14 DIAGNOSIS — E11.9 TYPE 2 DIABETES MELLITUS WITHOUT COMPLICATION, WITHOUT LONG-TERM CURRENT USE OF INSULIN: Primary | ICD-10-CM

## 2025-08-14 DIAGNOSIS — E78.2 MULTIPLE-TYPE HYPERLIPIDEMIA: ICD-10-CM

## 2025-08-14 DIAGNOSIS — L40.9 PSORIASIS: ICD-10-CM

## 2025-08-14 DIAGNOSIS — E83.52 HYPERCALCEMIA: ICD-10-CM

## 2025-08-14 DIAGNOSIS — I10 BENIGN ESSENTIAL HYPERTENSION: ICD-10-CM

## 2025-08-14 DIAGNOSIS — E87.1 LOW SODIUM LEVELS: ICD-10-CM

## 2025-08-14 DIAGNOSIS — E87.5 HYPERKALEMIA: ICD-10-CM

## 2025-08-14 PROCEDURE — 99999 PR PBB SHADOW E&M-EST. PATIENT-LVL III: CPT | Mod: PBBFAC,,, | Performed by: INTERNAL MEDICINE

## 2025-08-14 PROCEDURE — 1159F MED LIST DOCD IN RCRD: CPT | Mod: CPTII,S$GLB,, | Performed by: INTERNAL MEDICINE

## 2025-08-14 PROCEDURE — G2211 COMPLEX E/M VISIT ADD ON: HCPCS | Mod: S$GLB,,, | Performed by: INTERNAL MEDICINE

## 2025-08-14 PROCEDURE — 1101F PT FALLS ASSESS-DOCD LE1/YR: CPT | Mod: CPTII,S$GLB,, | Performed by: INTERNAL MEDICINE

## 2025-08-14 PROCEDURE — 3288F FALL RISK ASSESSMENT DOCD: CPT | Mod: CPTII,S$GLB,, | Performed by: INTERNAL MEDICINE

## 2025-08-14 PROCEDURE — 3061F NEG MICROALBUMINURIA REV: CPT | Mod: CPTII,S$GLB,, | Performed by: INTERNAL MEDICINE

## 2025-08-14 PROCEDURE — 3079F DIAST BP 80-89 MM HG: CPT | Mod: CPTII,S$GLB,, | Performed by: INTERNAL MEDICINE

## 2025-08-14 PROCEDURE — 3008F BODY MASS INDEX DOCD: CPT | Mod: CPTII,S$GLB,, | Performed by: INTERNAL MEDICINE

## 2025-08-14 PROCEDURE — 1125F AMNT PAIN NOTED PAIN PRSNT: CPT | Mod: CPTII,S$GLB,, | Performed by: INTERNAL MEDICINE

## 2025-08-14 PROCEDURE — 1160F RVW MEDS BY RX/DR IN RCRD: CPT | Mod: CPTII,S$GLB,, | Performed by: INTERNAL MEDICINE

## 2025-08-14 PROCEDURE — 3051F HG A1C>EQUAL 7.0%<8.0%: CPT | Mod: CPTII,S$GLB,, | Performed by: INTERNAL MEDICINE

## 2025-08-14 PROCEDURE — 3066F NEPHROPATHY DOC TX: CPT | Mod: CPTII,S$GLB,, | Performed by: INTERNAL MEDICINE

## 2025-08-14 PROCEDURE — 3075F SYST BP GE 130 - 139MM HG: CPT | Mod: CPTII,S$GLB,, | Performed by: INTERNAL MEDICINE

## 2025-08-14 PROCEDURE — 99214 OFFICE O/P EST MOD 30 MIN: CPT | Mod: S$GLB,,, | Performed by: INTERNAL MEDICINE
